# Patient Record
Sex: MALE | Race: OTHER | Employment: FULL TIME | URBAN - METROPOLITAN AREA
[De-identification: names, ages, dates, MRNs, and addresses within clinical notes are randomized per-mention and may not be internally consistent; named-entity substitution may affect disease eponyms.]

---

## 2017-01-10 RX ORDER — LISINOPRIL 10 MG/1
TABLET ORAL
Qty: 90 TABLET | Refills: 0 | Status: SHIPPED | OUTPATIENT
Start: 2017-01-10 | End: 2017-04-07 | Stop reason: SDUPTHER

## 2017-02-06 RX ORDER — GLIMEPIRIDE 4 MG/1
TABLET ORAL
Qty: 60 TABLET | Refills: 2 | Status: SHIPPED | OUTPATIENT
Start: 2017-02-06 | End: 2017-05-04 | Stop reason: SDUPTHER

## 2017-04-07 RX ORDER — SITAGLIPTIN 100 MG/1
TABLET, FILM COATED ORAL
Qty: 90 TABLET | Refills: 0 | Status: SHIPPED | OUTPATIENT
Start: 2017-04-07 | End: 2017-07-10 | Stop reason: SDUPTHER

## 2017-04-07 RX ORDER — LISINOPRIL 10 MG/1
TABLET ORAL
Qty: 90 TABLET | Refills: 0 | Status: SHIPPED | OUTPATIENT
Start: 2017-04-07 | End: 2017-07-10 | Stop reason: SDUPTHER

## 2017-05-15 RX ORDER — BLOOD-GLUCOSE METER
KIT MISCELLANEOUS
Qty: 100 STRIP | Refills: 1 | Status: SHIPPED | OUTPATIENT
Start: 2017-05-15 | End: 2022-05-24 | Stop reason: ALTCHOICE

## 2017-07-10 RX ORDER — PRAVASTATIN SODIUM 10 MG
TABLET ORAL
Qty: 90 TABLET | Refills: 0 | Status: SHIPPED | OUTPATIENT
Start: 2017-07-10 | End: 2018-01-25 | Stop reason: SDUPTHER

## 2017-07-10 RX ORDER — LISINOPRIL 10 MG/1
TABLET ORAL
Qty: 90 TABLET | Refills: 0 | Status: SHIPPED | OUTPATIENT
Start: 2017-07-10 | End: 2018-01-25 | Stop reason: SDUPTHER

## 2017-12-07 RX ORDER — SILDENAFIL CITRATE 20 MG/1
20 TABLET ORAL PRN
Qty: 27 TABLET | Refills: 3 | Status: SHIPPED | OUTPATIENT
Start: 2017-12-07 | End: 2019-09-17

## 2018-01-25 ENCOUNTER — OFFICE VISIT (OUTPATIENT)
Dept: INTERNAL MEDICINE CLINIC | Age: 48
End: 2018-01-25
Payer: COMMERCIAL

## 2018-01-25 VITALS
HEART RATE: 79 BPM | WEIGHT: 249 LBS | DIASTOLIC BLOOD PRESSURE: 76 MMHG | HEIGHT: 74 IN | TEMPERATURE: 97.6 F | OXYGEN SATURATION: 97 % | BODY MASS INDEX: 31.95 KG/M2 | SYSTOLIC BLOOD PRESSURE: 120 MMHG

## 2018-01-25 DIAGNOSIS — L30.9 VESICULAR FOOT ECZEMA: ICD-10-CM

## 2018-01-25 DIAGNOSIS — E11.8 CONTROLLED TYPE 2 DIABETES MELLITUS WITH COMPLICATION, WITHOUT LONG-TERM CURRENT USE OF INSULIN (HCC): Primary | Chronic | ICD-10-CM

## 2018-01-25 LAB
GLUCOSE BLD-MCNC: 140 MG/DL
HBA1C MFR BLD: 5.3 %

## 2018-01-25 PROCEDURE — 83036 HEMOGLOBIN GLYCOSYLATED A1C: CPT | Performed by: INTERNAL MEDICINE

## 2018-01-25 PROCEDURE — 99213 OFFICE O/P EST LOW 20 MIN: CPT | Performed by: INTERNAL MEDICINE

## 2018-01-25 PROCEDURE — 82962 GLUCOSE BLOOD TEST: CPT | Performed by: INTERNAL MEDICINE

## 2018-01-25 RX ORDER — GLIMEPIRIDE 4 MG/1
4 TABLET ORAL
Qty: 90 TABLET | Refills: 1 | Status: SHIPPED | OUTPATIENT
Start: 2018-01-25 | End: 2018-07-09 | Stop reason: SDUPTHER

## 2018-01-25 RX ORDER — LISINOPRIL 10 MG/1
TABLET ORAL
Qty: 90 TABLET | Refills: 1 | Status: SHIPPED | OUTPATIENT
Start: 2018-01-25 | End: 2018-07-18 | Stop reason: SDUPTHER

## 2018-01-25 RX ORDER — PRAVASTATIN SODIUM 10 MG
TABLET ORAL
Qty: 90 TABLET | Refills: 1 | Status: SHIPPED | OUTPATIENT
Start: 2018-01-25 | End: 2018-07-18 | Stop reason: SDUPTHER

## 2018-01-25 RX ORDER — GLIMEPIRIDE 4 MG/1
TABLET ORAL
Qty: 180 TABLET | Refills: 1 | Status: CANCELLED | OUTPATIENT
Start: 2018-01-25

## 2018-01-25 ASSESSMENT — ENCOUNTER SYMPTOMS
EYES NEGATIVE: 1
SHORTNESS OF BREATH: 0
VISUAL CHANGE: 0
NAIL CHANGES: 0
DIARRHEA: 0
GASTROINTESTINAL NEGATIVE: 1
EYE PAIN: 0
CHEST TIGHTNESS: 0
BLOOD IN STOOL: 0
BLURRED VISION: 0
ABDOMINAL PAIN: 0
COUGH: 0

## 2018-01-25 ASSESSMENT — PATIENT HEALTH QUESTIONNAIRE - PHQ9
1. LITTLE INTEREST OR PLEASURE IN DOING THINGS: 0
2. FEELING DOWN, DEPRESSED OR HOPELESS: 0
SUM OF ALL RESPONSES TO PHQ QUESTIONS 1-9: 0
SUM OF ALL RESPONSES TO PHQ9 QUESTIONS 1 & 2: 0

## 2018-01-25 NOTE — PROGRESS NOTES
Lianne Golden is a 52 y.o. male with history of obesity, dyslipidemia, who presents with     Chief Complaint   Patient presents with    Diabetes     fasting home , A1C 5.3, the patient has lost weight    Rash     of the right foot     Medication Refill   The patient has not been seen in the office for over one year. Fortunately, he has lost significant amount of weight and improved his diabetes control. He continued all his medications and noticed blood sugar being 90 or below some of the times. States he is on the diet which includes mostly vegetables and meats. He is trying to eat healthy. He does not exercise much and continues to work in the moderate to high stress level job. The following laboratory reports since the past visit were reviewed (the ones pertinent to today's visit were discussed with the patient):    Office Visit on 01/25/2018   Component Date Value Ref Range Status    Hemoglobin A1C 01/25/2018 5.3  % Final    Glucose 01/25/2018 140  mg/dL Final       HPI:    Diabetes   He presents for his follow-up diabetic visit. He has type 2 diabetes mellitus. His disease course has been improving. Pertinent negatives for hypoglycemia include no confusion, dizziness, headaches, nervousness/anxiousness or tremors. Pertinent negatives for diabetes include no blurred vision, no chest pain, no foot paresthesias, no foot ulcerations, no polydipsia, no polyuria, no visual change and no weight loss. There are no hypoglycemic complications. Diabetic complications include nephropathy and retinopathy. Pertinent negatives for diabetic complications include no CVA, heart disease, peripheral neuropathy or PVD. Risk factors for coronary artery disease include male sex, obesity, stress and diabetes mellitus. Current diabetic treatment includes oral agent (triple therapy). He is compliant with treatment all of the time. His weight is decreasing steadily. He is following a generally healthy diet.  Meal Negative for cold intolerance, heat intolerance, polydipsia and polyuria. Genitourinary: Negative for dysuria, frequency and hematuria. Musculoskeletal: Negative. Negative for joint pain and myalgias. Skin: Positive for rash. Negative for nail changes. Neurological: Negative. Negative for dizziness, tremors, light-headedness, numbness and headaches. Psychiatric/Behavioral: Negative for confusion, decreased concentration, dysphoric mood and sleep disturbance. The patient is not nervous/anxious. Vitals:    01/25/18 1218   BP: 120/76   Site: Right Arm   Position: Sitting   Cuff Size: Medium Adult   Pulse: 79   Temp: 97.6 °F (36.4 °C)   TempSrc: Tympanic   SpO2: 97%   Weight: 249 lb (112.9 kg)   Height: 6' 2\" (1.88 m)       Physical Exam   Constitutional: He is oriented to person, place, and time. He appears well-nourished. No distress. HENT:   Head: Atraumatic. Mouth/Throat: Oropharynx is clear and moist.   Eyes: Conjunctivae are normal. No scleral icterus. Neck: Neck supple. No thyromegaly present. Cardiovascular: Regular rhythm, normal heart sounds and intact distal pulses. No murmur heard. Pulmonary/Chest: Effort normal and breath sounds normal.   Abdominal: Soft. He exhibits no distension. There is no tenderness. Musculoskeletal: Normal range of motion. Foot exam reveals incipient right great toe bunion, good dorsalis pedis and tibialis posterior pulses, no onychomycosis or interdigital dermatitis. The light touch sensation, filament test and the position sense are preserved in both feet  There is a 3 x 2 cm irregularly shaped erythematous patch with a rough surface, coarse scaling, on the dorsum of the right foot, by the MTP joint of the toes 3 and 4, appearance of eczema     Lymphadenopathy:     He has no cervical adenopathy. Neurological: He is alert and oriented to person, place, and time. No cranial nerve deficit. Coordination normal.   Skin: Skin is warm and dry. Placed This Encounter   Procedures    CBC     Standing Status:   Future     Standing Expiration Date:   1/25/2019    Comprehensive Metabolic Panel     Standing Status:   Future     Standing Expiration Date:   1/25/2019    Lipid Panel     Standing Status:   Future     Standing Expiration Date:   1/25/2019     Order Specific Question:   Is Patient Fasting?/# of Hours     Answer:   Yes, 12 Hours    Urinalysis     Standing Status:   Future     Standing Expiration Date:   1/25/2019     Order Specific Question:   SPECIFY(EX-CATH,MIDSTREAM,CYSTO,ETC)? Answer:   Midstream    Vitamin B12 & Folate     Standing Status:   Future     Standing Expiration Date:   1/25/2019    Microalbumin / Creatinine Urine Ratio     Standing Status:   Future     Standing Expiration Date:   1/25/2019    Referral To Podiatry - (DINORAH) Sohail Donato DPM     Referral Priority:   Routine     Referral Type:   Consult for Advice and Opinion     Referral Reason:   Specialty Services Required     Referred to Provider:   Sohail Donato DPM     Requested Specialty:   Podiatry     Number of Visits Requested:   1    POCT glycosylated hemoglobin (Hb A1C)    POCT Glucose    HM DIABETES FOOT EXAM     Further workup and plan will be determined based on clinical progression and follow up test/ treatment results. Close follow up needed to evaluate treatment results and for care coordination. Return in about 4 months (around 5/25/2018). I have reviewed the patient's medical and surgical, family and social history, health maintenance schedule, and updated the computerized patient record. Please note this report has been partially produced by using speech recognition hardware. It may contain errors related to the system, including grammar, punctuation and spelling as well as words and phrases that may seem inaccurate. For any questions or concerns, please feel free to contact me for clarification.         Electronically signed by Ksenia Parsons Hector MORGAN

## 2018-02-01 NOTE — TELEPHONE ENCOUNTER
Pharmacy is requesting an increase in the number of tablets. New Rx is pended for your review.  Please advise

## 2018-02-02 RX ORDER — SILDENAFIL CITRATE 20 MG/1
20 TABLET ORAL PRN
Qty: 90 TABLET | Refills: 3 | Status: SHIPPED | OUTPATIENT
Start: 2018-02-02 | End: 2019-09-17

## 2018-07-09 RX ORDER — GLIMEPIRIDE 4 MG/1
TABLET ORAL
Qty: 90 TABLET | Refills: 0 | Status: SHIPPED | OUTPATIENT
Start: 2018-07-09 | End: 2018-08-25 | Stop reason: SDUPTHER

## 2018-07-18 ENCOUNTER — OFFICE VISIT (OUTPATIENT)
Dept: INTERNAL MEDICINE CLINIC | Age: 48
End: 2018-07-18
Payer: COMMERCIAL

## 2018-07-18 VITALS
HEART RATE: 74 BPM | HEIGHT: 74 IN | SYSTOLIC BLOOD PRESSURE: 128 MMHG | OXYGEN SATURATION: 99 % | DIASTOLIC BLOOD PRESSURE: 80 MMHG | BODY MASS INDEX: 32.98 KG/M2 | TEMPERATURE: 97.4 F | WEIGHT: 257 LBS

## 2018-07-18 DIAGNOSIS — I10 ESSENTIAL HYPERTENSION: Chronic | ICD-10-CM

## 2018-07-18 DIAGNOSIS — E11.8 CONTROLLED TYPE 2 DIABETES MELLITUS WITH COMPLICATION, WITHOUT LONG-TERM CURRENT USE OF INSULIN (HCC): Chronic | ICD-10-CM

## 2018-07-18 DIAGNOSIS — E11.8 TYPE 2 DIABETES MELLITUS WITH COMPLICATION, WITHOUT LONG-TERM CURRENT USE OF INSULIN (HCC): Primary | Chronic | ICD-10-CM

## 2018-07-18 LAB
ALBUMIN SERPL-MCNC: 4.7 G/DL (ref 3.9–4.9)
ALP BLD-CCNC: 86 U/L (ref 35–104)
ALT SERPL-CCNC: 41 U/L (ref 0–41)
ANION GAP SERPL CALCULATED.3IONS-SCNC: 11 MEQ/L (ref 7–13)
AST SERPL-CCNC: 23 U/L (ref 0–40)
BILIRUB SERPL-MCNC: 0.5 MG/DL (ref 0–1.2)
BILIRUBIN URINE: NEGATIVE
BLOOD, URINE: NEGATIVE
BUN BLDV-MCNC: 13 MG/DL (ref 6–20)
CALCIUM SERPL-MCNC: 9.1 MG/DL (ref 8.6–10.2)
CHLORIDE BLD-SCNC: 102 MEQ/L (ref 98–107)
CHOLESTEROL, TOTAL: 89 MG/DL (ref 0–199)
CLARITY: CLEAR
CO2: 24 MEQ/L (ref 22–29)
COLOR: YELLOW
CREAT SERPL-MCNC: 0.61 MG/DL (ref 0.7–1.2)
CREATININE URINE: 232.8 MG/DL
FOLATE: 7.1 NG/ML (ref 7.3–26.1)
GFR AFRICAN AMERICAN: >60
GFR NON-AFRICAN AMERICAN: >60
GLOBULIN: 2.9 G/DL (ref 2.3–3.5)
GLUCOSE BLD-MCNC: 145 MG/DL (ref 74–109)
GLUCOSE BLD-MCNC: 167 MG/DL
GLUCOSE URINE: 250 MG/DL
HBA1C MFR BLD: 6.4 %
HCT VFR BLD CALC: 48.3 % (ref 42–52)
HDLC SERPL-MCNC: 33 MG/DL (ref 40–59)
HEMOGLOBIN: 16.7 G/DL (ref 14–18)
KETONES, URINE: NEGATIVE MG/DL
LDL CHOLESTEROL CALCULATED: 43 MG/DL (ref 0–129)
LEUKOCYTE ESTERASE, URINE: NEGATIVE
MCH RBC QN AUTO: 30.2 PG (ref 27–31.3)
MCHC RBC AUTO-ENTMCNC: 34.5 % (ref 33–37)
MCV RBC AUTO: 87.6 FL (ref 80–100)
MICROALBUMIN UR-MCNC: 4.8 MG/DL
MICROALBUMIN/CREAT UR-RTO: 20.6 MG/G (ref 0–30)
NITRITE, URINE: NEGATIVE
PDW BLD-RTO: 14 % (ref 11.5–14.5)
PH UA: 5 (ref 5–9)
PLATELET # BLD: 178 K/UL (ref 130–400)
POTASSIUM SERPL-SCNC: 3.9 MEQ/L (ref 3.5–5.1)
PROTEIN UA: NEGATIVE MG/DL
RBC # BLD: 5.52 M/UL (ref 4.7–6.1)
SODIUM BLD-SCNC: 137 MEQ/L (ref 132–144)
SPECIFIC GRAVITY UA: 1.03 (ref 1–1.03)
TOTAL PROTEIN: 7.6 G/DL (ref 6.4–8.1)
TRIGL SERPL-MCNC: 65 MG/DL (ref 0–200)
UROBILINOGEN, URINE: 0.2 E.U./DL
VITAMIN B-12: 553 PG/ML (ref 232–1245)
WBC # BLD: 7.8 K/UL (ref 4.8–10.8)

## 2018-07-18 PROCEDURE — 99213 OFFICE O/P EST LOW 20 MIN: CPT | Performed by: INTERNAL MEDICINE

## 2018-07-18 PROCEDURE — 82962 GLUCOSE BLOOD TEST: CPT | Performed by: INTERNAL MEDICINE

## 2018-07-18 PROCEDURE — 83036 HEMOGLOBIN GLYCOSYLATED A1C: CPT | Performed by: INTERNAL MEDICINE

## 2018-07-18 RX ORDER — LISINOPRIL 10 MG/1
TABLET ORAL
Qty: 45 TABLET | Refills: 1 | Status: SHIPPED | OUTPATIENT
Start: 2018-07-18 | End: 2018-10-17 | Stop reason: SDUPTHER

## 2018-07-18 RX ORDER — PRAVASTATIN SODIUM 10 MG
10 TABLET ORAL EVERY OTHER DAY
Qty: 45 TABLET | Refills: 1
Start: 2018-07-18 | End: 2019-04-03 | Stop reason: SDUPTHER

## 2018-07-18 RX ORDER — FOLIC ACID 1 MG/1
1 TABLET ORAL DAILY
Qty: 30 TABLET | Refills: 3 | Status: SHIPPED | OUTPATIENT
Start: 2018-07-18 | End: 2018-11-23 | Stop reason: SDUPTHER

## 2018-07-18 RX ORDER — LISINOPRIL 10 MG/1
TABLET ORAL
Qty: 90 TABLET | Refills: 1 | Status: SHIPPED | OUTPATIENT
Start: 2018-07-18 | End: 2018-07-18 | Stop reason: SDUPTHER

## 2018-07-18 ASSESSMENT — ENCOUNTER SYMPTOMS
BLURRED VISION: 0
DIARRHEA: 0
COUGH: 0
CHEST TIGHTNESS: 0
BLOOD IN STOOL: 0
VISUAL CHANGE: 0
SHORTNESS OF BREATH: 0
EYE PAIN: 0
ABDOMINAL PAIN: 0

## 2018-07-18 NOTE — PROGRESS NOTES
Problem Relation Age of Onset    Diabetes Father     Hypertension Mother        Family and social history were reviewed and pertinent changes documented. ALLERGIES    Patient has no known allergies. Current Outpatient Prescriptions on File Prior to Visit   Medication Sig Dispense Refill    metFORMIN (GLUCOPHAGE) 500 MG tablet TAKE TWO TABLETS BY MOUTH TWICE A DAY WITH MEALS 120 tablet 0    glimepiride (AMARYL) 4 MG tablet TAKE ONE TABLET BY MOUTH IN THE MORNING BEFORE BREAKFAST 90 tablet 0    sildenafil (REVATIO) 20 MG tablet Take 1 tablet by mouth as needed (PRN) (up to 5 tablets max. of 5 in 1 day) take on an empty stomach 2 hrs before planned sexual activity. 90 tablet 3    diclofenac sodium 1 % GEL Apply 2 g topically 2 times daily 1 Tube 3    sildenafil (REVATIO) 20 MG tablet Take 1 tablet by mouth as needed (PRN) (up to 5 tablets max. of 5 in 1 day) take on an empty stomach 2 hrs before planned sexual activity. 27 tablet 3    FREESTYLE LITE strip TEST TWICE A DAY AS DIRECTED 100 strip 1    LANCETS ULTRA THIN MISC 1 each by Does not apply route 2 times daily Dx: 250.00, non-insulin dependent 100 each 3    Blood Glucose Monitoring Suppl (FREESTYLE LITE) ABIGAIL 1 Device by Does not apply route daily Dx: 250.00, non-insulin dependent 1 Device prn    mometasone (ELOCON) 0.1 % cream Apply topically twice daily to the skin lesion 15 g 1    betamethasone dipropionate (DIPROLENE) 0.05 % ointment Apply topically 2 times daily. 30 g 0    vitamin D (CHOLECALCIFEROL) 400 UNITS TABS tablet Take 400 Units by mouth daily.  Multiple Vitamin (MULTI-VITAMIN) TABS Take 1 tablet by mouth daily. No current facility-administered medications on file prior to visit. Review of Systems   Constitutional: Negative for activity change, appetite change, fatigue, unexpected weight change and weight loss. HENT: Negative for nosebleeds.     Eyes: Negative for blurred vision, pain and visual disturbance. Respiratory: Negative for cough, chest tightness and shortness of breath. Cardiovascular: Negative for chest pain, palpitations and leg swelling. Gastrointestinal: Negative for abdominal pain, blood in stool and diarrhea. Endocrine: Negative for cold intolerance, heat intolerance, polydipsia and polyuria. Genitourinary: Negative for dysuria, frequency and hematuria. Musculoskeletal: Negative for myalgias. Neurological: Negative for dizziness, tremors, light-headedness, numbness and headaches. Psychiatric/Behavioral: Negative for confusion, decreased concentration, dysphoric mood and sleep disturbance. The patient is not nervous/anxious. Vitals:    07/18/18 1108 07/18/18 1113 07/18/18 1144   BP: (!) 148/90 (!) 150/90 128/80   Site: Left Arm Left Arm    Position: Sitting Sitting    Cuff Size: Large Adult Large Adult    Pulse: 74     Temp: 97.4 °F (36.3 °C)     TempSrc: Tympanic     SpO2: 99%     Weight: 257 lb (116.6 kg)     Height: 6' 2\" (1.88 m)         Physical Exam   Constitutional: He is oriented to person, place, and time. He appears well-nourished. No distress. HENT:   Head: Atraumatic. Mouth/Throat: Oropharynx is clear and moist.   Eyes: Conjunctivae are normal. No scleral icterus. Neck: Neck supple. No JVD present. No thyromegaly present. Cardiovascular: Regular rhythm, normal heart sounds and intact distal pulses. No murmur heard. Pulmonary/Chest: Effort normal and breath sounds normal.   Abdominal: Soft. He exhibits no distension. There is no tenderness. Musculoskeletal: Normal range of motion. Lymphadenopathy:     He has no cervical adenopathy. Neurological: He is alert and oriented to person, place, and time. No cranial nerve deficit. Coordination normal.   Skin: Skin is warm and dry. Psychiatric: His behavior is normal. Judgment normal. His mood appears anxious. His speech is rapid and/or pressured. He is not slowed and not withdrawn.  Cognition and

## 2018-07-18 NOTE — PATIENT INSTRUCTIONS
please click on the \"Sign Up Now\" link. Current as of: May 12, 2017  Content Version: 11.6  © 2246-5555 [employer]. Care instructions adapted under license by [facility] ([facility]). If you have questions about a medical condition or this instruction, always ask your healthcare professional. [address] any warranty or liability for your use of this information.

## 2018-10-17 RX ORDER — LISINOPRIL 10 MG/1
TABLET ORAL
Qty: 45 TABLET | Refills: 1 | Status: SHIPPED | OUTPATIENT
Start: 2018-10-17 | End: 2019-02-18 | Stop reason: SDUPTHER

## 2019-02-19 RX ORDER — LISINOPRIL 10 MG/1
TABLET ORAL
Qty: 45 TABLET | Refills: 1 | Status: SHIPPED | OUTPATIENT
Start: 2019-02-19 | End: 2019-06-07

## 2019-03-01 ENCOUNTER — OFFICE VISIT (OUTPATIENT)
Dept: UROLOGY | Age: 49
End: 2019-03-01
Payer: COMMERCIAL

## 2019-03-01 VITALS
DIASTOLIC BLOOD PRESSURE: 74 MMHG | SYSTOLIC BLOOD PRESSURE: 118 MMHG | HEIGHT: 74 IN | BODY MASS INDEX: 31.95 KG/M2 | WEIGHT: 249 LBS | HEART RATE: 66 BPM

## 2019-03-01 DIAGNOSIS — N40.0 BENIGN PROSTATIC HYPERPLASIA WITHOUT LOWER URINARY TRACT SYMPTOMS: Primary | ICD-10-CM

## 2019-03-01 PROCEDURE — 99213 OFFICE O/P EST LOW 20 MIN: CPT | Performed by: UROLOGY

## 2019-03-01 RX ORDER — SILDENAFIL 100 MG/1
100 TABLET, FILM COATED ORAL DAILY PRN
Qty: 30 TABLET | Refills: 11 | Status: SHIPPED | OUTPATIENT
Start: 2019-03-01 | End: 2019-09-17

## 2019-04-03 RX ORDER — GLIMEPIRIDE 4 MG/1
TABLET ORAL
Qty: 90 TABLET | Refills: 0 | Status: SHIPPED | OUTPATIENT
Start: 2019-04-03 | End: 2019-07-11 | Stop reason: SDUPTHER

## 2019-04-03 RX ORDER — PRAVASTATIN SODIUM 10 MG
TABLET ORAL
Qty: 30 TABLET | Refills: 3 | Status: SHIPPED | OUTPATIENT
Start: 2019-04-03 | End: 2019-09-19 | Stop reason: SDUPTHER

## 2019-07-12 RX ORDER — GLIMEPIRIDE 4 MG/1
TABLET ORAL
Qty: 90 TABLET | Refills: 0 | Status: SHIPPED | OUTPATIENT
Start: 2019-07-12 | End: 2019-09-17 | Stop reason: SDUPTHER

## 2019-09-09 ENCOUNTER — OFFICE VISIT (OUTPATIENT)
Dept: UROLOGY | Age: 49
End: 2019-09-09
Payer: COMMERCIAL

## 2019-09-09 VITALS
SYSTOLIC BLOOD PRESSURE: 136 MMHG | DIASTOLIC BLOOD PRESSURE: 84 MMHG | HEIGHT: 74 IN | BODY MASS INDEX: 34.01 KG/M2 | WEIGHT: 265 LBS | HEART RATE: 84 BPM

## 2019-09-09 DIAGNOSIS — N49.2 SCROTAL ABSCESS: Primary | ICD-10-CM

## 2019-09-09 PROCEDURE — 99214 OFFICE O/P EST MOD 30 MIN: CPT | Performed by: UROLOGY

## 2019-09-09 RX ORDER — CEPHALEXIN 500 MG/1
500 CAPSULE ORAL EVERY 12 HOURS
Qty: 20 CAPSULE | Refills: 0 | Status: SHIPPED | OUTPATIENT
Start: 2019-09-09 | End: 2020-03-13 | Stop reason: ALTCHOICE

## 2019-09-09 RX ORDER — TADALAFIL 20 MG/1
20 TABLET ORAL DAILY PRN
Qty: 30 TABLET | Refills: 11 | Status: SHIPPED | OUTPATIENT
Start: 2019-09-09 | End: 2020-10-12

## 2019-09-12 ENCOUNTER — OFFICE VISIT (OUTPATIENT)
Dept: UROLOGY | Age: 49
End: 2019-09-12
Payer: COMMERCIAL

## 2019-09-12 VITALS
HEIGHT: 74 IN | SYSTOLIC BLOOD PRESSURE: 126 MMHG | DIASTOLIC BLOOD PRESSURE: 70 MMHG | HEART RATE: 83 BPM | WEIGHT: 265 LBS | BODY MASS INDEX: 34.01 KG/M2

## 2019-09-12 DIAGNOSIS — N49.2 SCROTAL ABSCESS: Primary | ICD-10-CM

## 2019-09-12 PROCEDURE — 99212 OFFICE O/P EST SF 10 MIN: CPT | Performed by: UROLOGY

## 2019-09-17 ENCOUNTER — OFFICE VISIT (OUTPATIENT)
Dept: INTERNAL MEDICINE CLINIC | Age: 49
End: 2019-09-17
Payer: COMMERCIAL

## 2019-09-17 VITALS
SYSTOLIC BLOOD PRESSURE: 121 MMHG | BODY MASS INDEX: 34.28 KG/M2 | WEIGHT: 267 LBS | DIASTOLIC BLOOD PRESSURE: 73 MMHG | HEART RATE: 75 BPM | OXYGEN SATURATION: 97 %

## 2019-09-17 DIAGNOSIS — R20.2 PARESTHESIA OF LOWER EXTREMITY: ICD-10-CM

## 2019-09-17 LAB
ALBUMIN SERPL-MCNC: 4.3 G/DL (ref 3.5–4.6)
ALP BLD-CCNC: 91 U/L (ref 35–104)
ALT SERPL-CCNC: 42 U/L (ref 0–41)
ANION GAP SERPL CALCULATED.3IONS-SCNC: 9 MEQ/L (ref 9–15)
AST SERPL-CCNC: 27 U/L (ref 0–40)
BILIRUB SERPL-MCNC: 0.3 MG/DL (ref 0.2–0.7)
BILIRUBIN URINE: NEGATIVE
BLOOD, URINE: NEGATIVE
BUN BLDV-MCNC: 16 MG/DL (ref 6–20)
CALCIUM SERPL-MCNC: 9.1 MG/DL (ref 8.5–9.9)
CHLORIDE BLD-SCNC: 101 MEQ/L (ref 95–107)
CLARITY: CLEAR
CO2: 27 MEQ/L (ref 20–31)
COLOR: YELLOW
CREAT SERPL-MCNC: 0.67 MG/DL (ref 0.7–1.2)
CREATININE URINE: 113.5 MG/DL
FOLATE: 18.3 NG/ML (ref 7.3–26.1)
GFR AFRICAN AMERICAN: >60
GFR NON-AFRICAN AMERICAN: >60
GLOBULIN: 2.8 G/DL (ref 2.3–3.5)
GLUCOSE BLD-MCNC: 227 MG/DL (ref 70–99)
GLUCOSE URINE: 500 MG/DL
HBA1C MFR BLD: 8 %
HCT VFR BLD CALC: 42.3 % (ref 42–52)
HEMOGLOBIN: 14.8 G/DL (ref 14–18)
KETONES, URINE: NEGATIVE MG/DL
LEUKOCYTE ESTERASE, URINE: NEGATIVE
MCH RBC QN AUTO: 30.5 PG (ref 27–31.3)
MCHC RBC AUTO-ENTMCNC: 35.1 % (ref 33–37)
MCV RBC AUTO: 87.1 FL (ref 80–100)
MICROALBUMIN UR-MCNC: 1.5 MG/DL
MICROALBUMIN/CREAT UR-RTO: 13.2 MG/G (ref 0–30)
NITRITE, URINE: NEGATIVE
PDW BLD-RTO: 13.5 % (ref 11.5–14.5)
PH UA: 5 (ref 5–9)
PLATELET # BLD: 168 K/UL (ref 130–400)
POTASSIUM SERPL-SCNC: 4.9 MEQ/L (ref 3.4–4.9)
PROTEIN UA: NEGATIVE MG/DL
RBC # BLD: 4.86 M/UL (ref 4.7–6.1)
SODIUM BLD-SCNC: 137 MEQ/L (ref 135–144)
SPECIFIC GRAVITY UA: 1.02 (ref 1–1.03)
TOTAL PROTEIN: 7.1 G/DL (ref 6.3–8)
TSH SERPL DL<=0.05 MIU/L-ACNC: 1.15 UIU/ML (ref 0.44–3.86)
UROBILINOGEN, URINE: 0.2 E.U./DL
VITAMIN B-12: 455 PG/ML (ref 232–1245)
WBC # BLD: 6.7 K/UL (ref 4.8–10.8)

## 2019-09-17 PROCEDURE — 83036 HEMOGLOBIN GLYCOSYLATED A1C: CPT | Performed by: INTERNAL MEDICINE

## 2019-09-17 PROCEDURE — 99214 OFFICE O/P EST MOD 30 MIN: CPT | Performed by: INTERNAL MEDICINE

## 2019-09-17 RX ORDER — GLIMEPIRIDE 4 MG/1
6 TABLET ORAL
Qty: 135 TABLET | Refills: 0 | Status: SHIPPED | OUTPATIENT
Start: 2019-09-17 | End: 2020-04-08

## 2019-09-17 RX ORDER — FOLIC ACID 1 MG/1
1 TABLET ORAL DAILY
Qty: 90 TABLET | Refills: 0 | Status: SHIPPED | OUTPATIENT
Start: 2019-09-17 | End: 2020-01-20

## 2019-09-17 ASSESSMENT — ENCOUNTER SYMPTOMS
TROUBLE SWALLOWING: 0
SHORTNESS OF BREATH: 0
BLOOD IN STOOL: 0
ABDOMINAL PAIN: 0
EYE PAIN: 0
COUGH: 0
VISUAL CHANGE: 0
DIARRHEA: 0
CHEST TIGHTNESS: 0
BACK PAIN: 0

## 2019-09-17 NOTE — PROGRESS NOTES
pertinent changes documented. ALLERGIES    Patient has no known allergies. Current Outpatient Medications on File Prior to Visit   Medication Sig Dispense Refill    cephALEXin (KEFLEX) 500 MG capsule Take 1 capsule by mouth every 12 hours 20 capsule 0    tadalafil (CIALIS) 20 MG tablet Take 1 tablet by mouth daily as needed for Erectile Dysfunction 30 tablet 11    lisinopril (PRINIVIL;ZESTRIL) 10 MG tablet TAKE ONE-HALF TABLET BY MOUTH EVERY DAY 45 tablet 5    metFORMIN (GLUCOPHAGE) 500 MG tablet TAKE TWO TABLETS BY MOUTH TWICE A DAY WITH MEALS 120 tablet 5    pravastatin (PRAVACHOL) 10 MG tablet TAKE ONE TABLET BY MOUTH EVERY DAY IN THE EVENING 30 tablet 3    FREESTYLE LITE strip TEST TWICE A DAY AS DIRECTED 100 strip 1    LANCETS ULTRA THIN MISC 1 each by Does not apply route 2 times daily Dx: 250.00, non-insulin dependent 100 each 3    Blood Glucose Monitoring Suppl (FREESTYLE LITE) ABIGAIL 1 Device by Does not apply route daily Dx: 250.00, non-insulin dependent 1 Device prn    Multiple Vitamin (MULTI-VITAMIN) TABS Take 1 tablet by mouth daily. No current facility-administered medications on file prior to visit. Review of Systems   Constitutional: Negative for activity change, appetite change, fatigue, unexpected weight change and weight loss. HENT: Negative for nosebleeds and trouble swallowing. Eyes: Negative for pain and visual disturbance. Respiratory: Negative for cough, chest tightness and shortness of breath. Cardiovascular: Negative for chest pain, palpitations and leg swelling. Gastrointestinal: Negative for abdominal pain, blood in stool and diarrhea. Endocrine: Negative for cold intolerance, heat intolerance, polydipsia and polyuria. Genitourinary: Positive for impotence. Negative for dysuria and hematuria. Musculoskeletal: Negative for arthralgias, back pain, gait problem and myalgias. Skin: Positive for rash (on feet, comes and goes). Negative for wound. record. Please note this report has been partially produced by using speech recognition hardware. It may contain errors related to the system, including grammar, punctuation and spelling as well as words and phrases that may seem inaccurate. For anyquestions or concerns, please feel free to contact me for clarification.         Electronically signed by David Daniel MD

## 2019-09-19 NOTE — TELEPHONE ENCOUNTER
Pharmacy is requesting medication refill.  Please approve or deny this request.    Rx requested:  Requested Prescriptions     Pending Prescriptions Disp Refills    pravastatin (PRAVACHOL) 10 MG tablet [Pharmacy Med Name: PRAVASTATIN SODIUM 10MG TABS] 30 tablet 3     Sig: TAKE ONE TABLET BY MOUTH EVERY DAY IN THE EVENING         Last Office Visit:   9/17/2019      Next Visit Date:  Future Appointments   Date Time Provider Julissa Yarbrough   3/5/2020  9:00 AM Jacoby Hernández MD [facility]

## 2019-09-20 RX ORDER — PRAVASTATIN SODIUM 10 MG
TABLET ORAL
Qty: 30 TABLET | Refills: 3 | Status: SHIPPED | OUTPATIENT
Start: 2019-09-20 | End: 2020-02-25

## 2020-02-25 RX ORDER — PRAVASTATIN SODIUM 10 MG
TABLET ORAL
Qty: 30 TABLET | Refills: 3 | Status: SHIPPED | OUTPATIENT
Start: 2020-02-25 | End: 2020-07-02

## 2020-02-25 NOTE — TELEPHONE ENCOUNTER
Pharmacy requesting medication refill.  Please approve or deny this request.    Rx requested:  Requested Prescriptions     Pending Prescriptions Disp Refills    pravastatin (PRAVACHOL) 10 MG tablet [Pharmacy Med Name: PRAVASTATIN SODIUM 10MG TABS] 30 tablet 3     Sig: TAKE ONE TABLET BY MOUTH EVERY DAY IN THE EVENING         Last Office Visit:   9/17/2019      Next Visit Date:  Future Appointments   Date Time Provider Julissa Yarbrough   3/5/2020  9:00 AM Brittaney Vale MD [facility]

## 2020-03-12 DIAGNOSIS — N40.0 BENIGN PROSTATIC HYPERPLASIA WITHOUT LOWER URINARY TRACT SYMPTOMS: ICD-10-CM

## 2020-03-12 LAB — PROSTATE SPECIFIC ANTIGEN: 0.53 NG/ML (ref 0–3.89)

## 2020-03-13 ENCOUNTER — OFFICE VISIT (OUTPATIENT)
Dept: UROLOGY | Age: 50
End: 2020-03-13
Payer: COMMERCIAL

## 2020-03-13 VITALS
WEIGHT: 260 LBS | HEIGHT: 74 IN | HEART RATE: 94 BPM | BODY MASS INDEX: 33.37 KG/M2 | DIASTOLIC BLOOD PRESSURE: 60 MMHG | SYSTOLIC BLOOD PRESSURE: 112 MMHG

## 2020-03-13 PROCEDURE — 99213 OFFICE O/P EST LOW 20 MIN: CPT | Performed by: UROLOGY

## 2020-03-13 NOTE — PROGRESS NOTES
[facility] EVALUATION NOTE                                                 H&P                                                                                                                                                 Reason for Visit  Erectile dysfunction, BPH without obstruction    History of Present Illness  44-year-old male who had a spontaneous rupture of the scrotal abscess last year with complete resolution of issue  Patient is a diabetic no recent hemoglobin A1c recorded  Recent PSA is 0.53  Patient continues to take tadalafil for erectile dysfunction which works well for him  Denies obstructive voiding symptoms      Urologic Review of Systems/Symptoms  Denies hematuria  Denies dysuria  Denies incontinence  Denies flank pain  Other Urologic: No issues with urination improved erections with tadalafil    Review of Systems  Head and neck: No issues/reviewed  Cardiac: No recent issues/reviewed  Pulmonary: No issues/reviewed  Gastrointestinal: No issues/reviewed  Neurologic: No recent issues/reviewed  Extremities: No issues/reviewed  Lymphatics: No lymphadenopathy no change  Genitourinary: See above  Skin: No issues/reviewed  Hospitalization: None recent  Has a follow-up with his PCP we will check hemoglobin A1c at that time  All 14 categories of Review of Systems otherwise reviewed no other findings reported. Past Medical History:   Diagnosis Date    Bunion of great toe of right foot     Controlled diabetes mellitus type 2 with complications [facility]) 8473    Erectile dysfunction     Dr Reese Condon     Folic acid deficiency 7041    Low HDL (under 40)     Non-compliance with treatment 2015    Obesity (BMI 30-39. 9)      Past Surgical History:   Procedure Laterality Date    ABDOMEN SURGERY  2009    for diverticulitis    ABDOMINAL HERNIA REPAIR  2010     Social History     Socioeconomic History    Marital status:      Spouse name: None    Number of children: 5    Years of education: None    Highest education level: None   Occupational History    Occupation: ,      Employer: [employer]   Social Needs    Financial resource strain: None    Food insecurity     Worry: None     Inability: None    Transportation needs     Medical: None     Non-medical: None   Tobacco Use    Smoking status: Never Smoker    Smokeless tobacco: Never Used   Substance and Sexual Activity    Alcohol use:  Yes     Alcohol/week: 0.0 standard drinks    Drug use: No    Sexual activity: None   Lifestyle    Physical activity     Days per week: None     Minutes per session: None    Stress: None   Relationships    Social connections     Talks on phone: None     Gets together: None     Attends Spiritism service: None     Active member of club or organization: None     Attends meetings of clubs or organizations: None     Relationship status: None    Intimate partner violence     Fear of current or ex partner: None     Emotionally abused: None     Physically abused: None     Forced sexual activity: None   Other Topics Concern    None   Social History Narrative    Born in [place], father from [place], one sister and one brother    , spouse works in a [employer], office job, [employer] at [facility] 5, 3 daughters and 2 sons    Lives in a house in [place], with spouse and 4 children    Hobbies: Jehovah's witness, [employer] service, music (keyboard)             Family History   Problem Relation Age of Onset    Diabetes Father     Hypertension Mother      Current Outpatient Medications   Medication Sig Dispense Refill    pravastatin (PRAVACHOL) 10 MG tablet TAKE ONE TABLET BY MOUTH EVERY DAY IN THE EVENING 30 tablet 3    metFORMIN (GLUCOPHAGE) 500 MG tablet TAKE TWO TABLETS BY MOUTH TWICE A DAY WITH MEALS 051 tablet 3    folic acid (FOLVITE) 1 MG tablet TAKE ONE TABLET BY MOUTH EVERY DAY 90 tablet 0    glimepiride (AMARYL) 4 MG tablet Take 1.5 tablets by mouth every morning (before breakfast) 135 tablet 0    tadalafil (CIALIS) 20 MG tablet Take 1 tablet by mouth daily as needed for Erectile Dysfunction 30 tablet 11    lisinopril (PRINIVIL;ZESTRIL) 10 MG tablet TAKE ONE-HALF TABLET BY MOUTH EVERY DAY 45 tablet 5    FREESTYLE LITE strip TEST TWICE A DAY AS DIRECTED 100 strip 1    LANCETS ULTRA THIN MISC 1 each by Does not apply route 2 times daily Dx: 250.00, non-insulin dependent 100 each 3    Blood Glucose Monitoring Suppl (FREESTYLE LITE) ABIGAIL 1 Device by Does not apply route daily Dx: 250.00, non-insulin dependent 1 Device prn    Multiple Vitamin (MULTI-VITAMIN) TABS Take 1 tablet by mouth daily. No current facility-administered medications for this visit. Patient has no known allergies. All reviewed and verified by Dr Dexter Runner on today's visit    PSA   Date Value Ref Range Status   03/12/2020 0.53 0.00 - 3.89 ng/mL Final     No results found for this visit on 03/13/20. Physical Exam  Vitals:    03/13/20 1340   BP: 112/60   Pulse: 94   Weight: 260 lb (117.9 kg)   Height: 6' 2\" (1.88 m)     Constitutional: patient is oriented to person, place, and time. patient appears well-developed. Not in distress. Ears: Adequate hearing/no hearing loss  Head: Normocephalic. Atraumatic  Neck: Normal range of motion. Cardiovascular: Normal rate, BP reviewed. Normal rhythm  Pulmonary/Chest: Normal respiratory effort No wheezing  Abdominal: Not distended. No hernias  Urologic Exam  Uncircumcised penis no evidence of balanitis. Testis within normal limits scrotum within normal limits no evidence of abscess. Normal rectal tone, 20 g prostate with no nodules. Musculoskeletal: Normal range of motion. Ambulatory. Extremities: No edema  Neurological: Intact no deficits   Skin: Skin is warm and dry. No lesions. No rashes or ulcers   Psychiatric: Normal affect.   Assessment/Medical Necessity-Decision Making  Mild BPH with no obstruction normal

## 2020-03-14 DIAGNOSIS — E78.6 LOW HDL (UNDER 40): ICD-10-CM

## 2020-03-14 DIAGNOSIS — E11.8 TYPE 2 DIABETES MELLITUS WITH COMPLICATION, WITHOUT LONG-TERM CURRENT USE OF INSULIN (HCC): ICD-10-CM

## 2020-03-14 LAB
ALBUMIN SERPL-MCNC: 4.7 G/DL (ref 3.5–4.6)
ALP BLD-CCNC: 132 U/L (ref 35–104)
ALT SERPL-CCNC: 46 U/L (ref 0–41)
ANION GAP SERPL CALCULATED.3IONS-SCNC: 15 MEQ/L (ref 9–15)
AST SERPL-CCNC: 24 U/L (ref 0–40)
BILIRUB SERPL-MCNC: 0.4 MG/DL (ref 0.2–0.7)
BUN BLDV-MCNC: 12 MG/DL (ref 6–20)
CALCIUM SERPL-MCNC: 9.4 MG/DL (ref 8.5–9.9)
CHLORIDE BLD-SCNC: 105 MEQ/L (ref 95–107)
CHOLESTEROL, TOTAL: 75 MG/DL (ref 0–199)
CO2: 23 MEQ/L (ref 20–31)
CREAT SERPL-MCNC: 0.59 MG/DL (ref 0.7–1.2)
GFR AFRICAN AMERICAN: >60
GFR NON-AFRICAN AMERICAN: >60
GLOBULIN: 2.6 G/DL (ref 2.3–3.5)
GLUCOSE BLD-MCNC: 244 MG/DL (ref 70–99)
HBA1C MFR BLD: 8.3 % (ref 4.8–5.9)
HDLC SERPL-MCNC: 32 MG/DL (ref 40–59)
LDL CHOLESTEROL CALCULATED: 25 MG/DL (ref 0–129)
POTASSIUM SERPL-SCNC: 5 MEQ/L (ref 3.4–4.9)
SODIUM BLD-SCNC: 143 MEQ/L (ref 135–144)
TOTAL PROTEIN: 7.3 G/DL (ref 6.3–8)
TRIGL SERPL-MCNC: 89 MG/DL (ref 0–150)

## 2020-03-17 ENCOUNTER — OFFICE VISIT (OUTPATIENT)
Dept: ENDOCRINOLOGY | Age: 50
End: 2020-03-17
Payer: COMMERCIAL

## 2020-03-17 VITALS
BODY MASS INDEX: 34.52 KG/M2 | DIASTOLIC BLOOD PRESSURE: 78 MMHG | SYSTOLIC BLOOD PRESSURE: 142 MMHG | HEART RATE: 68 BPM | HEIGHT: 74 IN | WEIGHT: 269 LBS

## 2020-03-17 PROBLEM — K21.9 GASTROESOPHAGEAL REFLUX DISEASE WITHOUT ESOPHAGITIS: Status: ACTIVE | Noted: 2020-03-17

## 2020-03-17 LAB
CHP ED QC CHECK: NORMAL
GLUCOSE BLD-MCNC: 231 MG/DL

## 2020-03-17 PROCEDURE — 99214 OFFICE O/P EST MOD 30 MIN: CPT | Performed by: PHYSICIAN ASSISTANT

## 2020-03-17 PROCEDURE — 3052F HG A1C>EQUAL 8.0%<EQUAL 9.0%: CPT | Performed by: PHYSICIAN ASSISTANT

## 2020-03-17 PROCEDURE — 82962 GLUCOSE BLOOD TEST: CPT | Performed by: PHYSICIAN ASSISTANT

## 2020-03-17 RX ORDER — OMEPRAZOLE 20 MG/1
20 CAPSULE, DELAYED RELEASE ORAL DAILY
Qty: 30 CAPSULE | Refills: 3 | Status: SHIPPED | OUTPATIENT
Start: 2020-03-17 | End: 2020-04-14 | Stop reason: SDUPTHER

## 2020-03-17 RX ORDER — LANCETS 30 GAUGE
1 EACH MISCELLANEOUS
Qty: 150 EACH | Refills: 3 | Status: SHIPPED | OUTPATIENT
Start: 2020-03-17 | End: 2022-05-24 | Stop reason: ALTCHOICE

## 2020-03-17 RX ORDER — FLASH GLUCOSE SENSOR
2 KIT MISCELLANEOUS
Qty: 2 EACH | Refills: 3 | Status: SHIPPED | OUTPATIENT
Start: 2020-03-17 | End: 2020-04-14 | Stop reason: SDUPTHER

## 2020-03-17 RX ORDER — GLUCOSAMINE HCL/CHONDROITIN SU 500-400 MG
1 CAPSULE ORAL
Qty: 150 STRIP | Refills: 3 | Status: SHIPPED
Start: 2020-03-17 | End: 2020-07-14 | Stop reason: SDUPTHER

## 2020-03-17 RX ORDER — FLASH GLUCOSE SCANNING READER
1 EACH MISCELLANEOUS
Qty: 1 DEVICE | Refills: 0 | Status: SHIPPED | OUTPATIENT
Start: 2020-03-17 | End: 2020-05-28

## 2020-03-17 RX ORDER — SEMAGLUTIDE 1.34 MG/ML
1 INJECTION, SOLUTION SUBCUTANEOUS WEEKLY
Qty: 4 PEN | Refills: 3 | Status: SHIPPED | OUTPATIENT
Start: 2020-03-17 | End: 2020-06-23 | Stop reason: SDUPTHER

## 2020-03-17 ASSESSMENT — ENCOUNTER SYMPTOMS
DIARRHEA: 0
EYE PAIN: 0
COUGH: 0
WHEEZING: 0
EYE REDNESS: 0
SINUS PRESSURE: 0
SORE THROAT: 0
ABDOMINAL PAIN: 0
BLURRED VISION: 1
VOMITING: 0
NAUSEA: 0
RHINORRHEA: 0
SHORTNESS OF BREATH: 0

## 2020-03-17 NOTE — PROGRESS NOTES
Assessment:       Diagnosis Orders   1. Type 2 diabetes mellitus with complication, without long-term current use of insulin ([facility])  POCT Glucose    Hemoglobin A1C    Comprehensive Metabolic Panel   2. Gastroesophageal reflux disease without esophagitis       No history of Pancreatitis  Pt is uncircumcised, want to consider SGLT2  PLAN:     1. Continue Metformin 500 mg twice daily  2. Continue Glimiperide 4 mg daily   3. Start Omeprazole 20 mg daily   4. Start Ozempic 0.25 mg weekly one week after you start omeprazole   5. Increase Ozempic to 0.5 mg weekly if no nausea    6. Follow up in 4 weeks       Orders Placed This Encounter   Procedures    POCT Glucose     No orders of the defined types were placed in this encounter. Return in about 4 weeks (around 4/14/2020) for Diabetes. Subjective:     Chief Complaint   Patient presents with    Diabetes      Vitals:    03/17/20 0904   BP: (!) 142/78   Site: Right Upper Arm   Position: Sitting   Cuff Size: Large Adult   Pulse: 68   Weight: 269 lb (122 kg)   Height: 6' 2\" (1.88 m)     Wt Readings from Last 3 Encounters:   03/17/20 269 lb (122 kg)   03/13/20 260 lb (117.9 kg)   09/17/19 267 lb (121.1 kg)     BP Readings from Last 3 Encounters:   03/17/20 (!) 142/78   03/13/20 112/60   09/17/19 121/73     Martin Hernandez is a 60-year-old  type II diabetic male with glucose levels increasing. He has put on 20 pounds in the last 3 months. He is going to make dietary changes and will start him on a GLP-1    Diabetes   He presents for his initial diabetic visit. He has type 2 diabetes mellitus. No MedicAlert identification noted. The initial diagnosis of diabetes was made 20 years ago. His disease course has been worsening. There are no hypoglycemic associated symptoms. Pertinent negatives for hypoglycemia include no dizziness or headaches. Associated symptoms include blurred vision, polydipsia and polyuria. Pertinent negatives for diabetes include no chest pain and no fatigue. There are no hypoglycemic complications. Symptoms are stable. There are no diabetic complications. Risk factors for coronary artery disease include diabetes mellitus, male sex, obesity and hypertension. Current diabetic treatment includes oral agent (dual therapy). He is compliant with treatment all of the time. He is following a generally unhealthy diet. Meal planning includes avoidance of concentrated sweets. He has had a previous visit with a dietitian. He participates in exercise intermittently. He monitors blood glucose at home 3-4 x per day. Blood glucose monitoring compliance is excellent. An ACE inhibitor/angiotensin II receptor blocker is being taken. He does not see a podiatrist.Eye exam is current. Past Medical History:   Diagnosis Date    Bunion of great toe of right foot     Controlled diabetes mellitus type 2 with complications [facility]) 8887    Erectile dysfunction     Dr Smita Lemus     Folic acid deficiency 0235    Low HDL (under 40)     Non-compliance with treatment 2015    Obesity (BMI 30-39. 9)      Past Surgical History:   Procedure Laterality Date    ABDOMEN SURGERY  2009    for diverticulitis    ABDOMINAL HERNIA REPAIR  2010     Social History     Socioeconomic History    Marital status:      Spouse name: Not on file    Number of children: 11    Years of education: Not on file    Highest education level: Not on file   Occupational History    Occupation: ,      Employer: [employer]   Social Needs    Financial resource strain: Not on file    Food insecurity     Worry: Not on file     Inability: Not on file   [employer] needs     Medical: Not on file     Non-medical: Not on file   Tobacco Use    Smoking status: Never Smoker    Smokeless tobacco: Never Used   Substance and Sexual Activity    Alcohol use:  Yes     Alcohol/week: 0.0 standard drinks    Drug use: No    Sexual activity: Not on file   Lifestyle    Physical activity Days per week: Not on file     Minutes per session: Not on file    Stress: Not on file   Relationships    Social connections     Talks on phone: Not on file     Gets together: Not on file     Attends Catholic service: Not on file     Active member of club or organization: Not on file     Attends meetings of clubs or organizations: Not on file     Relationship status: Not on file    Intimate partner violence     Fear of current or ex partner: Not on file     Emotionally abused: Not on file     Physically abused: Not on file     Forced sexual activity: Not on file   Other Topics Concern    Not on file   Social History Narrative    Born in [facility] [place] [facility], father from [place], one sister and one brother    , spouse works in a [employer], office job, [employer] at [place] [address], 3 daughters and 2 sons    Lives in a house in [place], with spouse and 4 children    Hobbies: Jewish, community service, music (keyboard)             Family History   Problem Relation Age of Onset    Diabetes Father     Hypertension Mother      No Known Allergies    Current Outpatient Medications:     pravastatin (PRAVACHOL) 10 MG tablet, TAKE ONE TABLET BY MOUTH EVERY DAY IN THE EVENING, Disp: 30 tablet, Rfl: 3    metFORMIN (GLUCOPHAGE) 500 MG tablet, TAKE TWO TABLETS BY MOUTH TWICE A DAY WITH MEALS, Disp: 120 tablet, Rfl: 3    folic acid (FOLVITE) 1 MG tablet, TAKE ONE TABLET BY MOUTH EVERY DAY, Disp: 90 tablet, Rfl: 0    glimepiride (AMARYL) 4 MG tablet, Take 1.5 tablets by mouth every morning (before breakfast), Disp: 135 tablet, Rfl: 0    tadalafil (CIALIS) 20 MG tablet, Take 1 tablet by mouth daily as needed for Erectile Dysfunction, Disp: 30 tablet, Rfl: 11    lisinopril (PRINIVIL;ZESTRIL) 10 MG tablet, TAKE ONE-HALF TABLET BY MOUTH EVERY DAY, Disp: 45 tablet, Rfl: 5    FREESTYLE LITE strip, TEST TWICE A DAY AS DIRECTED, Disp: 100 strip, Rfl: 1    LANCETS ULTRA THIN MISC, 1 each by Does not apply route 2 times daily Dx: 250.00, non-insulin dependent, Disp: 100 each, Rfl: 3    Blood Glucose Monitoring Suppl (FREESTYLE LITE) ABIGAIL, 1 Device by Does not apply route daily Dx: 250.00, non-insulin dependent, Disp: 1 Device, Rfl: prn    Multiple Vitamin (MULTI-VITAMIN) TABS, Take 1 tablet by mouth daily. , Disp: , Rfl:   Lab Results   Component Value Date     03/14/2020    K 5.0 (H) 03/14/2020     03/14/2020    CO2 23 03/14/2020    BUN 12 03/14/2020    CREATININE 0.59 (L) 03/14/2020    GLUCOSE 231 03/17/2020    CALCIUM 9.4 03/14/2020    PROT 7.3 03/14/2020    LABALBU 4.7 (H) 03/14/2020    BILITOT 0.4 03/14/2020    ALKPHOS 132 (H) 03/14/2020    AST 24 03/14/2020    ALT 46 (H) 03/14/2020    LABGLOM >60.0 03/14/2020    GFRAA >60.0 03/14/2020    GLOB 2.6 03/14/2020     Lab Results   Component Value Date    WBC 6.7 09/17/2019    HGB 14.8 09/17/2019    HCT 42.3 09/17/2019    MCV 87.1 09/17/2019     09/17/2019     Lab Results   Component Value Date    LABA1C 8.3 (H) 03/14/2020    LABA1C 8.0 09/17/2019    LABA1C 6.4 07/18/2018     Lab Results   Component Value Date    CHOL 75 03/14/2020    CHOL 89 07/18/2018     Lab Results   Component Value Date    TRIG 89 03/14/2020    TRIG 65 07/18/2018     Lab Results   Component Value Date    HDL 32 (L) 03/14/2020    HDL 33 (L) 07/18/2018     Lab Results   Component Value Date    LDLCALC 25 03/14/2020    LDLCALC 43 07/18/2018     No results found for: LABVLDL, VLDL  No results found for: CHOLHDLRATIO  No results found for: TESTM  Lab Results   Component Value Date    TSH 1.150 09/17/2019       Review of Systems   Constitutional: Negative for chills, fatigue and fever. HENT: Negative for congestion, ear pain, postnasal drip, rhinorrhea, sinus pressure and sore throat. Eyes: Positive for blurred vision. Negative for pain and redness. Respiratory: Negative for cough, shortness of breath and wheezing.     Cardiovascular: Negative for chest pain,

## 2020-03-31 ENCOUNTER — TELEPHONE (OUTPATIENT)
Dept: DIABETES SERVICES | Age: 50
End: 2020-03-31

## 2020-04-14 ENCOUNTER — VIRTUAL VISIT (OUTPATIENT)
Dept: ENDOCRINOLOGY | Age: 50
End: 2020-04-14
Payer: COMMERCIAL

## 2020-04-14 PROCEDURE — 3052F HG A1C>EQUAL 8.0%<EQUAL 9.0%: CPT | Performed by: PHYSICIAN ASSISTANT

## 2020-04-14 PROCEDURE — 99214 OFFICE O/P EST MOD 30 MIN: CPT | Performed by: PHYSICIAN ASSISTANT

## 2020-04-14 RX ORDER — FLASH GLUCOSE SENSOR
2 KIT MISCELLANEOUS
Qty: 2 EACH | Refills: 6 | Status: SHIPPED | OUTPATIENT
Start: 2020-04-14 | End: 2021-01-05 | Stop reason: SDUPTHER

## 2020-04-14 RX ORDER — METFORMIN HYDROCHLORIDE 500 MG/1
500 TABLET, EXTENDED RELEASE ORAL
Qty: 60 TABLET | Refills: 3 | Status: SHIPPED | OUTPATIENT
Start: 2020-04-14 | End: 2020-04-14 | Stop reason: SDUPTHER

## 2020-04-14 RX ORDER — METFORMIN HYDROCHLORIDE 500 MG/1
500 TABLET, EXTENDED RELEASE ORAL
Qty: 60 TABLET | Refills: 6 | Status: SHIPPED | OUTPATIENT
Start: 2020-04-14 | End: 2020-11-19

## 2020-04-14 RX ORDER — OMEPRAZOLE 20 MG/1
20 CAPSULE, DELAYED RELEASE ORAL DAILY
Qty: 30 CAPSULE | Refills: 6 | Status: SHIPPED | OUTPATIENT
Start: 2020-04-14 | End: 2020-04-14 | Stop reason: SDUPTHER

## 2020-04-14 RX ORDER — OMEPRAZOLE 20 MG/1
20 CAPSULE, DELAYED RELEASE ORAL DAILY
Qty: 30 CAPSULE | Refills: 6 | Status: SHIPPED | OUTPATIENT
Start: 2020-04-14 | End: 2020-10-21 | Stop reason: SDUPTHER

## 2020-04-14 ASSESSMENT — ENCOUNTER SYMPTOMS
RHINORRHEA: 0
EYE REDNESS: 0
COUGH: 0
VOMITING: 0
SHORTNESS OF BREATH: 0
DIARRHEA: 0
NAUSEA: 0
WHEEZING: 0
EYE PAIN: 0
SORE THROAT: 0
ABDOMINAL PAIN: 0
SINUS PRESSURE: 0

## 2020-04-14 NOTE — PATIENT INSTRUCTIONS
1. Continue Metformin 500 mg twice daily  2. Continue Glimiperide 4 mg daily   3. Continue Omeprazole 20 mg daily   4.  Continue Ozempic 1.0 mg

## 2020-04-14 NOTE — PROGRESS NOTES
unhealthy diet. Meal planning includes avoidance of concentrated sweets. He has had a previous visit with a dietitian. He participates in exercise intermittently. He monitors blood glucose at home 3-4 x per day. Blood glucose monitoring compliance is excellent. An ACE inhibitor/angiotensin II receptor blocker is being taken. He does not see a podiatrist.Eye exam is current. Review of Systems   Constitutional: Negative for chills, fatigue and fever. HENT: Negative for congestion, ear pain, postnasal drip, rhinorrhea, sinus pressure and sore throat. Eyes: Negative for pain and redness. Respiratory: Negative for cough, shortness of breath and wheezing. Cardiovascular: Negative for chest pain, palpitations and leg swelling. Gastrointestinal: Negative for abdominal pain, diarrhea, nausea and vomiting. Endocrine: Negative for cold intolerance and heat intolerance. Genitourinary: Negative for difficulty urinating. Musculoskeletal: Negative for arthralgias. Skin: Negative for rash. Allergic/Immunologic: Negative for environmental allergies. Neurological: Negative for dizziness and headaches. Hematological: Negative for adenopathy. Psychiatric/Behavioral: Negative for dysphoric mood.        PHYSICAL EXAMINATION:  [ INSTRUCTIONS:  \"[x]\" Indicates a positive item  \"[]\" Indicates a negative item  -- DELETE ALL ITEMS NOT EXAMINED]  [x] Alert  [] Oriented to person/place/time    [] No apparent distress  [] Toxic appearing    [] Face flushed appearing [] Sclera clear  [] Lips are cyanotic      [x] Breathing appears normal  [] Appears tachypneic      [] Rash on visible skin    [] Cranial Nerves II-XII grossly intact    [x] Motor grossly intact in visible upper extremities  te  [x] Motor grossly intact in visible lower extremities    [x] Normal Mood  [] Anxious appearing    [] Depressed appearing  [] Confused appearing      [] Poor short term memory  [] Poor long term memory    [] OTHER:      Lab MD Hector   metFORMIN (GLUCOPHAGE) 500 MG tablet TAKE TWO TABLETS BY MOUTH TWICE A DAY WITH MEALS  Suri Alas MD   folic acid (FOLVITE) 1 MG tablet TAKE ONE TABLET BY MOUTH EVERY DAY  Suri Alas MD   tadalafil (CIALIS) 20 MG tablet Take 1 tablet by mouth daily as needed for Erectile Dysfunction  Francisco Aguilar MD   lisinopril (PRINIVIL;ZESTRIL) 10 MG tablet TAKE ONE-HALF TABLET BY MOUTH EVERY DAY  Suri Alas MD   FREESTYLE LITE strip TEST TWICE A DAY AS DIRECTED  Suri Alas MD   LANCETS ULTRA THIN MISC 1 each by Does not apply route 2 times daily Dx: 250.00, non-insulin dependent  Suri Alas MD   Blood Glucose Monitoring Suppl (FREESTYLE LITE) ABIGAIL 1 Device by Does not apply route daily Dx: 250.00, non-insulin dependent  Suri Alas MD   Multiple Vitamin (MULTI-VITAMIN) TABS Take 1 tablet by mouth daily. Historical Provider, MD       Social History     Tobacco Use    Smoking status: Never Smoker    Smokeless tobacco: Never Used   Substance Use Topics    Alcohol use: Yes     Alcohol/week: 0.0 standard drinks    Drug use: No        No Known Allergies,   Past Medical History:   Diagnosis Date    Bunion of great toe of right foot     [facility]    Erectile dysfunction     Dr Stevie Magdaleno     Folic acid deficiency 2499    Low HDL (under 40)     Non-compliance with treatment 2015    Obesity (BMI 30-39.9)    ,   Family History   Problem Relation Age of Onset    Diabetes Father     Hypertension Mother          Due to this being a TeleHealth encounter, evaluation of the following organ systems is limited: Vitals/Constitutional/EENT/Resp/CV/GI//MS/Neuro/Skin/Heme-Lymph-Imm. No follow-ups on file. An  electronic signature was used to authenticate this note.     --ESTEFANY Galvan on 4/14/2020 at 9:42 AM        Pursuant to the emergency declaration under the [facility] [place], [address] waiver authority and the [employer] Act, this Virtual  Visit was conducted, with patient's consent, to reduce the patient's risk of exposure to COVID-19 and provide continuity of care for an established patient. Services were provided through a video synchronous discussion virtually to substitute for in-person clinic visit.

## 2020-05-01 RX ORDER — FOLIC ACID 1 MG/1
TABLET ORAL
Qty: 90 TABLET | Refills: 0 | Status: SHIPPED | OUTPATIENT
Start: 2020-05-01 | End: 2020-08-25

## 2020-05-01 NOTE — TELEPHONE ENCOUNTER
Pharmacy requesting medication refill.  Please approve or deny this request.    Rx requested:  Requested Prescriptions     Pending Prescriptions Disp Refills    folic acid (FOLVITE) 1 MG tablet [Pharmacy Med Name: FOLIC ACID 1MG TABS] 90 tablet 0     Sig: TAKE ONE TABLET BY MOUTH EVERY DAY         Last Office Visit:   9/17/2019      Next Visit Date:  Future Appointments   Date Time Provider Julissa Yarbrough   6/10/2020  2:00 PM Jay Shaikh MD [address]   7/14/2020  9:20 AM Len Weathers, [address] [place] [address]   3/12/2021  9:00 AM [facility]inne Diver[facility]

## 2020-05-28 RX ORDER — FLASH GLUCOSE SCANNING READER
EACH MISCELLANEOUS
Qty: 1 DEVICE | Refills: 0 | Status: SHIPPED | OUTPATIENT
Start: 2020-05-28

## 2020-06-10 ENCOUNTER — HOSPITAL ENCOUNTER (OUTPATIENT)
Dept: NEUROLOGY | Age: 50
Discharge: HOME | End: 2020-06-10
Payer: COMMERCIAL

## 2020-06-10 PROCEDURE — 95910 NRV CNDJ TEST 7-8 STUDIES: CPT

## 2020-06-10 PROCEDURE — 95886 MUSC TEST DONE W/N TEST COMP: CPT

## 2020-06-11 NOTE — PROCEDURES
[address]                      [address], [address] [facility]                             ELECTROMYOGRAM REPORT    PATIENT NAME: Gonzalez Hebert                      :        1970  MED REC NO:   [id number]                            ROOM:  ACCOUNT NO:   [de-identified]                           ADMIT DATE: 06/10/2020  PROVIDER:     Roseanna Douglas MD    DATE OF EM/10/2020    REFERRING PROVIDER:  Roseanna Douglas MD.    REASON FOR THE STUDY:  The patient had numbness and tingling in the  feet. He has a history of diabetes mellitus type 2, non-insulin  dependent. He is doing better with the weight control and with better control of  his diabetes. FINDINGS:  Motor nerve conduction velocities are slowed in the right  common peroneal nerve and the right tibial nerve, but are normal in  other nerves tested. Distal motor latencies are normal in all the nerves tested. F-wave latencies are delayed in all the nerves tested. On stimulating the sural and the peroneal nerves, there was no response. On the concentric needle electrode examination, denervation changes are  present in the extensor digitorum brevis muscles bilaterally, being  worse on the right side. Mild denervation changes are seen in the anterior tibial muscles  bilaterally also. CLINICAL INTERPRETATION:  EMG studies are consistent with the patient's  clinical diagnosis of peripheral neuropathy. This is most probably due  to diabetes mellitus type 2, non-insulin dependent. If clinically indicated, other causes of peripheral neuropathy could  also be looked into such as exposure to toxins, autoimmune disorder,  hypothyroidism, etc.    The patient is doing somewhat better. If clinically indicated, we may repeat the study in a year.         Ivon Hawley MD    D: 06/10/2020 15:05:14       T: 06/10/2020 15:37:10     [id number]  Job#: [id number]     Doc#: [id number]    CC:

## 2020-06-23 RX ORDER — SEMAGLUTIDE 1.34 MG/ML
1 INJECTION, SOLUTION SUBCUTANEOUS WEEKLY
Qty: 9 PEN | Refills: 3 | Status: SHIPPED | OUTPATIENT
Start: 2020-06-23 | End: 2021-06-01

## 2020-07-02 RX ORDER — PRAVASTATIN SODIUM 10 MG
TABLET ORAL
Qty: 30 TABLET | Refills: 3 | Status: SHIPPED | OUTPATIENT
Start: 2020-07-02 | End: 2020-11-25 | Stop reason: SDUPTHER

## 2020-07-02 NOTE — TELEPHONE ENCOUNTER
Pharmacy requesting medication refill.  Please approve or deny this request.    Rx requested:  Requested Prescriptions     Pending Prescriptions Disp Refills    pravastatin (PRAVACHOL) 10 MG tablet [Pharmacy Med Name: PRAVASTATIN SODIUM 10MG TABS] 30 tablet 3     Sig: TAKE ONE TABLET BY MOUTH EVERY DAY IN THE EVENING         Last Office Visit:   04/14/2020      Next Visit Date:  Future Appointments   Date Time Provider Julissa Yarbrough   7/14/2020  9:20 AM Mel Arteaga, [address] [place] [address]   3/12/2021  9:00 AM Fausto Acosta MD [facility]

## 2020-07-14 ENCOUNTER — OFFICE VISIT (OUTPATIENT)
Dept: ENDOCRINOLOGY | Age: 50
End: 2020-07-14
Payer: COMMERCIAL

## 2020-07-14 VITALS
HEART RATE: 70 BPM | WEIGHT: 250 LBS | HEIGHT: 74 IN | DIASTOLIC BLOOD PRESSURE: 77 MMHG | BODY MASS INDEX: 32.08 KG/M2 | SYSTOLIC BLOOD PRESSURE: 117 MMHG

## 2020-07-14 LAB
CHP ED QC CHECK: NORMAL
GLUCOSE BLD-MCNC: 186 MG/DL
HBA1C MFR BLD: 5.7 %

## 2020-07-14 PROCEDURE — 82962 GLUCOSE BLOOD TEST: CPT | Performed by: PHYSICIAN ASSISTANT

## 2020-07-14 PROCEDURE — 83036 HEMOGLOBIN GLYCOSYLATED A1C: CPT | Performed by: PHYSICIAN ASSISTANT

## 2020-07-14 PROCEDURE — 99214 OFFICE O/P EST MOD 30 MIN: CPT | Performed by: PHYSICIAN ASSISTANT

## 2020-07-14 RX ORDER — LISINOPRIL 5 MG/1
5 TABLET ORAL DAILY
Qty: 90 TABLET | Refills: 5 | Status: SHIPPED | OUTPATIENT
Start: 2020-07-14 | End: 2020-10-12 | Stop reason: SDUPTHER

## 2020-07-14 ASSESSMENT — ENCOUNTER SYMPTOMS
VOMITING: 0
SINUS PRESSURE: 0
EYE PAIN: 0
SORE THROAT: 0
BLURRED VISION: 1
NAUSEA: 0
WHEEZING: 0
COUGH: 0
RHINORRHEA: 0
EYE REDNESS: 0
DIARRHEA: 0
SHORTNESS OF BREATH: 0
ABDOMINAL PAIN: 0

## 2020-07-14 NOTE — PROGRESS NOTES
Assessment:       Diagnosis Orders   1. Type 2 diabetes mellitus with complication, without long-term current use of insulin ([facility])  POCT Glucose    POCT glycosylated hemoglobin (Hb A1C)    Hemoglobin A1C    Comprehensive Metabolic Panel   2. Gastroesophageal reflux disease without esophagitis  Mimi Chapin [place], [facility], [place]     No history of Pancreatitis  Pt is uncircumcised, want to consider SGLT2  PLAN:     1. Continue Metformin 500 mg twice daily  2. Stop Glimiperide 4 mg daily   3. Continue Omeprazole 20 mg daily   4. Continue Ozempic 1.0 weekly   5. Follow up in 3 months     Orders Placed This Encounter   Procedures    Hemoglobin A1C     Standing Status:   Future     Standing Expiration Date:   7/14/2021    Comprehensive Metabolic Panel     Standing Status:   Future     Standing Expiration Date:   7/14/2021   Mimi Monet [place], [facility], [place]     Referral Priority:   Routine     Referral Type:   Eval and Treat     Referral Reason:   Specialty Services Required     Requested Specialty:   Physician Assistant     Number of Visits Requested:   1    POCT Glucose    POCT glycosylated hemoglobin (Hb A1C)     Orders Placed This Encounter   Medications    lisinopril (PRINIVIL;ZESTRIL) 5 MG tablet     Sig: Take 1 tablet by mouth daily     Dispense:  90 tablet     Refill:  5     Return in about 3 months (around 10/14/2020) for Diabetes.   Subjective:     Chief Complaint   Patient presents with    Diabetes     pt wants to talk to you about his lisinopril      Vitals:    07/14/20 0919   BP: 117/77   Site: Right Upper Arm   Position: Sitting   Cuff Size: Large Adult   Pulse: 70   Weight: 250 lb (113.4 kg)   Height: 6' 2\" (1.88 m)     Wt Readings from Last 3 Encounters:   07/14/20 250 lb (113.4 kg)   03/17/20 269 lb (122 kg)   03/13/20 260 lb (117.9 kg)     BP Readings from Last 3 Encounters:   07/14/20 117/77   03/17/20 (!) 142/78   03/13/20 112/60     Ramana Prajapati is a 77-year-old  type II diabetic male with glucose levels increasing. He has put on 20 pounds in the last 3 months. He is going to make dietary changes and will start him on a GLP-1    Diabetes   He presents for his initial diabetic visit. He has type 2 diabetes mellitus. No MedicAlert identification noted. The initial diagnosis of diabetes was made 20 years ago. His disease course has been worsening. There are no hypoglycemic associated symptoms. Pertinent negatives for hypoglycemia include no dizziness, headaches or nervousness/anxiousness. Associated symptoms include blurred vision. Pertinent negatives for diabetes include no chest pain, no fatigue, no polydipsia and no polyuria. There are no hypoglycemic complications. Symptoms are stable. There are no diabetic complications. Risk factors for coronary artery disease include diabetes mellitus, male sex, obesity and hypertension. Current diabetic treatment includes oral agent (dual therapy). He is compliant with treatment all of the time. He is following a generally unhealthy diet. Meal planning includes avoidance of concentrated sweets. He has had a previous visit with a dietitian. He participates in exercise intermittently. He monitors blood glucose at home 3-4 x per day. Blood glucose monitoring compliance is excellent. An ACE inhibitor/angiotensin II receptor blocker is being taken. He does not see a podiatrist.Eye exam is current. Past Medical History:   Diagnosis Date    Bunion of great toe of right foot     Controlled diabetes mellitus type 2 with complications ([address] 3535    Diabetic neuropathy associated with type 2 diabetes mellitus (HCC)     EMG Dr Kia Zuleta Erectile dysfunction     Dr Floresita Guallpa Folic acid deficiency 4505    Low HDL (under 40)     Non-compliance with treatment 2015    Obesity (BMI 30-39. 9)      Past Surgical History:   Procedure Laterality Date    ABDOMEN SURGERY  2009    for diverticulitis    ABDOMINAL HERNIA REPAIR  2010     Social History     Socioeconomic History    Marital status:      Spouse name: Not on file    Number of children: 11    Years of education: Not on file    Highest education level: Not on file   Occupational History    Occupation: ,      Employer: [employer]   Social Needs    Financial resource strain: Not on file    Food insecurity     Worry: Not on file     Inability: Not on file   [employer] needs     Medical: Not on file     Non-medical: Not on file   Tobacco Use    Smoking status: Never Smoker    Smokeless tobacco: Never Used   Substance and Sexual Activity    Alcohol use:  Yes     Alcohol/week: 0.0 standard drinks    Drug use: No    Sexual activity: Not on file   Lifestyle    Physical activity     Days per week: Not on file     Minutes per session: Not on file    Stress: Not on file   Relationships    Social connections     Talks on phone: Not on file     Gets together: Not on file     Attends Restorationism service: Not on file     Active member of club or organization: Not on file     Attends meetings of clubs or organizations: Not on file     Relationship status: Not on file    Intimate partner violence     Fear of current or ex partner: Not on file     Emotionally abused: Not on file     Physically abused: Not on file     Forced sexual activity: Not on file   Other Topics Concern    Not on file   Social History Narrative    Born in [facility], father from [place], one sister and one brother    , spouse works in a [employer], office job, [employer] at [facility] [employer], 3 daughters and 2 sons    Lives in a house in [place] [place], with spouse and 4 children    Hobbies: Yazidi, community service, music (keyboard)             Family History   Problem Relation Age of Onset    Diabetes Father     Hypertension Mother      No Known Allergies    Current Outpatient Medications:     lisinopril (PRINIVIL;ZESTRIL) 5 MG tablet, Take 1 tablet by mouth daily, Disp: 90 tablet, Rfl: 5    pravastatin (PRAVACHOL) 10 MG tablet, TAKE ONE TABLET BY MOUTH EVERY DAY IN THE EVENING, Disp: 30 tablet, Rfl: 3    Semaglutide, 1 MG/DOSE, (OZEMPIC, 1 MG/DOSE,) 2 MG/1.5ML SOPN, Inject 1 mg into the skin once a week, Disp: 9 pen, Rfl: 3    Continuous Blood Gluc  (FREESTYLE AMANDA 14 DAY READER) ABIGAIL, USE AS DIRECTED BEFORE MEALS AND NIGHTLY, Disp: 1 Device, Rfl: 0    folic acid (FOLVITE) 1 MG tablet, TAKE ONE TABLET BY MOUTH EVERY DAY, Disp: 90 tablet, Rfl: 0    metFORMIN (GLUCOPHAGE XR) 500 MG extended release tablet, Take 1 tablet by mouth 2 times daily (before meals), Disp: 60 tablet, Rfl: 6    omeprazole (PRILOSEC) 20 MG delayed release capsule, Take 1 capsule by mouth daily, Disp: 30 capsule, Rfl: 6    Continuous Blood Gluc Sensor (FREESTYLE AMANDA 14 DAY SENSOR) MISC, 2 Devices by Does not apply route every 14 days, Disp: 2 each, Rfl: 6    glimepiride (AMARYL) 4 MG tablet, TAKE ONE AND ONE-HALF TABLETS BY MOUTH ONCE DAILY IN THE MORNING BEFORE BREAKFAST, Disp: 135 tablet, Rfl: 3    Lancets MISC, 1 each by Does not apply route 4 times daily (before meals and nightly), Disp: 150 each, Rfl: 3    tadalafil (CIALIS) 20 MG tablet, Take 1 tablet by mouth daily as needed for Erectile Dysfunction, Disp: 30 tablet, Rfl: 11    FREESTYLE LITE strip, TEST TWICE A DAY AS DIRECTED, Disp: 100 strip, Rfl: 1    Blood Glucose Monitoring Suppl (FREESTYLE LITE) ABIGAIL, 1 Device by Does not apply route daily Dx: 250.00, non-insulin dependent, Disp: 1 Device, Rfl: prn    Multiple Vitamin (MULTI-VITAMIN) TABS, Take 1 tablet by mouth daily.   , Disp: , Rfl:   Lab Results   Component Value Date     03/14/2020    K 5.0 (H) 03/14/2020     03/14/2020    CO2 23 03/14/2020    BUN 12 03/14/2020    CREATININE 0.59 (L) 03/14/2020    GLUCOSE 186 07/14/2020    CALCIUM 9.4 03/14/2020    PROT 7.3 03/14/2020    LABALBU 4.7 (H) 03/14/2020    BILITOT 0.4 03/14/2020 ALKPHOS 132 (H) 03/14/2020    AST 24 03/14/2020    ALT 46 (H) 03/14/2020    LABGLOM >60.0 03/14/2020    GFRAA >60.0 03/14/2020    GLOB 2.6 03/14/2020     Lab Results   Component Value Date    WBC 6.7 09/17/2019    HGB 14.8 09/17/2019    HCT 42.3 09/17/2019    MCV 87.1 09/17/2019     09/17/2019     Lab Results   Component Value Date    LABA1C 5.7 07/14/2020    LABA1C 8.3 (H) 03/14/2020    LABA1C 8.0 09/17/2019     Lab Results   Component Value Date    CHOL 75 03/14/2020    CHOL 89 07/18/2018     Lab Results   Component Value Date    TRIG 89 03/14/2020    TRIG 65 07/18/2018     Lab Results   Component Value Date    HDL 32 (L) 03/14/2020    HDL 33 (L) 07/18/2018     Lab Results   Component Value Date    LDLCALC 25 03/14/2020    LDLCALC 43 07/18/2018     No results found for: LABVLDL, VLDL  No results found for: CHOLHDLRATIO  No results found for: TESTM  Lab Results   Component Value Date    TSH 1.150 09/17/2019       Review of Systems   Constitutional: Negative for chills, fatigue and fever. HENT: Negative for congestion, ear pain, postnasal drip, rhinorrhea, sinus pressure and sore throat. Eyes: Positive for blurred vision. Negative for pain and redness. Respiratory: Negative for cough, shortness of breath and wheezing. Cardiovascular: Negative for chest pain, palpitations and leg swelling. Gastrointestinal: Negative for abdominal pain, diarrhea, nausea and vomiting. Endocrine: Negative for cold intolerance, heat intolerance, polydipsia and polyuria. Genitourinary: Negative for difficulty urinating. Musculoskeletal: Negative for arthralgias. Skin: Negative for rash. Allergic/Immunologic: Negative for environmental allergies. Neurological: Negative for dizziness and headaches. Hematological: Negative for adenopathy. Psychiatric/Behavioral: The patient is not nervous/anxious. Objective:   Physical Exam  Vitals signs reviewed.    Constitutional:       Appearance: He is well-developed. HENT:      Head: Normocephalic and atraumatic. Nose: No rhinorrhea. Mouth/Throat:      Mouth: Mucous membranes are moist.   Eyes:      Conjunctiva/sclera: Conjunctivae normal.   Neck:      Musculoskeletal: Normal range of motion and neck supple. Cardiovascular:      Rate and Rhythm: Normal rate and regular rhythm. Heart sounds: Normal heart sounds. Pulmonary:      Effort: Pulmonary effort is normal.      Breath sounds: Normal breath sounds. Abdominal:      General: Bowel sounds are normal.      Palpations: Abdomen is soft. Musculoskeletal: Normal range of motion. Skin:     General: Skin is warm and dry. Neurological:      Mental Status: He is alert and oriented to person, place, and time.    Psychiatric:         Mood and Affect: Mood normal.

## 2020-08-06 ENCOUNTER — OFFICE VISIT (OUTPATIENT)
Dept: FAMILY MEDICINE CLINIC | Age: 50
End: 2020-08-06
Payer: COMMERCIAL

## 2020-08-06 VITALS
TEMPERATURE: 96.8 F | HEART RATE: 66 BPM | OXYGEN SATURATION: 98 % | RESPIRATION RATE: 16 BRPM | SYSTOLIC BLOOD PRESSURE: 124 MMHG | BODY MASS INDEX: 32.16 KG/M2 | WEIGHT: 250.6 LBS | HEIGHT: 74 IN | DIASTOLIC BLOOD PRESSURE: 72 MMHG

## 2020-08-06 PROCEDURE — 99214 OFFICE O/P EST MOD 30 MIN: CPT | Performed by: PHYSICIAN ASSISTANT

## 2020-08-06 ASSESSMENT — PATIENT HEALTH QUESTIONNAIRE - PHQ9
SUM OF ALL RESPONSES TO PHQ QUESTIONS 1-9: 0
2. FEELING DOWN, DEPRESSED OR HOPELESS: 0
SUM OF ALL RESPONSES TO PHQ9 QUESTIONS 1 & 2: 0
1. LITTLE INTEREST OR PLEASURE IN DOING THINGS: 0
SUM OF ALL RESPONSES TO PHQ QUESTIONS 1-9: 0

## 2020-08-06 NOTE — PROGRESS NOTES
[address] [facility], 48 y.o. male presents today with:  Chief Complaint   Patient presents with   Berkley Argueta Doctor     check up,      SHANTA CruzAntonieta Miriam is in the office today to establish care. Previous PCP: Dr. Malini Moe. Referred to our office by Kelsi Garcia PA-C. No acute questions or concerns today. Due for colon ca screening. Type 2 diabetes. Followed by Kelsi Garcia PA-C. Well-controlled diabetes. Last HgbA1c: 5.7%. Has freestyle gallo. Average sugar is around 110-130. He is on ace-I; statin therapy. Current therapy: ozempic weekly, metformin. GERD. Controlled with PPI. Denies worsening reflux. BPH with LUTS and ED. Followed by Dr. Zane Pendleton. Taking cialis daily. Obesity. Actively losing weight through lifestyle changes. Walks daily for exercise. Doing great on regimen. Review of Systems   Constitutional: Negative for activity change, appetite change, chills, fatigue, fever and unexpected weight change. HENT: Negative for nosebleeds. Eyes: Negative for photophobia. Respiratory: Negative for chest tightness and shortness of breath. Cardiovascular: Negative for chest pain, palpitations and leg swelling. Gastrointestinal: Negative for abdominal pain, blood in stool, diarrhea, nausea and vomiting. Genitourinary: Negative for decreased urine volume, difficulty urinating, frequency and urgency. Musculoskeletal: Negative for arthralgias and myalgias. Skin: Negative for rash. Neurological: Negative for dizziness and headaches. Hematological: Does not bruise/bleed easily. Psychiatric/Behavioral: Negative for dysphoric mood. The patient is not nervous/anxious.       Past Medical History:   Diagnosis Date    Bunion of great toe of right foot     Controlled diabetes mellitus type 2 with complications ([facility] [address] 1201    Diabetic neuropathy associated with type 2 diabetes mellitus (HCC)     EMG Dr France Sorenson Erectile dysfunction Dr Munira Bonilla acid deficiency 3082    Low HDL (under 40)     Non-compliance with treatment 2015    Obesity (BMI 30-39. 9)      Past Surgical History:   Procedure Laterality Date    ABDOMEN SURGERY  2009    for diverticulitis    ABDOMINAL HERNIA REPAIR  2010     Social History     Socioeconomic History    Marital status:      Spouse name: Not on file    Number of children: 11    Years of education: Not on file    Highest education level: Not on file   Occupational History    Occupation: ,      Employer: [employer]   Social Needs    Financial resource strain: Not on file    Food insecurity     Worry: Not on file     Inability: Not on file   [employer] needs     Medical: Not on file     Non-medical: Not on file   Tobacco Use    Smoking status: Never Smoker    Smokeless tobacco: Never Used   Substance and Sexual Activity    Alcohol use:  Yes     Alcohol/week: 0.0 standard drinks    Drug use: No    Sexual activity: Not on file   Lifestyle    Physical activity     Days per week: Not on file     Minutes per session: Not on file    Stress: Not on file   Relationships    Social connections     Talks on phone: Not on file     Gets together: Not on file     Attends Pentecostalism service: Not on file     Active member of club or organization: Not on file     Attends meetings of clubs or organizations: Not on file     Relationship status: Not on file    Intimate partner violence     Fear of current or ex partner: Not on file     Emotionally abused: Not on file     Physically abused: Not on file     Forced sexual activity: Not on file   Other Topics Concern    Not on file   Social History Narrative    Born in [facility], father from [place], one sister and one brother    , spouse works in a [employer], office job, [employer] at [employer], 3 daughters and 2 sons    Lives in a house in [place] [facility] [facility] [place], with spouse and 4 children    Hobbies: [employer], [employer] service, music (keyboard)             Family History   Problem Relation Age of Onset    Diabetes Father     Hypertension Mother      No Known Allergies  Current Outpatient Medications   Medication Sig Dispense Refill    lisinopril (PRINIVIL;ZESTRIL) 5 MG tablet Take 1 tablet by mouth daily 90 tablet 5    pravastatin (PRAVACHOL) 10 MG tablet TAKE ONE TABLET BY MOUTH EVERY DAY IN THE EVENING 30 tablet 3    Semaglutide, 1 MG/DOSE, (OZEMPIC, 1 MG/DOSE,) 2 MG/1.5ML SOPN Inject 1 mg into the skin once a week 9 pen 3    Continuous Blood Gluc  (FREESTYLE AMANDA 14 DAY READER) ABIGAIL USE AS DIRECTED BEFORE MEALS AND NIGHTLY 1 Device 0    folic acid (FOLVITE) 1 MG tablet TAKE ONE TABLET BY MOUTH EVERY DAY 90 tablet 0    metFORMIN (GLUCOPHAGE XR) 500 MG extended release tablet Take 1 tablet by mouth 2 times daily (before meals) 60 tablet 6    omeprazole (PRILOSEC) 20 MG delayed release capsule Take 1 capsule by mouth daily 30 capsule 6    Continuous Blood Gluc Sensor (FREESTYLE AMANDA 14 DAY SENSOR) MISC 2 Devices by Does not apply route every 14 days 2 each 6    Lancets MISC 1 each by Does not apply route 4 times daily (before meals and nightly) 150 each 3    tadalafil (CIALIS) 20 MG tablet Take 1 tablet by mouth daily as needed for Erectile Dysfunction 30 tablet 11    FREESTYLE LITE strip TEST TWICE A DAY AS DIRECTED 100 strip 1    Blood Glucose Monitoring Suppl (FREESTYLE LITE) ABIGAIL 1 Device by Does not apply route daily Dx: 250.00, non-insulin dependent 1 Device prn    Multiple Vitamin (MULTI-VITAMIN) TABS Take 1 tablet by mouth daily. No current facility-administered medications for this visit. PMH, Surgical Hx, Family Hx, and Social Hx reviewed and updated. Health Maintenance reviewed.     Objective  Vitals:    08/06/20 0935   BP: 124/72   Site: Left Upper Arm   Position: Sitting   Cuff Size: Large Adult   Pulse: 66   Resp: 16   Temp: 96.8 °F (36 °C) TempSrc: Temporal   SpO2: 98%   Weight: 250 lb 9.6 oz (113.7 kg)   Height: 6' 2\" (1.88 m)     BP Readings from Last 3 Encounters:   08/06/20 124/72   07/14/20 117/77   03/17/20 (!) 142/78     Wt Readings from Last 3 Encounters:   08/06/20 250 lb 9.6 oz (113.7 kg)   07/14/20 250 lb (113.4 kg)   03/17/20 269 lb (122 kg)     Physical Exam  Vitals signs reviewed. Constitutional:       General: He is not in acute distress. Appearance: He is well-developed. He is not ill-appearing, toxic-appearing or diaphoretic. HENT:      Head: Normocephalic and atraumatic. Right Ear: Tympanic membrane, ear canal and external ear normal.      Left Ear: Tympanic membrane, ear canal and external ear normal.      Nose: Nose normal.      Mouth/Throat:      Mouth: Mucous membranes are moist.   Eyes:      Conjunctiva/sclera: Conjunctivae normal.   Neck:      Musculoskeletal: Normal range of motion. Cardiovascular:      Rate and Rhythm: Normal rate and regular rhythm. Pulmonary:      Effort: Pulmonary effort is normal.      Breath sounds: Normal breath sounds. Skin:     General: Skin is warm and dry. Neurological:      Mental Status: He is alert and oriented to person, place, and time. Cranial Nerves: No cranial nerve deficit. Psychiatric:         Mood and Affect: Mood normal.         Behavior: Behavior normal.         Thought Content: Thought content normal.         Judgment: Judgment normal.       Assessment & Plan   Deya Martinez was seen today for established new doctor. Diagnoses and all orders for this visit:    Type 2 diabetes mellitus with complication, without long-term current use of insulin ([facility])  Comments:  Very well-controlled. Doing well on medications. Follow up with Susie Zuniga. Erectile dysfunction associated with type 2 diabetes mellitus ([address]  Comments:  Stable on cialis. BPH without urinary obstruction  Comments:  Stable. Followed by urology.      Gastroesophageal reflux disease without esophagitis  Comments:  Taking PPI. Obesity (BMI 30-39. 9)  Comments:  Actively losing weight. Colon cancer screening  -     [facility] Gastroenter[facility], [facility]    Patient established care. Doing well on medications. Follow up with me in 6 months. Sooner if there are any questions or concerns. Orders Placed This Encounter   Procedures   [address] [facility], [facility]     Referral Priority:   Routine     Referral Type:   Eval and Treat     Referral Reason:   Specialty Services Required     Requested Specialty:   Gastroenterology     Number of Visits Requested:   1     No orders of the defined types were placed in this encounter. Medications Discontinued During This Encounter   Medication Reason    glimepiride (AMARYL) 4 MG tablet Therapy completed     Return in about 6 months (around 2/6/2021) for follow up in office, routine . Reviewed with the patient: current clinical status, medications, activities and diet. Side effects, adverse effects of the medication prescribed today, as well as treatment plan/ rationale and result expectations have been discussed with the patient who expresses understanding and desires to proceed. Close follow up to evaluate treatment results and for coordination of care. I have reviewed the patient's medical history in detail and updated the computerized patient record.     Codie Andrade PA-C

## 2020-08-09 PROBLEM — N40.0 BPH WITHOUT URINARY OBSTRUCTION: Status: ACTIVE | Noted: 2020-08-09

## 2020-08-09 ASSESSMENT — ENCOUNTER SYMPTOMS
CHEST TIGHTNESS: 0
SHORTNESS OF BREATH: 0
VOMITING: 0
PHOTOPHOBIA: 0
BLOOD IN STOOL: 0
NAUSEA: 0
ABDOMINAL PAIN: 0
DIARRHEA: 0

## 2020-08-18 ENCOUNTER — TELEPHONE (OUTPATIENT)
Dept: ENDOSCOPY | Age: 50
End: 2020-08-18

## 2020-08-18 NOTE — TELEPHONE ENCOUNTER
called in prescription for Trilyte bowel prep kit to [facility] in [facility]

## 2020-08-25 ENCOUNTER — NURSE ONLY (OUTPATIENT)
Dept: PRIMARY CARE CLINIC | Age: 50
End: 2020-08-25

## 2020-08-27 LAB
SARS-COV-2: NOT DETECTED
SOURCE: NORMAL

## 2020-08-31 ENCOUNTER — ANESTHESIA EVENT (OUTPATIENT)
Dept: ENDOSCOPY | Age: 50
End: 2020-08-31
Payer: COMMERCIAL

## 2020-08-31 NOTE — ANESTHESIA PRE PROCEDURE
Department of Anesthesiology  Preprocedure Note       Name:  Kelvin Lowe   Age:  48 y.o.  :  1970                                          MRN:  [id number]         Date:  2020      Surgeon: Jesus Harvey):  Patricia Bridges MD    Procedure: Procedure(s):  COLORECTAL CANCER SCREENING, HIGH RISK    Medications prior to admission:   Prior to Admission medications    Medication Sig Start Date End Date Taking?  Authorizing Provider   folic acid (FOLVITE) 1 MG tablet TAKE ONE TABLET BY MOUTH EVERY DAY 20   Codie Andrade PA-C   lisinopril (PRINIVIL;ZESTRIL) 5 MG tablet Take 1 tablet by mouth daily 20   ESTEFANY Rodriguez   pravastatin (PRAVACHOL) 10 MG tablet TAKE ONE TABLET BY MOUTH EVERY DAY IN THE EVENING 20   ESTEFANY Rodriguez   Semaglutide, 1 MG/DOSE, (OZEMPIC, 1 MG/DOSE,) 2 MG/1.5ML SOPN Inject 1 mg into the skin once a week 20   ESTEFANY Rodriguez   Continuous Blood Gluc  (FREESTYLE AMANDA 14 DAY READER) ABIGAIL USE AS DIRECTED BEFORE MEALS AND NIGHTLY 20   Zachery Cisneros MD   metFORMIN (GLUCOPHAGE XR) 500 MG extended release tablet Take 1 tablet by mouth 2 times daily (before meals) 20   Zachery Cisneros MD   omeprazole (PRILOSEC) 20 MG delayed release capsule Take 1 capsule by mouth daily 20   Zachery Cisneros MD   Continuous Blood Gluc Sensor (FREESTYLE AMANDA 14 DAY SENSOR) MISC 2 Devices by Does not apply route every 14 days 20   Zachery Cisneros MD   Lancets MISC 1 each by Does not apply route 4 times daily (before meals and nightly) 3/17/20   Zachery Cisneros MD   tadalafil (CIALIS) 20 MG tablet Take 1 tablet by mouth daily as needed for Erectile Dysfunction 19   Priya Raymond MD   FREESTYLE LITE strip TEST TWICE A DAY AS DIRECTED 5/15/17   Elijah Navas MD   Blood Glucose Monitoring Suppl (FREESTYLE LITE) ABIGAIL 1 Device by Does not apply route daily Dx: 250.00, non-insulin dependent 10/6/16   Elijah Navas MD   Multiple Vitamin (MULTI-VITAMIN) TABS Take 1 tablet by mouth daily. Historical Provider, MD       Current medications:    No current facility-administered medications for this encounter. Current Outpatient Medications   Medication Sig Dispense Refill    folic acid (FOLVITE) 1 MG tablet TAKE ONE TABLET BY MOUTH EVERY DAY 90 tablet 0    lisinopril (PRINIVIL;ZESTRIL) 5 MG tablet Take 1 tablet by mouth daily 90 tablet 5    pravastatin (PRAVACHOL) 10 MG tablet TAKE ONE TABLET BY MOUTH EVERY DAY IN THE EVENING 30 tablet 3    Semaglutide, 1 MG/DOSE, (OZEMPIC, 1 MG/DOSE,) 2 MG/1.5ML SOPN Inject 1 mg into the skin once a week 9 pen 3    Continuous Blood Gluc  (FREESTYLE AMANDA 14 DAY READER) ABIGAIL USE AS DIRECTED BEFORE MEALS AND NIGHTLY 1 Device 0    metFORMIN (GLUCOPHAGE XR) 500 MG extended release tablet Take 1 tablet by mouth 2 times daily (before meals) 60 tablet 6    omeprazole (PRILOSEC) 20 MG delayed release capsule Take 1 capsule by mouth daily 30 capsule 6    Continuous Blood Gluc Sensor (FREESTYLE AMANDA 14 DAY SENSOR) MISC 2 Devices by Does not apply route every 14 days 2 each 6    Lancets MISC 1 each by Does not apply route 4 times daily (before meals and nightly) 150 each 3    tadalafil (CIALIS) 20 MG tablet Take 1 tablet by mouth daily as needed for Erectile Dysfunction 30 tablet 11    FREESTYLE LITE strip TEST TWICE A DAY AS DIRECTED 100 strip 1    Blood Glucose Monitoring Suppl (FREESTYLE LITE) ABIGAIL 1 Device by Does not apply route daily Dx: 250.00, non-insulin dependent 1 Device prn    Multiple Vitamin (MULTI-VITAMIN) TABS Take 1 tablet by mouth daily. Allergies:  No Known Allergies    Problem List:    Patient Active Problem List   Diagnosis Code    Type 2 diabetes mellitus with complication, without long-term current use of insulin ([facility] [address] E11.8    Obesity (BMI 30-39. 9) E66.9    Low HDL (under 40) E78.6    Erectile dysfunction associated with type 2 diabetes mellitus (HCC) E11.69, N52.1    Diverticulosis of large intestine K57.30    Albuminuria R80.9    Vitamin D deficiency E55.9    Gastroesophageal reflux disease without esophagitis K21.9    BPH without urinary obstruction N40.0       Past Medical History:        Diagnosis Date    Bunion of great toe of right foot     Controlled diabetes mellitus type 2 with complications ([facility] [address] 4716    Diabetic neuropathy associated with type 2 diabetes mellitus ([facility]     EMG Dr Chelsey Alejandre Erectile dysfunction     Dr Xiao Stack Folic acid deficiency 7276    Low HDL (under 40)     Non-compliance with treatment 2015    Obesity (BMI 30-39. 9)        Past Surgical History:        Procedure Laterality Date    ABDOMEN SURGERY  2009    for diverticulitis    ABDOMINAL HERNIA REPAIR  2010       Social History:    Social History     Tobacco Use    Smoking status: Never Smoker    Smokeless tobacco: Never Used   Substance Use Topics    Alcohol use: Yes     Alcohol/week: 0.0 standard drinks                                Counseling given: Not Answered      Vital Signs (Current): There were no vitals filed for this visit.                                            BP Readings from Last 3 Encounters:   08/06/20 124/72   07/14/20 117/77   03/17/20 (!) 142/78       NPO Status:                                                                                 BMI:   Wt Readings from Last 3 Encounters:   08/06/20 250 lb 9.6 oz (113.7 kg)   07/14/20 250 lb (113.4 kg)   03/17/20 269 lb (122 kg)     There is no height or weight on file to calculate BMI.    CBC:   Lab Results   Component Value Date    WBC 6.7 09/17/2019    RBC 4.86 09/17/2019    HGB 14.8 09/17/2019    HCT 42.3 09/17/2019    MCV 87.1 09/17/2019    RDW 13.5 09/17/2019     09/17/2019       CMP:   Lab Results   Component Value Date     03/14/2020    K 5.0 03/14/2020     03/14/2020    CO2 23 03/14/2020    BUN 12 03/14/2020    CREATININE 0.59 03/14/2020    GFRAA >60.0 03/14/2020    LABGLOM >60.0 03/14/2020    GLUCOSE 186 07/14/2020    PROT 7.3 03/14/2020    CALCIUM 9.4 03/14/2020    BILITOT 0.4 03/14/2020    ALKPHOS 132 03/14/2020    AST 24 03/14/2020    ALT 46 03/14/2020       POC Tests: No results for input(s): POCGLU, POCNA, POCK, POCCL, POCBUN, POCHEMO, POCHCT in the last 72 hours. Coags: No results found for: PROTIME, INR, APTT    HCG (If Applicable): No results found for: PREGTESTUR, PREGSERUM, HCG, HCGQUANT     ABGs: No results found for: PHART, PO2ART, JDV9IRR, JHJ2AIN, BEART, P0RHROMZ     Type & Screen (If Applicable):  No results found for: LABABO, LABRH    Drug/Infectious Status (If Applicable):  No results found for: HIV, HEPCAB    COVID-19 Screening (If Applicable):   Lab Results   Component Value Date    COVID19 Not Detected 08/25/2020         Anesthesia Evaluation    Airway: Mallampati: II  TM distance: >3 FB   Neck ROM: full  Mouth opening: > = 3 FB Dental:          Pulmonary:Negative Pulmonary ROS and normal exam                               Cardiovascular:    (+) hypertension:,         Rhythm: regular  Rate: normal                    Neuro/Psych:   Negative Neuro/Psych ROS              GI/Hepatic/Renal:   (+) GERD:, bowel prep,           Endo/Other:    (+) Diabetes, . Abdominal:   (+) obese,         Vascular: negative vascular ROS. Anesthesia Plan      MAC     ASA 3       Induction: intravenous. Anesthetic plan and risks discussed with patient. Plan discussed with attending.                   SUZETTE Hollingsworth - CRNA   8/31/2020

## 2020-09-01 ENCOUNTER — ANCILLARY PROCEDURE (OUTPATIENT)
Dept: ENDOSCOPY | Age: 50
End: 2020-09-01
Attending: SPECIALIST
Payer: COMMERCIAL

## 2020-09-01 ENCOUNTER — ANESTHESIA (OUTPATIENT)
Dept: ENDOSCOPY | Age: 50
End: 2020-09-01
Payer: COMMERCIAL

## 2020-09-01 ENCOUNTER — HOSPITAL ENCOUNTER (OUTPATIENT)
Age: 50
Setting detail: OUTPATIENT SURGERY
Discharge: HOME | End: 2020-09-01
Attending: SPECIALIST | Admitting: SPECIALIST
Payer: COMMERCIAL

## 2020-09-01 VITALS
OXYGEN SATURATION: 97 % | RESPIRATION RATE: 9 BRPM | SYSTOLIC BLOOD PRESSURE: 105 MMHG | DIASTOLIC BLOOD PRESSURE: 65 MMHG

## 2020-09-01 VITALS
RESPIRATION RATE: 16 BRPM | WEIGHT: 243 LBS | HEIGHT: 74 IN | DIASTOLIC BLOOD PRESSURE: 74 MMHG | TEMPERATURE: 97.6 F | OXYGEN SATURATION: 95 % | HEART RATE: 70 BPM | BODY MASS INDEX: 31.18 KG/M2 | SYSTOLIC BLOOD PRESSURE: 115 MMHG

## 2020-09-01 LAB
GLUCOSE BLD-MCNC: 163 MG/DL (ref 60–115)
PERFORMED ON: ABNORMAL

## 2020-09-01 PROCEDURE — 7100000010 HC PHASE II RECOVERY - FIRST 15 MIN: Performed by: SPECIALIST

## 2020-09-01 PROCEDURE — 3609027000 HC COLONOSCOPY: Performed by: SPECIALIST

## 2020-09-01 PROCEDURE — 3700000001 HC ADD 15 MINUTES (ANESTHESIA): Performed by: SPECIALIST

## 2020-09-01 PROCEDURE — 7100000011 HC PHASE II RECOVERY - ADDTL 15 MIN: Performed by: SPECIALIST

## 2020-09-01 PROCEDURE — 2709999900 HC NON-CHARGEABLE SUPPLY: Performed by: SPECIALIST

## 2020-09-01 PROCEDURE — 6370000000 HC RX 637 (ALT 250 FOR IP): Performed by: SPECIALIST

## 2020-09-01 PROCEDURE — 6360000002 HC RX W HCPCS: Performed by: NURSE ANESTHETIST, CERTIFIED REGISTERED

## 2020-09-01 PROCEDURE — 2580000003 HC RX 258: Performed by: SPECIALIST

## 2020-09-01 PROCEDURE — 2580000003 HC RX 258

## 2020-09-01 PROCEDURE — 3700000000 HC ANESTHESIA ATTENDED CARE: Performed by: SPECIALIST

## 2020-09-01 PROCEDURE — 2500000003 HC RX 250 WO HCPCS: Performed by: NURSE ANESTHETIST, CERTIFIED REGISTERED

## 2020-09-01 PROCEDURE — 45378 DIAGNOSTIC COLONOSCOPY: CPT | Performed by: SPECIALIST

## 2020-09-01 RX ORDER — MAGNESIUM HYDROXIDE 1200 MG/15ML
LIQUID ORAL CONTINUOUS PRN
Status: COMPLETED | OUTPATIENT
Start: 2020-09-01 | End: 2020-09-01

## 2020-09-01 RX ORDER — MAGNESIUM HYDROXIDE 1200 MG/15ML
LIQUID ORAL PRN
Status: DISCONTINUED | OUTPATIENT
Start: 2020-09-01 | End: 2020-09-01 | Stop reason: ALTCHOICE

## 2020-09-01 RX ORDER — SIMETHICONE 20 MG/.3ML
EMULSION ORAL PRN
Status: DISCONTINUED | OUTPATIENT
Start: 2020-09-01 | End: 2020-09-01 | Stop reason: ALTCHOICE

## 2020-09-01 RX ORDER — SODIUM CHLORIDE 9 MG/ML
INJECTION, SOLUTION INTRAVENOUS
Status: COMPLETED
Start: 2020-09-01 | End: 2020-09-01

## 2020-09-01 RX ORDER — 0.9 % SODIUM CHLORIDE 0.9 %
500 INTRAVENOUS SOLUTION INTRAVENOUS ONCE
Status: COMPLETED | OUTPATIENT
Start: 2020-09-01 | End: 2020-09-01

## 2020-09-01 RX ORDER — PROPOFOL 10 MG/ML
INJECTION, EMULSION INTRAVENOUS PRN
Status: DISCONTINUED | OUTPATIENT
Start: 2020-09-01 | End: 2020-09-01 | Stop reason: SDUPTHER

## 2020-09-01 RX ORDER — SIMETHICONE 20 MG/.3ML
40 EMULSION ORAL EVERY 6 HOURS PRN
Status: DISCONTINUED | OUTPATIENT
Start: 2020-09-01 | End: 2020-09-01 | Stop reason: HOSPADM

## 2020-09-01 RX ORDER — LIDOCAINE HYDROCHLORIDE 20 MG/ML
INJECTION, SOLUTION INFILTRATION; PERINEURAL PRN
Status: DISCONTINUED | OUTPATIENT
Start: 2020-09-01 | End: 2020-09-01 | Stop reason: SDUPTHER

## 2020-09-01 RX ADMIN — SODIUM CHLORIDE 500 ML: 9 INJECTION, SOLUTION INTRAVENOUS at 07:13

## 2020-09-01 RX ADMIN — Medication 500 ML: at 07:13

## 2020-09-01 RX ADMIN — LIDOCAINE HYDROCHLORIDE 60 MG: 20 INJECTION, SOLUTION INFILTRATION; PERINEURAL at 07:32

## 2020-09-01 RX ADMIN — PROPOFOL 400 MG: 10 INJECTION, EMULSION INTRAVENOUS at 07:32

## 2020-09-01 ASSESSMENT — PAIN - FUNCTIONAL ASSESSMENT: PAIN_FUNCTIONAL_ASSESSMENT: 0-10

## 2020-09-01 NOTE — H&P
Patient Name: Timo Spence  : 1970  MRN: [id number]  DATE: 20      ENDOSCOPY  History and Physical    Procedure:    [] Diagnostic Colonoscopy       [x] Screening Colonoscopy  [] EGD      [] ERCP      [] EUS       [] Other    [x] Previous office notes/History and Physical reviewed from the patients chart. Please see EMR for further details of HPI. I have examined the patient's status immediately prior to the procedure and:      Indications/HPI:    []Abdominal Pain  []Cancer- GI/Lung  []Fhx of colon CA/polyps  []History of Polyps  []Enriquezs   []Melena  []Abnormal Imaging  []Dysphagia    []Persistent Pneumonia  []Anemia  []Food Impaction  []History of Polyps  []GI Bleed  []Pulmonary nodule/Mass  []Change in bowel habits []Heartburn/Reflux  []Rectal Bleed (BRBPR)  []Chest Pain - Non Cardiac []Heme (+) Stoo  l[]Ulcers  []Constipation  []Hemoptysis   []Varices  []Diarrhea  []Hypoxemia  []Nausea/Vomiting  []Screening   []Crohns/Colitis  []Other:     Anesthesia:   [x] MAC [] Moderate Sedation   [] General   [] None     ROS: 12 pt Review of Symptoms was negative unless mentioned above    Medications:   Prior to Admission medications    Medication Sig Start Date End Date Taking?  Authorizing Provider   folic acid (FOLVITE) 1 MG tablet TAKE ONE TABLET BY MOUTH EVERY DAY 20  Yes Codie Andrade PA-C   lisinopril (PRINIVIL;ZESTRIL) 5 MG tablet Take 1 tablet by mouth daily 20  Yes ESTEFANY Corcoran   pravastatin (PRAVACHOL) 10 MG tablet TAKE ONE TABLET BY MOUTH EVERY DAY IN THE EVENING 20  Yes ESTEFANY Corcoran   Semaglutide, 1 MG/DOSE, (OZEMPIC, 1 MG/DOSE,) 2 MG/1.5ML SOPN Inject 1 mg into the skin once a week 20  Yes ESTEFANY Corcoran   metFORMIN (GLUCOPHAGE XR) 500 MG extended release tablet Take 1 tablet by mouth 2 times daily (before meals) 20  Yes Roseanna Gayle MD   omeprazole (PRILOSEC) 20 MG delayed release capsule Take 1 capsule by mouth daily 20  Yes Roseanna Gayle MD   Multiple Vitamin (MULTI-VITAMIN) TABS Take 1 tablet by mouth daily. Yes Historical Provider, MD   Continuous Blood Gluc  (FREESTYLE AMANDA 14 DAY READER) ABIGAIL USE AS DIRECTED BEFORE MEALS AND NIGHTLY 5/28/20   Raimundo Singh MD   Continuous Blood Gluc Sensor (FREESTYLE AMANDA 14 DAY SENSOR) MISC 2 Devices by Does not apply route every 14 days 4/14/20   Raimundo Singh MD   Lancets MISC 1 each by Does not apply route 4 times daily (before meals and nightly) 3/17/20   Raimundo Singh MD   tadalafil (CIALIS) 20 MG tablet Take 1 tablet by mouth daily as needed for Erectile Dysfunction 9/9/19   Roxy Sandhu MD   FREESTYLE LITE strip TEST TWICE A DAY AS DIRECTED 5/15/17   Jeannie Mejia MD   Blood Glucose Monitoring Suppl (FREESTYLE LITE) ABIGAIL 1 Device by Does not apply route daily Dx: 250.00, non-insulin dependent 10/6/16   Jeannie Mejia MD       Allergies: No Known Allergies     History of allergic reaction to anesthesia:  No    Past Medical History:  Past Medical History:   Diagnosis Date    BPH without urinary obstruction     Bunion of great toe of right foot     Controlled diabetes mellitus type 2 with complications ([facility] [address] 2103    Diabetic neuropathy associated with type 2 diabetes mellitus (HCC)     EMG Dr Violeta Sanz Diverticulitis     Erectile dysfunction     Dr Noe Duke     Folic acid deficiency 2246    GERD (gastroesophageal reflux disease)     Low HDL (under 40)     Non-compliance with treatment 2015    Obesity (BMI 30-39. 9)        Past Surgical History:  Past Surgical History:   Procedure Laterality Date    ABDOMEN SURGERY  2009    for diverticulitis    ABDOMINAL HERNIA REPAIR  2010    COLON SURGERY  2008    COLONOSCOPY      ENDOSCOPY, COLON, DIAGNOSTIC      THUMB AMPUTATION Right     due to accident       Social History:  Social History     Tobacco Use    Smoking status: Never Smoker    Smokeless tobacco: Never Used   Substance Use Topics    Alcohol use:  Yes     Alcohol/week: 0.0 standard

## 2020-10-12 RX ORDER — TADALAFIL 20 MG/1
TABLET ORAL
Qty: 30 TABLET | Refills: 11 | Status: SHIPPED | OUTPATIENT
Start: 2020-10-12 | End: 2022-04-26

## 2020-10-12 RX ORDER — LISINOPRIL 5 MG/1
5 TABLET ORAL DAILY
Qty: 90 TABLET | Refills: 5 | Status: SHIPPED
Start: 2020-10-12 | End: 2020-10-23 | Stop reason: SDUPTHER

## 2020-10-21 ENCOUNTER — OFFICE VISIT (OUTPATIENT)
Dept: ENDOCRINOLOGY | Age: 50
End: 2020-10-21
Payer: COMMERCIAL

## 2020-10-21 VITALS
HEART RATE: 75 BPM | DIASTOLIC BLOOD PRESSURE: 78 MMHG | WEIGHT: 245 LBS | HEIGHT: 74 IN | BODY MASS INDEX: 31.44 KG/M2 | SYSTOLIC BLOOD PRESSURE: 125 MMHG

## 2020-10-21 LAB
CHP ED QC CHECK: NORMAL
GLUCOSE BLD-MCNC: 226 MG/DL
HBA1C MFR BLD: 6.9 %

## 2020-10-21 PROCEDURE — 82962 GLUCOSE BLOOD TEST: CPT | Performed by: PHYSICIAN ASSISTANT

## 2020-10-21 PROCEDURE — 99214 OFFICE O/P EST MOD 30 MIN: CPT | Performed by: PHYSICIAN ASSISTANT

## 2020-10-21 PROCEDURE — 83036 HEMOGLOBIN GLYCOSYLATED A1C: CPT | Performed by: PHYSICIAN ASSISTANT

## 2020-10-21 RX ORDER — OMEPRAZOLE 20 MG/1
20 CAPSULE, DELAYED RELEASE ORAL DAILY
Qty: 30 CAPSULE | Refills: 6 | Status: SHIPPED | OUTPATIENT
Start: 2020-10-21 | End: 2021-10-25

## 2020-10-21 RX ORDER — FLUCONAZOLE 100 MG/1
100 TABLET ORAL DAILY PRN
Qty: 3 TABLET | Refills: 0 | Status: SHIPPED | OUTPATIENT
Start: 2020-10-21 | End: 2021-04-01

## 2020-10-21 ASSESSMENT — ENCOUNTER SYMPTOMS
RHINORRHEA: 0
SINUS PRESSURE: 0
DIARRHEA: 0
EYE PAIN: 0
WHEEZING: 0
ABDOMINAL PAIN: 0
NAUSEA: 0
SORE THROAT: 0
SHORTNESS OF BREATH: 0
EYE REDNESS: 0
BLURRED VISION: 1
VOMITING: 0
COUGH: 0

## 2020-10-21 NOTE — PROGRESS NOTES
(110.2 kg)   08/06/20 250 lb 9.6 oz (113.7 kg)     BP Readings from Last 3 Encounters:   10/21/20 125/78   09/01/20 105/65   09/01/20 115/74     Joshua Rea is a 80-year-old  type II diabetic male with glucose levels increasing. He has put on 20 pounds in the last 3 months. He is going to make dietary changes and will start him on a GLP-1    Diabetes   He presents for his initial diabetic visit. He has type 2 diabetes mellitus. No MedicAlert identification noted. The initial diagnosis of diabetes was made 20 years ago. His disease course has been worsening. There are no hypoglycemic associated symptoms. Pertinent negatives for hypoglycemia include no dizziness, headaches or nervousness/anxiousness. Associated symptoms include blurred vision. Pertinent negatives for diabetes include no chest pain, no fatigue, no polydipsia and no polyuria. There are no hypoglycemic complications. Symptoms are stable. There are no diabetic complications. Risk factors for coronary artery disease include diabetes mellitus, male sex, obesity and hypertension. Current diabetic treatment includes oral agent (dual therapy). He is compliant with treatment all of the time. He is following a generally unhealthy diet. Meal planning includes avoidance of concentrated sweets. He has had a previous visit with a dietitian. He participates in exercise intermittently. He monitors blood glucose at home 3-4 x per day. Blood glucose monitoring compliance is excellent. An ACE inhibitor/angiotensin II receptor blocker is being taken. He does not see a podiatrist.Eye exam is current.      Past Medical History:   Diagnosis Date    BPH without urinary obstruction     Bunion of great toe of right foot     Controlled diabetes mellitus type 2 with complications ([address] 9346    Diabetic neuropathy associated with type 2 diabetes mellitus ([address]     EMG Dr Ella Lizama Diverticulitis     Erectile dysfunction     Dr Taylor Minus     Folic acid deficiency 1146    Born in [facility], father from [place], one sister and one brother    , spouse works in a [employer], office job, [employer] at [place] [address], 3 daughters and 2 sons    Lives in a house in [place], with spouse and 4 children    Hobbies: Mandaeism, community service, music (keyboard)             Family History   Problem Relation Age of Onset    Diabetes Father     Hypertension Mother     Colon Cancer Neg Hx      No Known Allergies    Current Outpatient Medications:     omeprazole (PRILOSEC) 20 MG delayed release capsule, Take 1 capsule by mouth daily, Disp: 30 capsule, Rfl: 6    dapagliflozin (FARXIGA) 10 MG tablet, Take 1 tablet by mouth daily May substitute for generic or covered medication, Disp: 30 tablet, Rfl: 03    fluconazole (DIFLUCAN) 100 MG tablet, Take 1 tablet by mouth daily as needed (yeast infection), Disp: 3 tablet, Rfl: 0    lisinopril (PRINIVIL;ZESTRIL) 5 MG tablet, Take 1 tablet by mouth daily, Disp: 90 tablet, Rfl: 5    tadalafil (CIALIS) 20 MG tablet, TAKE 1 TABLET BY MOUTH ONE TIME A DAY AS NEEDED FOR ERECTILE DYSFUNCTION, Disp: 30 tablet, Rfl: 11    folic acid (FOLVITE) 1 MG tablet, TAKE ONE TABLET BY MOUTH EVERY DAY, Disp: 90 tablet, Rfl: 0    pravastatin (PRAVACHOL) 10 MG tablet, TAKE ONE TABLET BY MOUTH EVERY DAY IN THE EVENING, Disp: 30 tablet, Rfl: 3    Semaglutide, 1 MG/DOSE, (OZEMPIC, 1 MG/DOSE,) 2 MG/1.5ML SOPN, Inject 1 mg into the skin once a week, Disp: 9 pen, Rfl: 3    Continuous Blood Gluc  (FREESTYLE AMANDA 14 DAY READER) ABIGAIL, USE AS DIRECTED BEFORE MEALS AND NIGHTLY, Disp: 1 Device, Rfl: 0    metFORMIN (GLUCOPHAGE XR) 500 MG extended release tablet, Take 1 tablet by mouth 2 times daily (before meals), Disp: 60 tablet, Rfl: 6    Continuous Blood Gluc Sensor (FREESTYLE AMANDA 14 DAY SENSOR) MISC, 2 Devices by Does not apply route every 14 days, Disp: 2 each, Rfl: 6    Lancets MISC, 1 each by Does not apply route 4 times daily (before meals and nightly), Disp: 150 each, Rfl: 3    FREESTYLE LITE strip, TEST TWICE A DAY AS DIRECTED, Disp: 100 strip, Rfl: 1    Blood Glucose Monitoring Suppl (FREESTYLE LITE) ABIGAIL, 1 Device by Does not apply route daily Dx: 250.00, non-insulin dependent, Disp: 1 Device, Rfl: prn    Multiple Vitamin (MULTI-VITAMIN) TABS, Take 1 tablet by mouth daily. , Disp: , Rfl:   Lab Results   Component Value Date     03/14/2020    K 5.0 (H) 03/14/2020     03/14/2020    CO2 23 03/14/2020    BUN 12 03/14/2020    CREATININE 0.59 (L) 03/14/2020    GLUCOSE 226 10/21/2020    CALCIUM 9.4 03/14/2020    PROT 7.3 03/14/2020    LABALBU 4.7 (H) 03/14/2020    BILITOT 0.4 03/14/2020    ALKPHOS 132 (H) 03/14/2020    AST 24 03/14/2020    ALT 46 (H) 03/14/2020    LABGLOM >60.0 03/14/2020    GFRAA >60.0 03/14/2020    GLOB 2.6 03/14/2020     Lab Results   Component Value Date    WBC 6.7 09/17/2019    HGB 14.8 09/17/2019    HCT 42.3 09/17/2019    MCV 87.1 09/17/2019     09/17/2019     Lab Results   Component Value Date    LABA1C 6.9 10/21/2020    LABA1C 5.7 07/14/2020    LABA1C 8.3 (H) 03/14/2020     Lab Results   Component Value Date    CHOL 75 03/14/2020    CHOL 89 07/18/2018     Lab Results   Component Value Date    TRIG 89 03/14/2020    TRIG 65 07/18/2018     Lab Results   Component Value Date    HDL 32 (L) 03/14/2020    HDL 33 (L) 07/18/2018     Lab Results   Component Value Date    LDLCALC 25 03/14/2020    LDLCALC 43 07/18/2018     No results found for: LABVLDL, VLDL  No results found for: CHOLHDLRATIO  No results found for: TESTM  Lab Results   Component Value Date    TSH 1.150 09/17/2019       Review of Systems   Constitutional: Negative for chills, fatigue and fever. HENT: Negative for congestion, ear pain, postnasal drip, rhinorrhea, sinus pressure and sore throat. Eyes: Positive for blurred vision. Negative for pain and redness. Respiratory: Negative for cough, shortness of breath and wheezing. Cardiovascular: Negative for chest pain, palpitations and leg swelling. Gastrointestinal: Negative for abdominal pain, diarrhea, nausea and vomiting. Endocrine: Negative for cold intolerance, heat intolerance, polydipsia and polyuria. Genitourinary: Negative for difficulty urinating. Musculoskeletal: Negative for arthralgias. Skin: Negative for rash. Allergic/Immunologic: Negative for environmental allergies. Neurological: Negative for dizziness and headaches. Hematological: Negative for adenopathy. Psychiatric/Behavioral: Negative for dysphoric mood. The patient is not nervous/anxious. Objective:   Physical Exam  Vitals signs reviewed. Constitutional:       Appearance: He is well-developed. HENT:      Head: Normocephalic and atraumatic. Nose: No rhinorrhea. Mouth/Throat:      Mouth: Mucous membranes are moist.   Eyes:      Conjunctiva/sclera: Conjunctivae normal.   Neck:      Musculoskeletal: Normal range of motion and neck supple. Cardiovascular:      Rate and Rhythm: Normal rate and regular rhythm. Heart sounds: Normal heart sounds. Pulmonary:      Effort: Pulmonary effort is normal. No respiratory distress. Breath sounds: Normal breath sounds. Abdominal:      General: Bowel sounds are normal.      Palpations: Abdomen is soft. Musculoskeletal: Normal range of motion. General: No swelling. Comments: 2+ DP/PT pulses bilaterally. No wounds, lesions, or ulcerations. Thickened toenails. Mild nonpitting edema. Skin:     General: Skin is warm and dry. Neurological:      Mental Status: He is alert and oriented to person, place, and time.    Psychiatric:         Mood and Affect: Mood normal.

## 2020-11-19 RX ORDER — METFORMIN HYDROCHLORIDE 500 MG/1
TABLET, EXTENDED RELEASE ORAL
Qty: 60 TABLET | Refills: 6 | Status: SHIPPED | OUTPATIENT
Start: 2020-11-19 | End: 2021-03-18

## 2020-11-27 RX ORDER — PRAVASTATIN SODIUM 10 MG
TABLET ORAL
Qty: 30 TABLET | Refills: 3 | Status: SHIPPED | OUTPATIENT
Start: 2020-11-27 | End: 2021-04-12

## 2020-12-04 RX ORDER — FOLIC ACID 1 MG/1
TABLET ORAL
Qty: 90 TABLET | Refills: 4 | Status: SHIPPED | OUTPATIENT
Start: 2020-12-04 | End: 2022-01-10

## 2020-12-04 NOTE — TELEPHONE ENCOUNTER
Rx request   Requested Prescriptions     Pending Prescriptions Disp Refills    folic acid (FOLVITE) 1 MG tablet 90 tablet 0     LOV 8/6/2020  Next Visit Date:  Future Appointments   Date Time Provider Julissa Linnea   1/20/2021  9:20 AM Aline Hendricks, [address] [place] [address]   2/8/2021  9:15 AM VIKRAM Gamez Ala   3/12/2021  9:00 AM Bernard Pacheco MD [facility]

## 2021-01-05 RX ORDER — FLASH GLUCOSE SENSOR
2 KIT MISCELLANEOUS
Qty: 2 EACH | Refills: 6 | Status: SHIPPED | OUTPATIENT
Start: 2021-01-05 | End: 2021-06-21

## 2021-01-20 ENCOUNTER — OFFICE VISIT (OUTPATIENT)
Dept: ENDOCRINOLOGY | Age: 51
End: 2021-01-20
Payer: COMMERCIAL

## 2021-01-20 VITALS
SYSTOLIC BLOOD PRESSURE: 116 MMHG | HEIGHT: 74 IN | DIASTOLIC BLOOD PRESSURE: 77 MMHG | WEIGHT: 231 LBS | BODY MASS INDEX: 29.65 KG/M2 | HEART RATE: 83 BPM

## 2021-01-20 DIAGNOSIS — E11.8 TYPE 2 DIABETES MELLITUS WITH COMPLICATION, WITHOUT LONG-TERM CURRENT USE OF INSULIN (HCC): Primary | ICD-10-CM

## 2021-01-20 LAB
CHP ED QC CHECK: NORMAL
GLUCOSE BLD-MCNC: 118 MG/DL
HBA1C MFR BLD: 5.4 %

## 2021-01-20 PROCEDURE — 99213 OFFICE O/P EST LOW 20 MIN: CPT | Performed by: PHYSICIAN ASSISTANT

## 2021-01-20 PROCEDURE — 83036 HEMOGLOBIN GLYCOSYLATED A1C: CPT | Performed by: PHYSICIAN ASSISTANT

## 2021-01-20 PROCEDURE — 82962 GLUCOSE BLOOD TEST: CPT | Performed by: PHYSICIAN ASSISTANT

## 2021-01-20 ASSESSMENT — ENCOUNTER SYMPTOMS
NAUSEA: 0
BLURRED VISION: 1
EYE REDNESS: 0
COUGH: 0
EYE PAIN: 0
SORE THROAT: 0
DIARRHEA: 0
SHORTNESS OF BREATH: 0
WHEEZING: 0
VOMITING: 0
SINUS PRESSURE: 0
RHINORRHEA: 0
ABDOMINAL PAIN: 0

## 2021-01-20 NOTE — PROGRESS NOTES
Assessment:       Diagnosis Orders   1. Type 2 diabetes mellitus with complication, without long-term current use of insulin (HCC)  POCT Glucose    POCT glycosylated hemoglobin (Hb A1C)    dapagliflozin (FARXIGA) 10 MG tablet    Hemoglobin A1C    Comprehensive Metabolic Panel     No history of Pancreatitis  Pt is uncircumcised, want to consider SGLT2  PLAN:     1. Stop Metformin 500 mg twice daily  2. Continue Farxiga 10 mg daily   3. Continue Omeprazole 20 mg daily   4. Continue Ozempic 1.0 weekly   5. Follow up in 3 months     Orders Placed This Encounter   Procedures    Hemoglobin A1C     Standing Status:   Future     Standing Expiration Date:   1/20/2022    Comprehensive Metabolic Panel     Standing Status:   Future     Standing Expiration Date:   1/20/2022    POCT Glucose    POCT glycosylated hemoglobin (Hb A1C)     Orders Placed This Encounter   Medications    dapagliflozin (FARXIGA) 10 MG tablet     Sig: Take 1 tablet by mouth daily May substitute for generic or covered medication     Dispense:  90 tablet     Refill:  03     No follow-ups on file. Subjective:     Chief Complaint   Patient presents with    Diabetes      Vitals:    01/20/21 0904   BP: 116/77   Site: Right Upper Arm   Position: Sitting   Cuff Size: Large Adult   Pulse: 83   Weight: 231 lb (104.8 kg)   Height: 6' 2\" (1.88 m)     Wt Readings from Last 3 Encounters:   01/20/21 231 lb (104.8 kg)   10/21/20 245 lb (111.1 kg)   09/01/20 243 lb (110.2 kg)     BP Readings from Last 3 Encounters:   01/20/21 116/77   10/21/20 125/78   09/01/20 105/65     Linda Germain is a 43-year-old  type II diabetic male with glucose levels increasing. He has put on 20 pounds in the last 3 months. He is going to make dietary changes and will start him on a GLP-1    Diabetes  He presents for his initial diabetic visit. He has type 2 diabetes mellitus. No MedicAlert identification noted. The initial diagnosis of diabetes was made 20 years ago.  His disease course has been worsening. There are no hypoglycemic associated symptoms. Pertinent negatives for hypoglycemia include no dizziness or headaches. Associated symptoms include blurred vision. Pertinent negatives for diabetes include no chest pain, no fatigue, no polydipsia and no polyuria. There are no hypoglycemic complications. Symptoms are stable. There are no diabetic complications. Risk factors for coronary artery disease include diabetes mellitus, male sex, obesity and hypertension. Current diabetic treatment includes oral agent (dual therapy). He is compliant with treatment all of the time. He is following a generally unhealthy diet. Meal planning includes avoidance of concentrated sweets. He has had a previous visit with a dietitian. He participates in exercise intermittently. He monitors blood glucose at home 3-4 x per day. Blood glucose monitoring compliance is excellent. An ACE inhibitor/angiotensin II receptor blocker is being taken. He does not see a podiatrist.Eye exam is current. Past Medical History:   Diagnosis Date    BPH without urinary obstruction     Bunion of great toe of right foot     [facility]21    Diabetic neuropathy associated with type 2 diabetes mellitus (HCC)     EMG Dr Acacia Dickinson Diverticulitis     Erectile dysfunction     Dr Jessica Anglin     Folic acid deficiency 0899    GERD (gastroesophageal reflux disease)     Low HDL (under 40)     Non-compliance with treatment 2015    Obesity (BMI 30-39. 9)      Past Surgical History:   Procedure Laterality Date    ABDOMEN SURGERY  2009    for diverticulitis    ABDOMINAL HERNIA REPAIR  2010    COLON SURGERY  2008    COLONOSCOPY      COLONOSCOPY N/A 9/1/2020    COLORECTAL CANCER SCREENING, HIGH RISK performed by Pat Chou MD at [address] [facility], COLON, DIAGNOSTIC      THUMB AMPUTATION Right     due to accident     Social History     Socioeconomic History    Marital status:      Spouse name: Not on file    Number of children: 11    Years of education: Not on file    Highest education level: Not on file   Occupational History    Occupation: ,      Employer: [employer]   Social Needs    Financial resource strain: Not on file    Food insecurity     Worry: Not on file     Inability: Not on file   [employer] needs     Medical: Not on file     Non-medical: Not on file   Tobacco Use    Smoking status: Never Smoker    Smokeless tobacco: Never Used   Substance and Sexual Activity    Alcohol use:  Yes     Alcohol/week: 0.0 standard drinks     Comment: rare     Drug use: No    Sexual activity: Not on file   Lifestyle    Physical activity     Days per week: Not on file     Minutes per session: Not on file    Stress: Not on file   Relationships    Social connections     Talks on phone: Not on file     Gets together: Not on file     Attends Yazidi service: Not on file     Active member of club or organization: Not on file     Attends meetings of clubs or organizations: Not on file     Relationship status: Not on file    Intimate partner violence     Fear of current or ex partner: Not on file     Emotionally abused: Not on file     Physically abused: Not on file     Forced sexual activity: Not on file   Other Topics Concern    Not on file   Social History Narrative    Born in [facility], father from [place], one sister and one brother    , spouse works in a [employer], office job, [employer] at [employer], 3 daughters and 2 sons    Lives in a house in [place], with spouse and 4 children    Hobbies: Muslim, community service, music (keyboard)             Family History   Problem Relation Age of Onset    Diabetes Father     Hypertension Mother     Colon Cancer Neg Hx      No Known Allergies    Current Outpatient Medications:     dapagliflozin (FARXIGA) 10 MG tablet, Take 1 tablet by mouth daily May substitute for generic or covered medication, Disp: 90 tablet, Rfl: 03    Continuous Blood Gluc Sensor (FREESTYLE AMANDA 14 DAY SENSOR) MISC, 2 Devices by Does not apply route every 14 days, Disp: 2 each, Rfl: 6    folic acid (FOLVITE) 1 MG tablet, TAKE ONE TABLET BY MOUTH EVERY DAY, Disp: 90 tablet, Rfl: 4    pravastatin (PRAVACHOL) 10 MG tablet, TAKE ONE TABLET BY MOUTH EVERY DAY IN THE EVENING, Disp: 30 tablet, Rfl: 3    metFORMIN (GLUCOPHAGE-XR) 500 MG extended release tablet, TAKE ONE TABLET BY MOUTH TWICE A DAY WITH MEALS, Disp: 60 tablet, Rfl: 6    lisinopril (PRINIVIL;ZESTRIL) 10 MG tablet, TAKE ONE-HALF TABLET BY MOUTH EVERY DAY, Disp: 45 tablet, Rfl: 5    omeprazole (PRILOSEC) 20 MG delayed release capsule, Take 1 capsule by mouth daily, Disp: 30 capsule, Rfl: 6    tadalafil (CIALIS) 20 MG tablet, TAKE 1 TABLET BY MOUTH ONE TIME A DAY AS NEEDED FOR ERECTILE DYSFUNCTION, Disp: 30 tablet, Rfl: 11    Semaglutide, 1 MG/DOSE, (OZEMPIC, 1 MG/DOSE,) 2 MG/1.5ML SOPN, Inject 1 mg into the skin once a week, Disp: 9 pen, Rfl: 3    Continuous Blood Gluc  (FREESTYLE AMANDA 14 DAY READER) ABIGAIL, USE AS DIRECTED BEFORE MEALS AND NIGHTLY, Disp: 1 Device, Rfl: 0    Lancets MISC, 1 each by Does not apply route 4 times daily (before meals and nightly), Disp: 150 each, Rfl: 3    FREESTYLE LITE strip, TEST TWICE A DAY AS DIRECTED, Disp: 100 strip, Rfl: 1    Blood Glucose Monitoring Suppl (FREESTYLE LITE) ABIGAIL, 1 Device by Does not apply route daily Dx: 250.00, non-insulin dependent, Disp: 1 Device, Rfl: prn    Multiple Vitamin (MULTI-VITAMIN) TABS, Take 1 tablet by mouth daily.   , Disp: , Rfl:   Lab Results   Component Value Date     03/14/2020    K 5.0 (H) 03/14/2020     03/14/2020    CO2 23 03/14/2020    BUN 12 03/14/2020    CREATININE 0.59 (L) 03/14/2020    GLUCOSE 118 01/20/2021    CALCIUM 9.4 03/14/2020    PROT 7.3 03/14/2020    LABALBU 4.7 (H) 03/14/2020    BILITOT 0.4 03/14/2020    ALKPHOS 132 (H) 03/14/2020    AST 24 03/14/2020    ALT 46 (H) 03/14/2020    LABGLOM >60.0 03/14/2020    GFRAA >60.0 03/14/2020    GLOB 2.6 03/14/2020     Lab Results   Component Value Date    WBC 6.7 09/17/2019    HGB 14.8 09/17/2019    HCT 42.3 09/17/2019    MCV 87.1 09/17/2019     09/17/2019     Lab Results   Component Value Date    LABA1C 5.4 01/20/2021    LABA1C 6.9 10/21/2020    LABA1C 5.7 07/14/2020     Lab Results   Component Value Date    CHOL 75 03/14/2020    CHOL 89 07/18/2018     Lab Results   Component Value Date    TRIG 89 03/14/2020    TRIG 65 07/18/2018     Lab Results   Component Value Date    HDL 32 (L) 03/14/2020    HDL 33 (L) 07/18/2018     Lab Results   Component Value Date    LDLCALC 25 03/14/2020    LDLCALC 43 07/18/2018     No results found for: LABVLDL, VLDL  No results found for: CHOLHDLRATIO  No results found for: TESTM  Lab Results   Component Value Date    TSH 1.150 09/17/2019       Review of Systems   Constitutional: Negative for chills, fatigue and fever. HENT: Negative for congestion, ear pain, postnasal drip, rhinorrhea, sinus pressure and sore throat. Eyes: Positive for blurred vision. Negative for pain and redness. Respiratory: Negative for cough, shortness of breath and wheezing. Cardiovascular: Negative for chest pain, palpitations and leg swelling. Gastrointestinal: Negative for abdominal pain, diarrhea, nausea and vomiting. Endocrine: Negative for cold intolerance, heat intolerance, polydipsia and polyuria. Genitourinary: Negative for difficulty urinating. Musculoskeletal: Negative for arthralgias. Skin: Negative for rash. Allergic/Immunologic: Negative for food allergies. Neurological: Negative for dizziness and headaches. Hematological: Negative for adenopathy. Psychiatric/Behavioral: Negative for dysphoric mood. Objective:   Physical Exam  Vitals signs reviewed. Constitutional:       Appearance: He is well-developed. HENT:      Head: Normocephalic and atraumatic. Nose: No congestion or rhinorrhea. Mouth/Throat:      Mouth: Mucous membranes are moist.   Eyes:      Conjunctiva/sclera: Conjunctivae normal.   Neck:      Musculoskeletal: Normal range of motion and neck supple. Cardiovascular:      Rate and Rhythm: Normal rate and regular rhythm. Heart sounds: Normal heart sounds. Pulmonary:      Effort: Pulmonary effort is normal. No respiratory distress. Breath sounds: Normal breath sounds. Abdominal:      General: Bowel sounds are normal.      Palpations: Abdomen is soft. Musculoskeletal: Normal range of motion. General: No swelling. Skin:     General: Skin is warm and dry. Neurological:      Mental Status: He is alert and oriented to person, place, and time.    Psychiatric:         Mood and Affect: Mood normal.

## 2021-02-16 ENCOUNTER — HOSPITAL ENCOUNTER (OUTPATIENT)
Age: 51
Setting detail: SPECIMEN
Discharge: HOME | End: 2021-02-16
Payer: COMMERCIAL

## 2021-03-16 DIAGNOSIS — N40.0 BENIGN PROSTATIC HYPERPLASIA WITHOUT LOWER URINARY TRACT SYMPTOMS: Primary | ICD-10-CM

## 2021-03-16 DIAGNOSIS — E11.8 TYPE 2 DIABETES MELLITUS WITH COMPLICATION, WITHOUT LONG-TERM CURRENT USE OF INSULIN (HCC): ICD-10-CM

## 2021-03-16 DIAGNOSIS — N40.0 BENIGN PROSTATIC HYPERPLASIA WITHOUT LOWER URINARY TRACT SYMPTOMS: ICD-10-CM

## 2021-03-16 LAB
ALBUMIN SERPL-MCNC: 4.6 G/DL (ref 3.5–4.6)
ALP BLD-CCNC: 109 U/L (ref 35–104)
ALT SERPL-CCNC: 21 U/L (ref 0–41)
ANION GAP SERPL CALCULATED.3IONS-SCNC: 11 MEQ/L (ref 9–15)
AST SERPL-CCNC: 15 U/L (ref 0–40)
BILIRUB SERPL-MCNC: 0.5 MG/DL (ref 0.2–0.7)
BUN BLDV-MCNC: 20 MG/DL (ref 6–20)
CALCIUM SERPL-MCNC: 9.4 MG/DL (ref 8.5–9.9)
CHLORIDE BLD-SCNC: 105 MEQ/L (ref 95–107)
CO2: 25 MEQ/L (ref 20–31)
CREAT SERPL-MCNC: 0.63 MG/DL (ref 0.7–1.2)
CREATININE URINE: 97.3 MG/DL
GFR AFRICAN AMERICAN: >60
GFR NON-AFRICAN AMERICAN: >60
GLOBULIN: 3 G/DL (ref 2.3–3.5)
GLUCOSE BLD-MCNC: 181 MG/DL (ref 70–99)
HBA1C MFR BLD: 5.9 % (ref 4.8–5.9)
MICROALBUMIN UR-MCNC: 1.4 MG/DL
MICROALBUMIN/CREAT UR-RTO: 14.4 MG/G (ref 0–30)
POTASSIUM SERPL-SCNC: 4.7 MEQ/L (ref 3.4–4.9)
PROSTATE SPECIFIC ANTIGEN: 0.77 NG/ML (ref 0–3.89)
SODIUM BLD-SCNC: 141 MEQ/L (ref 135–144)
TOTAL PROTEIN: 7.6 G/DL (ref 6.3–8)

## 2021-03-18 ENCOUNTER — OFFICE VISIT (OUTPATIENT)
Dept: UROLOGY | Age: 51
End: 2021-03-18
Payer: COMMERCIAL

## 2021-03-18 VITALS
WEIGHT: 225 LBS | HEART RATE: 63 BPM | DIASTOLIC BLOOD PRESSURE: 68 MMHG | HEIGHT: 74 IN | BODY MASS INDEX: 28.88 KG/M2 | SYSTOLIC BLOOD PRESSURE: 126 MMHG

## 2021-03-18 DIAGNOSIS — N40.0 BENIGN PROSTATIC HYPERPLASIA WITHOUT LOWER URINARY TRACT SYMPTOMS: Primary | ICD-10-CM

## 2021-03-18 PROCEDURE — 99213 OFFICE O/P EST LOW 20 MIN: CPT | Performed by: UROLOGY

## 2021-03-18 RX ORDER — TADALAFIL 20 MG/1
20 TABLET ORAL DAILY
Qty: 30 TABLET | Refills: 11 | Status: SHIPPED | OUTPATIENT
Start: 2021-03-18 | End: 2022-04-26

## 2021-03-18 RX ORDER — LISINOPRIL 5 MG/1
TABLET ORAL
COMMUNITY
Start: 2021-01-17 | End: 2021-07-19

## 2021-03-18 NOTE — PROGRESS NOTES
[facility] EVALUATION NOTE                                                 H&P                                                                                                                                                 Reason for Visit  Erectile dysfunction    History of Present Illness  66-year-old male with history of erectile dysfunction responding well to Cialis therapy  Etiology of his erectile dysfunction is history of diabetes with neuropathy  Patient also gets yearly checkups for his prostate with a PSA  No change in voiding symptoms  Repeat PSA is normal      Urologic Review of Systems/Symptoms  No issues with urination    Review of Systems  Hospitalization: None recent  All 14 categories of Review of Systems otherwise reviewed no other findings reported. Past Medical History:   Diagnosis Date    BPH without urinary obstruction     Bunion of great toe of right foot     Controlled diabetes mellitus type 2 with complications ([facility] 2314    Diabetic neuropathy associated with type 2 diabetes mellitus (HCC)     EMG Dr Cee Freitas Diverticulitis     Erectile dysfunction     Dr Ana Lilia Chavez     Folic acid deficiency 8645    GERD (gastroesophageal reflux disease)     Low HDL (under 40)     Non-compliance with treatment 2015    Obesity (BMI 30-39. 9)      Past Surgical History:   Procedure Laterality Date    ABDOMEN SURGERY  2009    for diverticulitis    ABDOMINAL HERNIA REPAIR  2010    COLON SURGERY  2008    COLONOSCOPY      COLONOSCOPY N/A 9/1/2020    COLORECTAL CANCER SCREENING, HIGH RISK performed by Bailey Bynum MD at [address] [place] [address], DIAGNOSTIC      THUMB AMPUTATION Right     due to accident     Social History     Socioeconomic History    Marital status:      Spouse name: None    Number of children: 11    Years of education: None    Highest education level: None   Occupational History    Occupation: ,      Employer: [employer]   Social Needs    Financial resource strain: None    Food insecurity     Worry: None     Inability: None    Transportation needs     Medical: None     Non-medical: None   Tobacco Use    Smoking status: Never Smoker    Smokeless tobacco: Never Used   Substance and Sexual Activity    Alcohol use:  Yes     Alcohol/week: 0.0 standard drinks     Comment: rare     Drug use: No    Sexual activity: None   Lifestyle    Physical activity     Days per week: None     Minutes per session: None    Stress: None   Relationships    Social connections     Talks on phone: None     Gets together: None     Attends Congregational service: None     Active member of club or organization: None     Attends meetings of clubs or organizations: None     Relationship status: None    Intimate partner violence     Fear of current or ex partner: None     Emotionally abused: None     Physically abused: None     Forced sexual activity: None   Other Topics Concern    None   Social History Narrative    Born in [facility], father from [place], one sister and one brother    , spouse works in a [employer], office job, [employer] at [facility] 5, 3 daughters and 2 sons    Lives in a house in [place], with spouse and 4 children    Hobbies: Cheondoism, community service, music (keyboard)             Family History   Problem Relation Age of Onset    Diabetes Father     Hypertension Mother     Colon Cancer Neg Hx      Current Outpatient Medications   Medication Sig Dispense Refill    lisinopril (PRINIVIL;ZESTRIL) 5 MG tablet TAKE 1 TABLET BY MOUTH EVERY DAY      Nutritional Supplements (JOINT FORMULA PO) Take by mouth      tadalafil (CIALIS) 20 MG tablet Take 1 tablet by mouth daily 30 tablet 11    dapagliflozin (FARXIGA) 10 MG tablet Take 1 tablet by mouth daily May substitute for generic or covered medication 90 tablet 03    Continuous Blood Gluc Sensor ([employer] AMANDA 14 DAY SENSOR) MISC 2 Devices by Does not apply route every 14 days 2 each 6    folic acid (FOLVITE) 1 MG tablet TAKE ONE TABLET BY MOUTH EVERY DAY 90 tablet 4    pravastatin (PRAVACHOL) 10 MG tablet TAKE ONE TABLET BY MOUTH EVERY DAY IN THE EVENING 30 tablet 3    omeprazole (PRILOSEC) 20 MG delayed release capsule Take 1 capsule by mouth daily 30 capsule 6    tadalafil (CIALIS) 20 MG tablet TAKE 1 TABLET BY MOUTH ONE TIME A DAY AS NEEDED FOR ERECTILE DYSFUNCTION 30 tablet 11    Semaglutide, 1 MG/DOSE, (OZEMPIC, 1 MG/DOSE,) 2 MG/1.5ML SOPN Inject 1 mg into the skin once a week 9 pen 3    Continuous Blood Gluc  (FREESTYLE AMANDA 14 DAY READER) ABIGAIL USE AS DIRECTED BEFORE MEALS AND NIGHTLY 1 Device 0    Lancets MISC 1 each by Does not apply route 4 times daily (before meals and nightly) 150 each 3    FREESTYLE LITE strip TEST TWICE A DAY AS DIRECTED 100 strip 1    Blood Glucose Monitoring Suppl (FREESTYLE LITE) ABIGAIL 1 Device by Does not apply route daily Dx: 250.00, non-insulin dependent 1 Device prn    Multiple Vitamin (MULTI-VITAMIN) TABS Take 1 tablet by mouth daily. No current facility-administered medications for this visit. Patient has no known allergies. All reviewed and verified by Dr Chiara Ko on today's visit    PSA   Date Value Ref Range Status   03/16/2021 0.77 0.00 - 3.89 ng/mL Final   03/12/2020 0.53 0.00 - 3.89 ng/mL Final     No results found for this visit on 03/18/21. Physical Exam  Vitals:    03/18/21 0814   BP: 126/68   Pulse: 63   Weight: 225 lb (102.1 kg)   Height: 6' 2\" (1.88 m)     Constitutional: Normal.  Head and Neck: Normal  Cardiovascular: Normal rate, BP reviewed. Normal  Pulmonary/Chest: Normal respiratory effort not short of breath  Abdominal: Not distended.  Unchanged  Urologic Exam  Unchanged prostate exam.  Assessment/Medical Necessity-Decision Making  History of erectile dysfunction responding well to Cialis new prescription given  PSA stable at 0.77  Plan  PSA 1 year with follow-up for an exam  Greater than 50% of 20 minutes spent consulting patient face-to-face  Orders Placed This Encounter   Procedures    PSA, Diagnostic     Standing Status:   Future     Standing Expiration Date:   3/18/2022     Orders Placed This Encounter   Medications    tadalafil (CIALIS) 20 MG tablet     Sig: Take 1 tablet by mouth daily     Dispense:  30 tablet     Refill:  11     Alyssa Bird MD       Please note this report has been partially produced using speech recognition software  And may cause contain errors related to that system including grammar, punctuation and spelling as well as words and phrases that may seem inappropriate. If there are questions or concerns please feel free to contact me to clarify.

## 2021-04-01 RX ORDER — FLUCONAZOLE 100 MG/1
100 TABLET ORAL DAILY PRN
Qty: 3 TABLET | Refills: 0 | Status: SHIPPED | OUTPATIENT
Start: 2021-04-01 | End: 2021-04-04

## 2021-04-12 RX ORDER — PRAVASTATIN SODIUM 10 MG
TABLET ORAL
Qty: 30 TABLET | Refills: 3 | Status: SHIPPED | OUTPATIENT
Start: 2021-04-12 | End: 2021-08-20

## 2021-06-21 RX ORDER — FLASH GLUCOSE SENSOR
KIT MISCELLANEOUS
Qty: 4 EACH | Refills: 6 | Status: SHIPPED | OUTPATIENT
Start: 2021-06-21 | End: 2022-06-27

## 2021-07-19 RX ORDER — LISINOPRIL 5 MG/1
TABLET ORAL
Qty: 90 TABLET | Refills: 5 | Status: SHIPPED | OUTPATIENT
Start: 2021-07-19 | End: 2022-07-20

## 2021-07-26 RX ORDER — SEMAGLUTIDE 1.34 MG/ML
1 INJECTION, SOLUTION SUBCUTANEOUS WEEKLY
Qty: 3 PEN | Refills: 3 | Status: SHIPPED | OUTPATIENT
Start: 2021-07-26 | End: 2021-10-27 | Stop reason: SDUPTHER

## 2021-08-20 RX ORDER — PRAVASTATIN SODIUM 10 MG
TABLET ORAL
Qty: 30 TABLET | Refills: 3 | Status: SHIPPED | OUTPATIENT
Start: 2021-08-20 | End: 2022-01-19 | Stop reason: SDUPTHER

## 2021-10-24 DIAGNOSIS — E11.8 TYPE 2 DIABETES MELLITUS WITH COMPLICATION, WITHOUT LONG-TERM CURRENT USE OF INSULIN (HCC): ICD-10-CM

## 2021-10-25 DIAGNOSIS — E11.8 TYPE 2 DIABETES MELLITUS WITH COMPLICATION, WITHOUT LONG-TERM CURRENT USE OF INSULIN (HCC): ICD-10-CM

## 2021-10-25 RX ORDER — OMEPRAZOLE 20 MG/1
CAPSULE, DELAYED RELEASE ORAL
Qty: 90 CAPSULE | Refills: 2 | Status: SHIPPED | OUTPATIENT
Start: 2021-10-25 | End: 2022-07-20

## 2021-10-26 RX ORDER — DAPAGLIFLOZIN 10 MG/1
TABLET, FILM COATED ORAL
Qty: 90 TABLET | Refills: 0 | Status: SHIPPED | OUTPATIENT
Start: 2021-10-26 | End: 2022-01-06 | Stop reason: SDUPTHER

## 2021-10-26 NOTE — TELEPHONE ENCOUNTER
Rx request   Requested Prescriptions     Pending Prescriptions Disp Refills    Semaglutide, 1 MG/DOSE, (OZEMPIC, 1 MG/DOSE,) 2 MG/1.5ML SOPN 3 pen 3     Sig: Inject 1 mg into the skin once a week     LOV Visit date not found  Next Visit Date:  Future Appointments   Date Time Provider Julissa Yarbrough   3/22/2022  8:00 AM Lucille Stapleton MD [facility]

## 2021-10-27 RX ORDER — SEMAGLUTIDE 1.34 MG/ML
1 INJECTION, SOLUTION SUBCUTANEOUS WEEKLY
Qty: 3 PEN | Refills: 3 | Status: SHIPPED | OUTPATIENT
Start: 2021-10-27 | End: 2022-01-19 | Stop reason: ALTCHOICE

## 2022-01-06 DIAGNOSIS — E11.8 TYPE 2 DIABETES MELLITUS WITH COMPLICATION, WITHOUT LONG-TERM CURRENT USE OF INSULIN (HCC): ICD-10-CM

## 2022-01-07 ENCOUNTER — TELEPHONE (OUTPATIENT)
Dept: ENDOCRINOLOGY | Age: 52
End: 2022-01-07

## 2022-01-07 NOTE — TELEPHONE ENCOUNTER
Patient said that [facility] will not cover farxiga    Medication needs a PA patient said this medication works for patient.

## 2022-01-10 RX ORDER — PRAVASTATIN SODIUM 10 MG
TABLET ORAL
Qty: 30 TABLET | Refills: 3 | OUTPATIENT
Start: 2022-01-10

## 2022-01-19 ENCOUNTER — VIRTUAL VISIT (OUTPATIENT)
Dept: ENDOCRINOLOGY | Age: 52
End: 2022-01-19
Payer: COMMERCIAL

## 2022-01-19 DIAGNOSIS — E11.8 TYPE 2 DIABETES MELLITUS WITH COMPLICATION, WITHOUT LONG-TERM CURRENT USE OF INSULIN (HCC): Primary | ICD-10-CM

## 2022-01-19 PROCEDURE — 99442 PR PHYS/QHP TELEPHONE EVALUATION 11-20 MIN: CPT | Performed by: PHYSICIAN ASSISTANT

## 2022-01-19 RX ORDER — PRAVASTATIN SODIUM 10 MG
TABLET ORAL
Qty: 90 TABLET | Refills: 3 | Status: SHIPPED | OUTPATIENT
Start: 2022-01-19

## 2022-01-19 RX ORDER — SEMAGLUTIDE 1.34 MG/ML
1 INJECTION, SOLUTION SUBCUTANEOUS WEEKLY
Qty: 9 ML | Refills: 1 | Status: SHIPPED | OUTPATIENT
Start: 2022-01-19 | End: 2022-09-19 | Stop reason: SDUPTHER

## 2022-01-19 ASSESSMENT — ENCOUNTER SYMPTOMS
NAUSEA: 0
RHINORRHEA: 0
ABDOMINAL PAIN: 0
SHORTNESS OF BREATH: 0
EYE REDNESS: 0
EYE PAIN: 0
SORE THROAT: 0
WHEEZING: 0
VOMITING: 0
BLURRED VISION: 1
DIARRHEA: 0
SINUS PRESSURE: 0
COUGH: 0

## 2022-01-19 NOTE — PROGRESS NOTES
Emir Smalls was evaluated through a synchronous (real-time) audio-video encounter. The patient (or guardian if applicable) is aware that this is a billable service, which includes applicable co-pays. This Virtual Visit was conducted with patient's (and/or legal guardian's) consent. The visit was conducted pursuant to the emergency declaration under the [facility], [address] [place] [address] authority and the [employer] General Act. Patient identification was verified, and a caregiver was present when appropriate. The patient was located in a state where the provider was licensed to provide care. .vir  Assessment:       Diagnosis Orders   1. Type 2 diabetes mellitus with complication, without long-term current use of insulin (HCC)  Comprehensive Metabolic Panel    Lipid Panel    Hemoglobin A1C     No history of Pancreatitis  Pt is uncircumcised, want to consider SGLT2  PLAN:     1. Continue Farxiga 10 mg daily   2. Continue Omeprazole 20 mg daily   3. Continue Ozempic 1.0 weekly  4. Continue pravastatin 10 mg nightly    5. Labs on Friday this week (1/21/2022)  6. Repeat labs in 6 months  7. Follow up in 6 months     Orders Placed This Encounter   Procedures    Comprehensive Metabolic Panel     Standing Status:   Future     Standing Expiration Date:   1/19/2023    Lipid Panel     Standing Status:   Future     Standing Expiration Date:   1/19/2023     Order Specific Question:   Is Patient Fasting?/# of Hours     Answer:   8    Hemoglobin A1C     Standing Status:   Future     Standing Expiration Date:   1/19/2023     No orders of the defined types were placed in this encounter. No follow-ups on file. Subjective:     No chief complaint on file. There were no vitals filed for this visit.   Wt Readings from Last 3 Encounters:   03/18/21 225 lb (102.1 kg)   01/20/21 231 lb (104.8 kg)   10/21/20 245 lb (111.1 kg)     BP Readings from Last 3 Encounters:   03/18/21 126/68   01/20/21 116/77   10/21/20 125/78     Dionne Victoria is a 55-year-old  type II diabetic male with glucose levels increasing. He has put on 20 pounds in the last 3 months. He is going to make dietary changes and will start him on a GLP-1    Diabetes  He presents for his initial diabetic visit. He has type 2 diabetes mellitus. No MedicAlert identification noted. The initial diagnosis of diabetes was made 20 years ago. His disease course has been worsening. There are no hypoglycemic associated symptoms. Pertinent negatives for hypoglycemia include no dizziness or headaches. Associated symptoms include blurred vision. Pertinent negatives for diabetes include no chest pain, no fatigue, no polydipsia and no polyuria. There are no hypoglycemic complications. Symptoms are stable. There are no diabetic complications. Risk factors for coronary artery disease include diabetes mellitus, male sex, obesity and hypertension. Current diabetic treatment includes oral agent (dual therapy). He is compliant with treatment all of the time. He is following a generally unhealthy diet. Meal planning includes avoidance of concentrated sweets. He has had a previous visit with a dietitian. He participates in exercise intermittently. He monitors blood glucose at home 3-4 x per day. Blood glucose monitoring compliance is excellent. An ACE inhibitor/angiotensin II receptor blocker is being taken. He does not see a podiatrist.Eye exam is current.      Past Medical History:   Diagnosis Date    BPH without urinary obstruction     Bunion of great toe of right foot     Controlled diabetes mellitus type 2 with complications ([facility] [address] 2313    Diabetic neuropathy associated with type 2 diabetes mellitus (HCC)     EMG Dr Kim Ingram Diverticulitis     Erectile dysfunction     Dr Keenan Barroso     Folic acid deficiency 6452    GERD (gastroesophageal reflux disease)     Low HDL (under 40)     Non-compliance with treatment 2015    Obesity (BMI 30-39. 9)      Past Surgical History:   Procedure Laterality Date    ABDOMEN SURGERY  2009    for diverticulitis    ABDOMINAL HERNIA REPAIR  2010    COLON SURGERY  2008    COLONOSCOPY      COLONOSCOPY N/A 9/1/2020    COLORECTAL CANCER SCREENING, HIGH RISK performed by Jamal Ann MD at [address] [place] [address], DIAGNOSTIC      THUMB AMPUTATION Right     due to accident     Social History     Socioeconomic History    Marital status:      Spouse name: Not on file    Number of children: 11    Years of education: Not on file    Highest education level: Not on file   Occupational History    Occupation: ,      Employer: [employer]   Tobacco Use    Smoking status: Never Smoker    Smokeless tobacco: Never Used   Vaping Use    Vaping Use: Never used   Substance and Sexual Activity    Alcohol use: Yes     Alcohol/week: 0.0 standard drinks     Comment: rare     Drug use: No    Sexual activity: Not on file   Other Topics Concern    Not on file   Social History Narrative    Born in [place] [facility], father from [place], one sister and one brother    , spouse works in a [address] [place] [address] [employer], office job, [employer] at [employer], 3 daughters and 2 sons    Lives in a house in [place] [place] [facility], with spouse and 4 children    Hobbies: Gnosticist, community service, music (keyboard)             Social Determinants of [address] Strain:     Difficulty of Paying Living Expenses: Not on [address]:    [address] [facility] in the Last Year: Not on file    [address] in the Last Year: Not on file   Transportation Needs:     Lack of Transportation (Medical): Not on file    Lack of Transportation (Non-Medical):  Not on file   Physical Activity:     Days of Exercise per Week: Not on file    Minutes of Exercise per Session: Not on file   Stress:     Feeling of Stress : Not on file   Social Connections:     Frequency of Communication with Friends and Family: Not on file    Frequency of Social Gatherings with Friends and Family: Not on file    Attends Jewish Services: Not on file    Active Member of Clubs or Organizations: Not on file    Attends Club or Organization Meetings: Not on file    Marital Status: Not on file   Intimate Partner Violence:     Fear of Current or Ex-Partner: Not on file    Emotionally Abused: Not on file    Physically Abused: Not on file    Sexually Abused: Not on file   Housing Stability:     Unable to Pay for Housing in the Last Year: Not on file    Number of Places Lived in the Last Year: Not on file    Unstable Housing in the Last Year: Not on file     Family History   Problem Relation Age of Onset    Diabetes Father     Hypertension Mother     Colon Cancer Neg Hx      No Known Allergies    Current Outpatient Medications:     folic acid (FOLVITE) 1 MG tablet, TAKE 1 TABLET BY MOUTH EVERY DAY, Disp: 90 tablet, Rfl: 4    dapagliflozin (FARXIGA) 10 MG tablet, TAKE 1 TABLET BY MOUTH DAILY MAY SUBSTITUTE FOR GENERIC OR COVERED MEDICATION, Disp: 90 tablet, Rfl: 0    omeprazole (PRILOSEC) 20 MG delayed release capsule, TAKE 1 CAPSULE BY MOUTH EVERY DAY, Disp: 90 capsule, Rfl: 2    pravastatin (PRAVACHOL) 10 MG tablet, TAKE ONE TABLET BY MOUTH EVERY DAY IN THE EVENING, Disp: 30 tablet, Rfl: 3    lisinopril (PRINIVIL;ZESTRIL) 5 MG tablet, TAKE 1 TABLET BY MOUTH EVERY DAY, Disp: 90 tablet, Rfl: 5    Continuous Blood Gluc Sensor (FREESTYLE AMANDA 14 DAY SENSOR) [facility], USE AS DIRECTED EVERY 14 DAYS, Disp: 4 each, Rfl: 6    Nutritional Supplements (JOINT FORMULA PO), Take by mouth, Disp: , Rfl:     tadalafil (CIALIS) 20 MG tablet, Take 1 tablet by mouth daily, Disp: 30 tablet, Rfl: 11    tadalafil (CIALIS) 20 MG tablet, TAKE 1 TABLET BY MOUTH ONE TIME A DAY AS NEEDED FOR ERECTILE DYSFUNCTION, Disp: 30 tablet, Rfl: 11    Continuous Blood Gluc  ([employer]YLE AMANDA 14 DAY READER) ABIGAIL, USE AS DIRECTED BEFORE MEALS AND NIGHTLY, Disp: 1 Device, Rfl: 0    Lancets MISC, 1 each by Does not apply route 4 times daily (before meals and nightly), Disp: 150 each, Rfl: 3    FREESTYLE LITE strip, TEST TWICE A DAY AS DIRECTED, Disp: 100 strip, Rfl: 1    Blood Glucose Monitoring Suppl (FREESTYLE LITE) ABIGAIL, 1 Device by Does not apply route daily Dx: 250.00, non-insulin dependent, Disp: 1 Device, Rfl: prn    Multiple Vitamin (MULTI-VITAMIN) TABS, Take 1 tablet by mouth daily. , Disp: , Rfl:   Lab Results   Component Value Date     03/16/2021    K 4.7 03/16/2021     03/16/2021    CO2 25 03/16/2021    BUN 20 03/16/2021    CREATININE 0.63 (L) 03/16/2021    GLUCOSE 181 (H) 03/16/2021    CALCIUM 9.4 03/16/2021    PROT 7.6 03/16/2021    LABALBU 4.6 03/16/2021    BILITOT 0.5 03/16/2021    ALKPHOS 109 (H) 03/16/2021    AST 15 03/16/2021    ALT 21 03/16/2021    LABGLOM >60.0 03/16/2021    GFRAA >60.0 03/16/2021    GLOB 3.0 03/16/2021     Lab Results   Component Value Date    WBC 6.7 09/17/2019    HGB 14.8 09/17/2019    HCT 42.3 09/17/2019    MCV 87.1 09/17/2019     09/17/2019     Lab Results   Component Value Date    LABA1C 5.9 03/16/2021    LABA1C 5.4 01/20/2021    LABA1C 6.9 10/21/2020     Lab Results   Component Value Date    CHOL 75 03/14/2020    CHOL 89 07/18/2018     Lab Results   Component Value Date    TRIG 89 03/14/2020    TRIG 65 07/18/2018     Lab Results   Component Value Date    HDL 32 (L) 03/14/2020    HDL 33 (L) 07/18/2018     Lab Results   Component Value Date    LDLCALC 25 03/14/2020    LDLCALC 43 07/18/2018     No results found for: LABVLDL, VLDL  No results found for: CHOLHDLRATIO  No results found for: TESTM  Lab Results   Component Value Date    TSH 1.150 09/17/2019       Review of Systems   Constitutional: Negative for chills, fatigue and fever.    HENT: Negative for congestion, ear pain, postnasal drip, rhinorrhea, sinus pressure and sore throat. Eyes: Positive for blurred vision. Negative for pain and redness. Respiratory: Negative for cough, shortness of breath and wheezing. Cardiovascular: Negative for chest pain, palpitations and leg swelling. Gastrointestinal: Negative for abdominal pain, diarrhea, nausea and vomiting. Endocrine: Negative for cold intolerance, heat intolerance, polydipsia and polyuria. Genitourinary: Negative for difficulty urinating. Musculoskeletal: Negative for arthralgias. Skin: Negative for rash. Allergic/Immunologic: Negative for food allergies. Neurological: Negative for dizziness and headaches. Hematological: Negative for adenopathy. Psychiatric/Behavioral: Negative for dysphoric mood.        Objective:   Physical Exam

## 2022-01-25 DIAGNOSIS — E11.8 TYPE 2 DIABETES MELLITUS WITH COMPLICATION, WITHOUT LONG-TERM CURRENT USE OF INSULIN (HCC): ICD-10-CM

## 2022-01-25 LAB
ALBUMIN SERPL-MCNC: 4.8 G/DL (ref 3.5–4.6)
ALP BLD-CCNC: 104 U/L (ref 35–104)
ALT SERPL-CCNC: 21 U/L (ref 0–41)
ANION GAP SERPL CALCULATED.3IONS-SCNC: 11 MEQ/L (ref 9–15)
AST SERPL-CCNC: 16 U/L (ref 0–40)
BILIRUB SERPL-MCNC: 0.6 MG/DL (ref 0.2–0.7)
BUN BLDV-MCNC: 17 MG/DL (ref 6–20)
CALCIUM SERPL-MCNC: 9.1 MG/DL (ref 8.5–9.9)
CHLORIDE BLD-SCNC: 103 MEQ/L (ref 95–107)
CHOLESTEROL, TOTAL: 96 MG/DL (ref 0–199)
CO2: 26 MEQ/L (ref 20–31)
CREAT SERPL-MCNC: 0.57 MG/DL (ref 0.7–1.2)
GFR AFRICAN AMERICAN: >60
GFR NON-AFRICAN AMERICAN: >60
GLOBULIN: 2.6 G/DL (ref 2.3–3.5)
GLUCOSE BLD-MCNC: 117 MG/DL (ref 70–99)
HBA1C MFR BLD: 5.9 % (ref 4.8–5.9)
HDLC SERPL-MCNC: 41 MG/DL (ref 40–59)
LDL CHOLESTEROL CALCULATED: 46 MG/DL (ref 0–129)
POTASSIUM SERPL-SCNC: 4.5 MEQ/L (ref 3.4–4.9)
SODIUM BLD-SCNC: 140 MEQ/L (ref 135–144)
TOTAL PROTEIN: 7.4 G/DL (ref 6.3–8)
TRIGL SERPL-MCNC: 44 MG/DL (ref 0–150)

## 2022-04-19 DIAGNOSIS — N40.0 BENIGN PROSTATIC HYPERPLASIA WITHOUT LOWER URINARY TRACT SYMPTOMS: Primary | ICD-10-CM

## 2022-04-19 RX ORDER — TADALAFIL 20 MG/1
TABLET ORAL
Qty: 90 TABLET | Refills: 3 | Status: SHIPPED | OUTPATIENT
Start: 2022-04-19

## 2022-04-21 DIAGNOSIS — N40.0 BENIGN PROSTATIC HYPERPLASIA WITHOUT LOWER URINARY TRACT SYMPTOMS: ICD-10-CM

## 2022-04-21 LAB — PROSTATE SPECIFIC ANTIGEN: 0.88 NG/ML (ref 0–4)

## 2022-04-26 ENCOUNTER — OFFICE VISIT (OUTPATIENT)
Dept: UROLOGY | Age: 52
End: 2022-04-26
Payer: COMMERCIAL

## 2022-04-26 VITALS
HEIGHT: 74 IN | DIASTOLIC BLOOD PRESSURE: 74 MMHG | WEIGHT: 230 LBS | HEART RATE: 82 BPM | BODY MASS INDEX: 29.52 KG/M2 | SYSTOLIC BLOOD PRESSURE: 114 MMHG | OXYGEN SATURATION: 97 %

## 2022-04-26 DIAGNOSIS — N40.0 BENIGN PROSTATIC HYPERPLASIA WITHOUT LOWER URINARY TRACT SYMPTOMS: Primary | ICD-10-CM

## 2022-04-26 PROCEDURE — 99213 OFFICE O/P EST LOW 20 MIN: CPT | Performed by: UROLOGY

## 2022-04-26 RX ORDER — TADALAFIL 20 MG/1
20 TABLET ORAL DAILY PRN
Qty: 90 TABLET | Refills: 3 | Status: SHIPPED | OUTPATIENT
Start: 2022-04-26

## 2022-04-26 NOTE — PROGRESS NOTES
[facility] EVALUATION NOTE                                                 H&P                                                                                                                                                 Reason for Visit  ED secondary to diabetes    History of Present Illness  77-year-old male who was eventually seen by endocrinology and has had significant improvement in his blood sugar control  Patient has improved with his response to Cialis and his voiding symptoms have improved significantly since having better blood sugar control  Current A1c is at 5.9  PSA is 0.88  Patient takes Cialis daily 20 mg dose has no issues with that and is very active sexually      Urologic Review of Systems/Symptoms  No issues with voiding    Review of Systems  Hospitalization: None recent  All 14 categories of Review of Systems otherwise reviewed no other findings reported. Recent hemoglobin A1c is 5.9  Past Medical History:   Diagnosis Date    BPH without urinary obstruction     Bunion of great toe of right foot     Controlled diabetes mellitus type 2 with complications ([address] 5952    Diabetic neuropathy associated with type 2 diabetes mellitus ([address]     EMG Dr Grecia Leonard Diverticulitis     Erectile dysfunction     Dr Julissa Baum     Folic acid deficiency 8153    GERD (gastroesophageal reflux disease)     Low HDL (under 40)     Non-compliance with treatment 2015    Obesity (BMI 30-39. 9)      Past Surgical History:   Procedure Laterality Date    ABDOMEN SURGERY  2009    for diverticulitis    ABDOMINAL HERNIA REPAIR  2010    COLON SURGERY  2008    COLONOSCOPY      COLONOSCOPY N/A 9/1/2020    COLORECTAL CANCER SCREENING, HIGH RISK performed by Emma Asif MD at [address] [place] [address], [place], DIAGNOSTIC      THUMB AMPUTATION Right     due to accident     Social History     Socioeconomic History    Marital status:      Spouse name: None    Number of children: 5    Years of education: None    Highest education level: None   Occupational History    Occupation: ,      Employer: [employer]   Tobacco Use    Smoking status: Never Smoker    Smokeless tobacco: Never Used   Vaping Use    Vaping Use: Never used   Substance and Sexual Activity    Alcohol use: Yes     Alcohol/week: 0.0 standard drinks     Comment: rare     Drug use: No    Sexual activity: None   Other Topics Concern    None   Social History Narrative    Born in [facility], father from [place], one sister and one brother    , spouse works in a [employer], office job, [employer] at [employer], 3 daughters and 2 sons    Lives in a house in [facility] [place] [facility], with spouse and 4 children    Hobbies: Mu-ism, community service, music (keyboard)             Social Determinants of [address] Strain:     Difficulty of Paying Living Expenses: Not on [address]:    [address] in the Last Year: Not on file    [address] in the Last Year: Not on file   Transportation Needs:     Lack of Transportation (Medical): Not on file    Lack of Transportation (Non-Medical):  Not on file   Physical Activity:     Days of Exercise per Week: Not on file    Minutes of Exercise per Session: Not on file   Stress:     Feeling of Stress : Not on file   Social Connections:     Frequency of Communication with Friends and Family: Not on file    Frequency of Social Gatherings with Friends and Family: Not on file    Attends Hoahaoism Services: Not on file    Active Member of Clubs or Organizations: Not on file    Attends Club or Organization Meetings: Not on file    Marital Status: Not on file   Intimate Partner Violence:     Fear of Current or Ex-Partner: Not on file    Emotionally Abused: Not on file    Physically Abused: Not on file    Sexually Abused: Not on file   Housing Stability:     Unable to Pay for Housing in the Last Year: Not on file    Number of Places Lived in the Last Year: Not on file    Unstable Housing in the Last Year: Not on file     Family History   Problem Relation Age of Onset    Diabetes Father     Hypertension Mother     Colon Cancer Neg Hx      Current Outpatient Medications   Medication Sig Dispense Refill    tadalafil (CIALIS) 20 MG tablet Take 1 tablet by mouth daily as needed for Erectile Dysfunction 90 tablet 3    tadalafil (CIALIS) 20 MG tablet TAKE 1 TABLET BY MOUTH ONE TIME A DAY 90 tablet 3    dapagliflozin (FARXIGA) 10 MG tablet TAKE 1 TABLET BY MOUTH DAILY MAY SUBSTITUTE FOR GENERIC OR COVERED MEDICATION 90 tablet 1    Semaglutide, 1 MG/DOSE, (OZEMPIC, 1 MG/DOSE,) 4 MG/3ML SOPN Inject 1 mg into the skin once a week 9 mL 1    pravastatin (PRAVACHOL) 10 MG tablet TAKE ONE TABLET BY MOUTH EVERY DAY IN THE EVENING 90 tablet 3    folic acid (FOLVITE) 1 MG tablet TAKE 1 TABLET BY MOUTH EVERY DAY 90 tablet 4    omeprazole (PRILOSEC) 20 MG delayed release capsule TAKE 1 CAPSULE BY MOUTH EVERY DAY 90 capsule 2    lisinopril (PRINIVIL;ZESTRIL) 5 MG tablet TAKE 1 TABLET BY MOUTH EVERY DAY 90 tablet 5    Continuous Blood Gluc Sensor (FREESTYLE AMANDA 14 DAY SENSOR) MISC USE AS DIRECTED EVERY 14 DAYS 4 each 6    Continuous Blood Gluc  (FREESTYLE AMANDA 14 DAY READER) ABIGAIL USE AS DIRECTED BEFORE MEALS AND NIGHTLY 1 Device 0    Lancets MISC 1 each by Does not apply route 4 times daily (before meals and nightly) 150 each 3    FREESTYLE LITE strip TEST TWICE A DAY AS DIRECTED 100 strip 1    Blood Glucose Monitoring Suppl (FREESTYLE LITE) ABIGAIL 1 Device by Does not apply route daily Dx: 250.00, non-insulin dependent 1 Device prn    Multiple Vitamin (MULTI-VITAMIN) TABS Take 1 tablet by mouth daily. No current facility-administered medications for this visit. Patient has no known allergies.   All reviewed and verified by Dr Billie Jdae on today's visit    PSA   Date Value Ref Range Status   04/21/2022 0.88 0.00 - 4.00 ng/mL Final   03/16/2021 0.77 0.00 - 3.89 ng/mL Final   03/12/2020 0.53 0.00 - 3.89 ng/mL Final     No results found for this visit on 04/26/22. Physical Exam  Vitals:    04/26/22 0845   BP: 114/74   Pulse: 82   SpO2: 97%   Weight: 230 lb (104.3 kg)   Height: 6' 2\" (1.88 m)     Constitutional: Not in distress  Physical exam otherwise unchanged  Patient defers prostate exam.  Assessment/Medical Necessity-Decision Making  Erectile dysfunction secondary to diabetes  PSA stable at 0.88  A1c is at 5.9  Voiding symptoms have completely improved with better blood sugar control  Response to Cialis is improved with blood sugar control  Plan  Cialis prescription given  PSA 1 year with follow-up  Greater than 50% of 20 minutes spent consulting patient face-to-face  Orders Placed This Encounter   Procedures    PSA, Diagnostic     Standing Status:   Future     Standing Expiration Date:   4/26/2023     Orders Placed This Encounter   Medications    tadalafil (CIALIS) 20 MG tablet     Sig: Take 1 tablet by mouth daily as needed for Erectile Dysfunction     Dispense:  90 tablet     Refill:  3     Joselin Leal MD       Please note this report has been partially produced using speech recognition software  And may cause contain errors related to that system including grammar, punctuation and spelling as well as words and phrases that may seem inappropriate. If there are questions or concerns please feel free to contact me to clarify.

## 2022-05-24 ENCOUNTER — OFFICE VISIT (OUTPATIENT)
Dept: FAMILY MEDICINE CLINIC | Age: 52
End: 2022-05-24
Payer: COMMERCIAL

## 2022-05-24 VITALS
HEIGHT: 74 IN | SYSTOLIC BLOOD PRESSURE: 138 MMHG | HEART RATE: 89 BPM | TEMPERATURE: 96.9 F | WEIGHT: 236 LBS | OXYGEN SATURATION: 96 % | DIASTOLIC BLOOD PRESSURE: 78 MMHG | BODY MASS INDEX: 30.29 KG/M2

## 2022-05-24 DIAGNOSIS — I83.12 VARICOSE VEINS OF BOTH LOWER EXTREMITIES WITH INFLAMMATION: ICD-10-CM

## 2022-05-24 DIAGNOSIS — E11.8 TYPE 2 DIABETES MELLITUS WITH COMPLICATION, WITHOUT LONG-TERM CURRENT USE OF INSULIN (HCC): ICD-10-CM

## 2022-05-24 DIAGNOSIS — N52.1 ERECTILE DYSFUNCTION ASSOCIATED WITH TYPE 2 DIABETES MELLITUS (HCC): ICD-10-CM

## 2022-05-24 DIAGNOSIS — L20.89 FLEXURAL ATOPIC DERMATITIS: ICD-10-CM

## 2022-05-24 DIAGNOSIS — K21.9 GASTROESOPHAGEAL REFLUX DISEASE WITHOUT ESOPHAGITIS: ICD-10-CM

## 2022-05-24 DIAGNOSIS — E11.69 ERECTILE DYSFUNCTION ASSOCIATED WITH TYPE 2 DIABETES MELLITUS (HCC): ICD-10-CM

## 2022-05-24 DIAGNOSIS — I83.11 VARICOSE VEINS OF BOTH LOWER EXTREMITIES WITH INFLAMMATION: ICD-10-CM

## 2022-05-24 DIAGNOSIS — Z00.00 ENCOUNTER FOR ANNUAL PHYSICAL EXAM: Primary | ICD-10-CM

## 2022-05-24 PROCEDURE — 99396 PREV VISIT EST AGE 40-64: CPT | Performed by: PHYSICIAN ASSISTANT

## 2022-05-24 RX ORDER — BETAMETHASONE DIPROPIONATE 0.05 %
OINTMENT (GRAM) TOPICAL
Qty: 60 G | Refills: 2 | Status: SHIPPED | OUTPATIENT
Start: 2022-05-24 | End: 2022-09-13

## 2022-05-24 SDOH — ECONOMIC STABILITY: TRANSPORTATION INSECURITY
IN THE PAST 12 MONTHS, HAS THE LACK OF TRANSPORTATION KEPT YOU FROM MEDICAL APPOINTMENTS OR FROM GETTING MEDICATIONS?: NO

## 2022-05-24 SDOH — ECONOMIC STABILITY: FOOD INSECURITY: WITHIN THE PAST 12 MONTHS, YOU WORRIED THAT YOUR FOOD WOULD RUN OUT BEFORE YOU GOT MONEY TO BUY MORE.: NEVER TRUE

## 2022-05-24 SDOH — ECONOMIC STABILITY: FOOD INSECURITY: WITHIN THE PAST 12 MONTHS, THE FOOD YOU BOUGHT JUST DIDN'T LAST AND YOU DIDN'T HAVE MONEY TO GET MORE.: NEVER TRUE

## 2022-05-24 SDOH — ECONOMIC STABILITY: TRANSPORTATION INSECURITY
IN THE PAST 12 MONTHS, HAS LACK OF TRANSPORTATION KEPT YOU FROM MEETINGS, WORK, OR FROM GETTING THINGS NEEDED FOR DAILY LIVING?: NO

## 2022-05-24 ASSESSMENT — PATIENT HEALTH QUESTIONNAIRE - PHQ9
SUM OF ALL RESPONSES TO PHQ QUESTIONS 1-9: 0
1. LITTLE INTEREST OR PLEASURE IN DOING THINGS: 0
SUM OF ALL RESPONSES TO PHQ9 QUESTIONS 1 & 2: 0
SUM OF ALL RESPONSES TO PHQ QUESTIONS 1-9: 0
2. FEELING DOWN, DEPRESSED OR HOPELESS: 0
SUM OF ALL RESPONSES TO PHQ QUESTIONS 1-9: 0
SUM OF ALL RESPONSES TO PHQ QUESTIONS 1-9: 0

## 2022-05-24 ASSESSMENT — ENCOUNTER SYMPTOMS
ABDOMINAL PAIN: 0
SHORTNESS OF BREATH: 0
NAUSEA: 0
CHEST TIGHTNESS: 0
BLOOD IN STOOL: 0
VOMITING: 0
DIARRHEA: 0
PHOTOPHOBIA: 0

## 2022-05-24 ASSESSMENT — SOCIAL DETERMINANTS OF HEALTH (SDOH): HOW HARD IS IT FOR YOU TO PAY FOR THE VERY BASICS LIKE FOOD, HOUSING, MEDICAL CARE, AND HEATING?: NOT HARD AT ALL

## 2022-05-24 NOTE — PROGRESS NOTES
Subjective  Christopher Soda, 46 y.o. male presents today with:  Chief Complaint   Patient presents with    Annual Exam     wants annual physical done and has skin problems with his fingers        HPI   In the office today for annual exam/routine physical.  Has two additional concerns to discuss. Last OV with me: 8/6/2020    Works is a . High stress, but controls it well with exercise, vacations. , has five children. Type 2 diabetes. Followed by Trip Santamaria PA-C. Well-controlled diabetes. Has CGM--which he checks over 4 times daily. Last HgbA1c: 5.9%  Average sugar is around 110-130. He is on ace-I; statin therapy. Current therapy: ozempic weekly, metformin.       GERD. Controlled with PPI. Denies worsening reflux.      BPH with LUTS and ED. Followed by Dr. Kristi Mcwilliams. Taking cialis daily. PSA levels have been stable/normal.      Obesity. Actively losing weight through lifestyle changes. Walks daily for exercise. Doing great on regimen. Varicose veins. Bilateral lower extremities. Present for several years. They will become tender and inflamed. Runs in his family. Brother also had them. Dermatitis. On finger, 4th digit. Has applied OTC cortisone which helps but rash returns. Will itch. Review of Systems   Constitutional: Negative for activity change, appetite change, chills, fatigue, fever and unexpected weight change. HENT: Negative for nosebleeds. Eyes: Negative for photophobia. Respiratory: Negative for chest tightness and shortness of breath. Cardiovascular: Negative for chest pain, palpitations and leg swelling. Gastrointestinal: Negative for abdominal pain, blood in stool, diarrhea, nausea and vomiting. Genitourinary: Negative for decreased urine volume, difficulty urinating, frequency and urgency. Musculoskeletal: Negative for arthralgias and myalgias. Skin: Positive for rash.    Neurological: Negative for dizziness and headaches. Hematological: Does not bruise/bleed easily. Psychiatric/Behavioral: Negative for dysphoric mood. The patient is not nervous/anxious. Past Medical History:   Diagnosis Date    BPH without urinary obstruction     Bunion of great toe of right foot     Controlled diabetes mellitus type 2 with complications ([facility] [address] 0836    Diabetic neuropathy associated with type 2 diabetes mellitus (HCC)     EMG Dr Samy Rivero Diverticulitis     Erectile dysfunction     Dr Yola Oneal     Folic acid deficiency 4611    GERD (gastroesophageal reflux disease)     Low HDL (under 40)     Non-compliance with treatment 2015    Obesity (BMI 30-39. 9)      Past Surgical History:   Procedure Laterality Date    ABDOMEN SURGERY  2009    for diverticulitis    ABDOMINAL HERNIA REPAIR  2010    COLON SURGERY  2008    COLONOSCOPY      COLONOSCOPY N/A 9/1/2020    COLORECTAL CANCER SCREENING, HIGH RISK performed by Moises Shaw MD at [address] [place] [address], DIAGNOSTIC      THUMB AMPUTATION Right     due to accident     Social History     Socioeconomic History    Marital status:      Spouse name: Not on file    Number of children: 11    Years of education: Not on file    Highest education level: Not on file   Occupational History    Occupation: ,      Employer: [employer]   Tobacco Use    Smoking status: Never Smoker    Smokeless tobacco: Never Used   Vaping Use    Vaping Use: Never used   Substance and Sexual Activity    Alcohol use:  Yes     Alcohol/week: 0.0 standard drinks     Comment: rare     Drug use: No    Sexual activity: Not on file   Other Topics Concern    Not on file   Social History Narrative    Born in [facility], father from [place], one sister and one brother    , spouse works in a [address] [place] [address] [place], office job, [employer] at [employer] 5, 3 daughters and 2 sons    Lives in a house in [place], with spouse and 4 children    Hobbies: Yarsani, community service, music (keyboard)             Social Determinants of Health     Financial Resource Strain: Low Risk     Difficulty of Paying Living Expenses: Not hard at all   Food Insecurity: No Food Insecurity    Worried About Running Out of Food in the Last Year: Never true    Yimi of Food in the Last Year: Never true   Transportation Needs: No Transportation Needs    Lack of Transportation (Medical): No    Lack of Transportation (Non-Medical): No   Physical Activity:     Days of Exercise per Week: Not on file    Minutes of Exercise per Session: Not on file   Stress:     Feeling of Stress : Not on file   Social Connections:     Frequency of Communication with Friends and Family: Not on file    Frequency of Social Gatherings with Friends and Family: Not on file    Attends Alevism Services: Not on file    Active Member of [address] [place] [address] [employer] or Organizations: Not on file    Attends Club or Organization Meetings: Not on file    Marital Status: Not on file   Intimate Partner Violence:     Fear of Current or Ex-Partner: Not on file    Emotionally Abused: Not on file    Physically Abused: Not on file    Sexually Abused: Not on file   Housing Stability:     Unable to Pay for Housing in the Last Year: Not on file    Number of [place] in the Last Year: Not on file    Unstable Housing in the Last Year: Not on file     Family History   Problem Relation Age of Onset    Diabetes Father     Hypertension Mother     Colon Cancer Neg Hx      No Known Allergies  Current Outpatient Medications   Medication Sig Dispense Refill    betamethasone dipropionate (DIPROLENE) 0.05 % ointment Apply topically 2 times daily.  60 g 2    tadalafil (CIALIS) 20 MG tablet Take 1 tablet by mouth daily as needed for Erectile Dysfunction 90 tablet 3    tadalafil (CIALIS) 20 MG tablet TAKE 1 TABLET BY MOUTH ONE TIME A DAY 90 tablet 3    dapagliflozin (FARXIGA) 10 MG tablet TAKE 1 TABLET BY MOUTH DAILY MAY SUBSTITUTE FOR GENERIC OR COVERED MEDICATION 90 tablet 1    Semaglutide, 1 MG/DOSE, (OZEMPIC, 1 MG/DOSE,) 4 MG/3ML SOPN Inject 1 mg into the skin once a week 9 mL 1    pravastatin (PRAVACHOL) 10 MG tablet TAKE ONE TABLET BY MOUTH EVERY DAY IN THE EVENING 90 tablet 3    folic acid (FOLVITE) 1 MG tablet TAKE 1 TABLET BY MOUTH EVERY DAY 90 tablet 4    omeprazole (PRILOSEC) 20 MG delayed release capsule TAKE 1 CAPSULE BY MOUTH EVERY DAY 90 capsule 2    lisinopril (PRINIVIL;ZESTRIL) 5 MG tablet TAKE 1 TABLET BY MOUTH EVERY DAY 90 tablet 5    Continuous Blood Gluc Sensor (FREESTYLE AMANDA 14 DAY SENSOR) MISC USE AS DIRECTED EVERY 14 DAYS 4 each 6    Continuous Blood Gluc  (FREESTYLE AMANDA 14 DAY READER) ABIGAIL USE AS DIRECTED BEFORE MEALS AND NIGHTLY 1 Device 0    Multiple Vitamin (MULTI-VITAMIN) TABS Take 1 tablet by mouth daily. No current facility-administered medications for this visit. PMH, Surgical Hx, Family Hx, and Social Hx reviewed and updated. Health Maintenance reviewed. Objective  Vitals:    05/24/22 0806   BP: 138/78   Pulse: 89   Temp: 96.9 °F (36.1 °C)   TempSrc: Temporal   SpO2: 96%   Weight: 236 lb (107 kg)   Height: 6' 2\" (1.88 m)     BP Readings from Last 3 Encounters:   05/24/22 138/78   04/26/22 114/74   03/18/21 126/68     Wt Readings from Last 3 Encounters:   05/24/22 236 lb (107 kg)   04/26/22 230 lb (104.3 kg)   03/18/21 225 lb (102.1 kg)     Physical Exam  Vitals reviewed. Constitutional:       General: He is not in acute distress. Appearance: He is well-developed. He is not ill-appearing, toxic-appearing or diaphoretic. HENT:      Head: Normocephalic and atraumatic.       Right Ear: Tympanic membrane, ear canal and external ear normal.      Left Ear: Tympanic membrane, ear canal and external ear normal.      Nose: Nose normal.      Mouth/Throat:      Mouth: Mucous membranes are moist.   Eyes:      Conjunctiva/sclera: Conjunctivae normal.   Cardiovascular:      Rate and Rhythm: Normal rate and regular rhythm. Pulmonary:      Effort: Pulmonary effort is normal.      Breath sounds: Normal breath sounds. Musculoskeletal:         General: Tenderness present. Cervical back: Normal range of motion. Right lower leg: No edema. Left lower leg: No edema. Comments: Bilateral varicose veins of LEs noted on exam.  Mild induration/inflamation present. Skin:     General: Skin is warm and dry. Findings: Rash (dermatitis of 4th digit, L hand, dorsal surface noted) present. Neurological:      General: No focal deficit present. Mental Status: He is alert and oriented to person, place, and time. Cranial Nerves: No cranial nerve deficit. Psychiatric:         Mood and Affect: Mood normal.         Behavior: Behavior normal.         Thought Content: Thought content normal.         Judgment: Judgment normal.       Assessment & Plan   Flo Martinez was seen today for annual exam.    Diagnoses and all orders for this visit:    Encounter for annual physical exam    Varicose veins of both lower extremities with inflammation  -     Carlos Dooley NP, Interventional Radiology, [place]    Flexural atopic dermatitis  -     betamethasone dipropionate (DIPROLENE) 0.05 % ointment; Apply topically 2 times daily. Gastroesophageal reflux disease without esophagitis    Type 2 diabetes mellitus with complication, without long-term current use of insulin (HCC)    Erectile dysfunction associated with type 2 diabetes mellitus ([address]    Doing very well. Continue current medications. Discussed diprolene.      Orders Placed This Encounter   Procedures   [address], Interventional Radiology, [place]     Referral Priority:   Routine     Referral Type:   Eval and Treat     Referral Reason:   Specialty Services Required     Referred to Provider:   SUZETTE Wiley - CNP     Requested Specialty:   Nurse Practitioner     Number of Visits Requested:   1     Orders Placed This Encounter   Medications    betamethasone dipropionate (DIPROLENE) 0.05 % ointment     Sig: Apply topically 2 times daily. Dispense:  60 g     Refill:  2     Medications Discontinued During This Encounter   Medication Reason    Lancets MISC Therapy completed    FREESTYLE LITE strip Therapy completed    Blood Glucose Monitoring Suppl (FREESTYLE LITE) ABIGAIL Therapy completed     Return in about 1 year (around 5/24/2023) for annual.      Reviewed with the patient: current clinical status, medications, activities and diet. Side effects, adverse effects of the medication prescribed today, as well as treatment plan/ rationale and result expectations have been discussed with the patient who expresses understanding and desires to proceed. Close follow up to evaluate treatment results and for coordination of care. I have reviewed the patient's medical history in detail and updated the computerized patient record.     Codie Andrade PA-C

## 2022-06-27 RX ORDER — FLASH GLUCOSE SENSOR
KIT MISCELLANEOUS
Qty: 4 EACH | Refills: 6 | Status: SHIPPED | OUTPATIENT
Start: 2022-06-27 | End: 2022-09-19 | Stop reason: SDUPTHER

## 2022-07-17 DIAGNOSIS — E11.8 TYPE 2 DIABETES MELLITUS WITH COMPLICATION, WITHOUT LONG-TERM CURRENT USE OF INSULIN (HCC): ICD-10-CM

## 2022-07-20 DIAGNOSIS — E11.8 TYPE 2 DIABETES MELLITUS WITH COMPLICATION, WITHOUT LONG-TERM CURRENT USE OF INSULIN (HCC): ICD-10-CM

## 2022-07-20 RX ORDER — LISINOPRIL 5 MG/1
TABLET ORAL
Qty: 90 TABLET | Refills: 5 | Status: SHIPPED | OUTPATIENT
Start: 2022-07-20

## 2022-07-20 RX ORDER — OMEPRAZOLE 20 MG/1
CAPSULE, DELAYED RELEASE ORAL
Qty: 90 CAPSULE | Refills: 2 | Status: SHIPPED | OUTPATIENT
Start: 2022-07-20

## 2022-09-06 RX ORDER — SEMAGLUTIDE 1.34 MG/ML
1 INJECTION, SOLUTION SUBCUTANEOUS WEEKLY
Refills: 1 | OUTPATIENT
Start: 2022-09-06

## 2022-09-13 DIAGNOSIS — L20.89 FLEXURAL ATOPIC DERMATITIS: ICD-10-CM

## 2022-09-13 RX ORDER — BETAMETHASONE DIPROPIONATE 0.05 %
OINTMENT (GRAM) TOPICAL
Qty: 60 G | Refills: 2 | Status: SHIPPED | OUTPATIENT
Start: 2022-09-13

## 2022-09-13 NOTE — TELEPHONE ENCOUNTER
Rx request   Requested Prescriptions     Pending Prescriptions Disp Refills    betamethasone dipropionate (DIPROLENE) 0.05 % ointment [Pharmacy Med Name: BETAMETHASONE DP 0.05% OINT] 60 g 2     Sig: APPLY TO AFFECTED AREA TWICE A DAY     [facility] 5/24/2022  Lat refill 5/24/22  Next Visit Date:  Future Appointments   Date Time Provider [place] Yarbrough   4/27/2023  8:15 AM Yamel Granados MD [facility]

## 2022-09-19 ENCOUNTER — OFFICE VISIT (OUTPATIENT)
Dept: ENDOCRINOLOGY | Age: 52
End: 2022-09-19
Payer: COMMERCIAL

## 2022-09-19 VITALS
DIASTOLIC BLOOD PRESSURE: 77 MMHG | OXYGEN SATURATION: 96 % | HEIGHT: 74 IN | WEIGHT: 234 LBS | HEART RATE: 80 BPM | SYSTOLIC BLOOD PRESSURE: 123 MMHG | BODY MASS INDEX: 30.03 KG/M2

## 2022-09-19 DIAGNOSIS — E11.8 TYPE 2 DIABETES MELLITUS WITH COMPLICATION, WITHOUT LONG-TERM CURRENT USE OF INSULIN (HCC): Primary | ICD-10-CM

## 2022-09-19 DIAGNOSIS — E11.8 TYPE 2 DIABETES MELLITUS WITH COMPLICATION, WITHOUT LONG-TERM CURRENT USE OF INSULIN (HCC): ICD-10-CM

## 2022-09-19 LAB
ALBUMIN SERPL-MCNC: 4.9 G/DL (ref 3.5–4.6)
ALP BLD-CCNC: 99 U/L (ref 35–104)
ALT SERPL-CCNC: 26 U/L (ref 0–41)
ANION GAP SERPL CALCULATED.3IONS-SCNC: 9 MEQ/L (ref 9–15)
AST SERPL-CCNC: 17 U/L (ref 0–40)
BILIRUB SERPL-MCNC: 0.7 MG/DL (ref 0.2–0.7)
BUN BLDV-MCNC: 18 MG/DL (ref 6–20)
CALCIUM SERPL-MCNC: 9.3 MG/DL (ref 8.5–9.9)
CHLORIDE BLD-SCNC: 102 MEQ/L (ref 95–107)
CHP ED QC CHECK: ABNORMAL
CO2: 29 MEQ/L (ref 20–31)
CREAT SERPL-MCNC: 0.55 MG/DL (ref 0.7–1.2)
CREATININE URINE: 75 MG/DL
GFR AFRICAN AMERICAN: >60
GFR NON-AFRICAN AMERICAN: >60
GLOBULIN: 2.9 G/DL (ref 2.3–3.5)
GLUCOSE BLD-MCNC: 153 MG/DL
GLUCOSE BLD-MCNC: 158 MG/DL (ref 70–99)
HBA1C MFR BLD: 6 %
HBA1C MFR BLD: 6.1 % (ref 4.8–5.9)
MICROALBUMIN UR-MCNC: <1.2 MG/DL
MICROALBUMIN/CREAT UR-RTO: NORMAL MG/G (ref 0–30)
POTASSIUM SERPL-SCNC: 4.6 MEQ/L (ref 3.4–4.9)
SODIUM BLD-SCNC: 140 MEQ/L (ref 135–144)
TOTAL PROTEIN: 7.8 G/DL (ref 6.3–8)

## 2022-09-19 PROCEDURE — 83036 HEMOGLOBIN GLYCOSYLATED A1C: CPT | Performed by: PHYSICIAN ASSISTANT

## 2022-09-19 PROCEDURE — 3044F HG A1C LEVEL LT 7.0%: CPT | Performed by: PHYSICIAN ASSISTANT

## 2022-09-19 PROCEDURE — 99214 OFFICE O/P EST MOD 30 MIN: CPT | Performed by: PHYSICIAN ASSISTANT

## 2022-09-19 PROCEDURE — 82962 GLUCOSE BLOOD TEST: CPT | Performed by: PHYSICIAN ASSISTANT

## 2022-09-19 RX ORDER — SEMAGLUTIDE 1.34 MG/ML
1 INJECTION, SOLUTION SUBCUTANEOUS WEEKLY
Qty: 9 ML | Refills: 1 | Status: SHIPPED | OUTPATIENT
Start: 2022-09-19

## 2022-09-19 RX ORDER — FLASH GLUCOSE SENSOR
KIT MISCELLANEOUS
Qty: 6 EACH | Refills: 5 | Status: SHIPPED | OUTPATIENT
Start: 2022-09-19

## 2022-09-19 ASSESSMENT — ENCOUNTER SYMPTOMS
DIARRHEA: 0
WHEEZING: 0
SHORTNESS OF BREATH: 0
SINUS PRESSURE: 0
NAUSEA: 0
BLURRED VISION: 1
ABDOMINAL PAIN: 0
EYE REDNESS: 0
COUGH: 0
VOMITING: 0
RHINORRHEA: 0
EYE PAIN: 0
SORE THROAT: 0

## 2022-09-19 NOTE — PATIENT INSTRUCTIONS
Endocrinology-    Check your blood sugars 4 times a day, before meals and at night  Document these numbers in a blood glucose log and bring them with you to your follow-up appointment. If you are prescribed insulin, Do not take your mealtime insulin if your blood sugars less than 120   Call our office if you have blood sugars less than 80 or greater then 300 on two or more occasions  Call our office if you have any questions regarding your blood sugars or insulin dosing regiment  Signs of low blood sugar may include tremors, feeling shaky, sweating, dizziness, confusion and weakness. Check your blood sugar immediatly if you have any of these symptoms.      The plan as discussed at your appointment-

## 2022-09-19 NOTE — PROGRESS NOTES
Adult   Pulse: 80   SpO2: 96%   Weight: 234 lb (106.1 kg)   Height: 6' 2\" (1.88 m)     Wt Readings from Last 3 Encounters:   09/19/22 234 lb (106.1 kg)   05/24/22 236 lb (107 kg)   04/26/22 230 lb (104.3 kg)     BP Readings from Last 3 Encounters:   09/19/22 123/77   05/24/22 138/78   04/26/22 114/74     Roberto Tafoya is a 66-year-old  type II diabetic male with glucose levels increasing. A1C up to 6%, overall health is good, AVG glucose 140    Diabetes  He presents for his initial diabetic visit. He has type 2 diabetes mellitus. No MedicAlert identification noted. The initial diagnosis of diabetes was made 20 years ago. His disease course has been worsening. There are no hypoglycemic associated symptoms. Pertinent negatives for hypoglycemia include no dizziness or headaches. Associated symptoms include blurred vision. Pertinent negatives for diabetes include no chest pain, no fatigue, no polydipsia and no polyuria. There are no hypoglycemic complications. Symptoms are stable. There are no diabetic complications. Risk factors for coronary artery disease include diabetes mellitus, male sex, obesity and hypertension. Current diabetic treatment includes oral agent (dual therapy). He is compliant with treatment all of the time. He is following a generally unhealthy diet. Meal planning includes avoidance of concentrated sweets. He has had a previous visit with a dietitian. He participates in exercise intermittently. He monitors blood glucose at home 3-4 x per day. Blood glucose monitoring compliance is excellent. An ACE inhibitor/angiotensin II receptor blocker is being taken. He does not see a podiatrist.Eye exam is current.    Past Medical History:   Diagnosis Date    BPH without urinary obstruction     Bunion of great toe of right foot     Controlled diabetes mellitus type 2 with complications ([address] 4645    Diabetic neuropathy associated with type 2 diabetes mellitus ([address]     EMG Dr Abel Jaime    Diverticulitis Erectile dysfunction     Dr Galvin Rho acid deficiency 7329    GERD (gastroesophageal reflux disease)     Low HDL (under 40)     Non-compliance with treatment 2015    Obesity (BMI 30-39. 9)      Past Surgical History:   Procedure Laterality Date    ABDOMEN SURGERY  2009    for diverticulitis    ABDOMINAL HERNIA REPAIR  2010    COLON SURGERY  2008    COLONOSCOPY      COLONOSCOPY N/A 9/1/2020    COLORECTAL CANCER SCREENING, HIGH RISK performed by Dilcia Otero MD at [address], DIAGNOSTIC      THUMB AMPUTATION Right     due to accident     Social History     Socioeconomic History    Marital status:      Spouse name: Not on file    Number of children: 5    Years of education: Not on file    Highest education level: Not on file   Occupational History    Occupation: ,      Employer: [employer]   Tobacco Use    Smoking status: Never    Smokeless tobacco: Never   Vaping Use    Vaping Use: Never used   Substance and Sexual Activity    Alcohol use:  Yes     Alcohol/week: 0.0 standard drinks     Comment: rare     Drug use: No    Sexual activity: Not on file   Other Topics Concern    Not on file   Social History Narrative    Born in [facility], father from [place], one sister and one brother    , spouse works in a [employer] office job, [employer] at [employer] 5, 3 daughters and 2 sons    Lives in a house in [place], with spouse and 4 children    Hobbies: Alevism, community service, music (keyboard)             [address] [facility] Strain: Low Risk     Difficulty of Paying Living Expenses: Not hard at [address]: No Food Insecurity    Worried About [address] [facility] in the Last Year: Never true    [address] in the Last Year: Never true   Transportation Needs: No Transportation Needs    Lack of Transportation (Medical): No    Lack of Transportation (Non-Medical): No   Physical Activity: Not on file   Stress: Not on file   Social Connections: Not on file   Intimate Partner Violence: Not on file   Housing Stability: Not on file     Family History   Problem Relation Age of Onset    Diabetes Father     Hypertension Mother     Colon Cancer Neg Hx      No Known Allergies    Current Outpatient Medications:     Semaglutide, 1 MG/DOSE, (OZEMPIC, 1 MG/DOSE,) 4 MG/3ML SOPN, Inject 1 mg into the skin once a week, Disp: 9 mL, Rfl: 1    empagliflozin (JARDIANCE) 10 MG tablet, Take 1 tablet by mouth daily, Disp: 30 tablet, Rfl: 3    Continuous Blood Gluc Sensor (FREESTYLE AMANDA 14 DAY SENSOR) MISC, USE AS DIRECTED EVERY 14 DAYS, Disp: 6 each, Rfl: 5    betamethasone dipropionate (DIPROLENE) 0.05 % ointment, APPLY TO AFFECTED AREA TWICE A DAY, Disp: 60 g, Rfl: 2    omeprazole (PRILOSEC) 20 MG delayed release capsule, TAKE 1 CAPSULE BY MOUTH EVERY DAY, Disp: 90 capsule, Rfl: 2    dapagliflozin (FARXIGA) 10 MG tablet, TAKE 1 TABLET BY MOUTH DAILY, Disp: 90 tablet, Rfl: 1    lisinopril (PRINIVIL;ZESTRIL) 5 MG tablet, TAKE 1 TABLET BY MOUTH EVERY DAY, Disp: 90 tablet, Rfl: 5    tadalafil (CIALIS) 20 MG tablet, Take 1 tablet by mouth daily as needed for Erectile Dysfunction, Disp: 90 tablet, Rfl: 3    tadalafil (CIALIS) 20 MG tablet, TAKE 1 TABLET BY MOUTH ONE TIME A DAY, Disp: 90 tablet, Rfl: 3    pravastatin (PRAVACHOL) 10 MG tablet, TAKE ONE TABLET BY MOUTH EVERY DAY IN THE EVENING, Disp: 90 tablet, Rfl: 3    folic acid (FOLVITE) 1 MG tablet, TAKE 1 TABLET BY MOUTH EVERY DAY, Disp: 90 tablet, Rfl: 4    Continuous Blood Gluc  (FREESTYLE AMANDA 14 DAY READER) ABIGAIL, USE AS DIRECTED BEFORE MEALS AND NIGHTLY, Disp: 1 Device, Rfl: 0    Multiple Vitamin (MULTI-VITAMIN) TABS, Take 1 tablet by mouth daily.   , Disp: , Rfl:   Lab Results   Component Value Date     01/25/2022    K 4.5 01/25/2022     01/25/2022    CO2 26 01/25/2022    BUN 17 01/25/2022    CREATININE 0.57 (L) 01/25/2022    GLUCOSE 153 (H) 09/19/2022    CALCIUM 9.1 01/25/2022    PROT 7.4 01/25/2022    LABALBU 4.8 (H) 01/25/2022    BILITOT 0.6 01/25/2022    ALKPHOS 104 01/25/2022    AST 16 01/25/2022    ALT 21 01/25/2022    LABGLOM >60.0 01/25/2022    GFRAA >60.0 01/25/2022    GLOB 2.6 01/25/2022     Lab Results   Component Value Date    WBC 6.7 09/17/2019    HGB 14.8 09/17/2019    HCT 42.3 09/17/2019    MCV 87.1 09/17/2019     09/17/2019     Lab Results   Component Value Date    LABA1C 6.0 09/19/2022    LABA1C 5.9 01/25/2022    LABA1C 5.9 03/16/2021     Lab Results   Component Value Date    CHOL 96 01/25/2022    CHOL 75 03/14/2020    CHOL 89 07/18/2018     Lab Results   Component Value Date    TRIG 44 01/25/2022    TRIG 89 03/14/2020    TRIG 65 07/18/2018     Lab Results   Component Value Date    HDL 41 01/25/2022    HDL 32 (L) 03/14/2020    HDL 33 (L) 07/18/2018     Lab Results   Component Value Date    LDLCALC 46 01/25/2022    LDLCALC 25 03/14/2020    LDLCALC 43 07/18/2018     No results found for: LABVLDL, VLDL  No results found for: CHOLHDLRATIO  No results found for: TESTM  Lab Results   Component Value Date    TSH 1.150 09/17/2019       Review of Systems   Constitutional:  Negative for chills, fatigue and fever. HENT:  Negative for congestion, ear pain, postnasal drip, rhinorrhea, sinus pressure and sore throat. Eyes:  Positive for blurred vision. Negative for pain and redness. Respiratory:  Negative for cough, shortness of breath and wheezing. Cardiovascular:  Negative for chest pain, palpitations and leg swelling. Gastrointestinal:  Negative for abdominal pain, diarrhea, nausea and vomiting. Endocrine: Negative for cold intolerance, heat intolerance, polydipsia and polyuria. Genitourinary:  Negative for difficulty urinating. Musculoskeletal:  Negative for arthralgias. Skin:  Negative for rash. Allergic/Immunologic: Negative for food allergies.    Neurological:  Negative for dizziness and headaches. Hematological:  Negative for adenopathy. Psychiatric/Behavioral:  Negative for dysphoric mood. Objective:   Physical Exam  Constitutional:       Appearance: Normal appearance. He is well-developed. He is not ill-appearing. HENT:      Head: Normocephalic and atraumatic. Nose: No congestion. Eyes:      Conjunctiva/sclera: Conjunctivae normal.   Cardiovascular:      Rate and Rhythm: Normal rate and regular rhythm. Heart sounds: Normal heart sounds. Pulmonary:      Effort: Pulmonary effort is normal.      Breath sounds: Normal breath sounds. Abdominal:      General: Bowel sounds are normal.      Palpations: Abdomen is soft. Musculoskeletal:         General: Normal range of motion. Cervical back: Normal range of motion and neck supple. Comments: 2+ DP/PT pulses bilaterally with good capillary refill. No wounds, lesions, or ulcerations. Thickened toenails. Mild nonpitting edema. Gross and protective sensation intact bilaterally. Skin:     General: Skin is warm and dry. Neurological:      General: No focal deficit present. Mental Status: He is alert and oriented to person, place, and time.    Psychiatric:         Mood and Affect: Mood normal.

## 2022-09-22 ENCOUNTER — TELEPHONE (OUTPATIENT)
Dept: ENDOCRINOLOGY | Age: 52
End: 2022-09-22

## 2022-09-22 NOTE — TELEPHONE ENCOUNTER
Received phone call from insurance company about prior authorization for ozempic. It is coming back with wrong information on patient, that they cannot be found, so please call the insurance company to figure this out for the patient. The number to contact them is [phone]. Thanks!

## 2022-09-27 NOTE — TELEPHONE ENCOUNTER
Spoke to patient PA for ozempic was approved , now he need PA for [employer]

## 2022-09-30 ENCOUNTER — TELEPHONE (OUTPATIENT)
Dept: ENDOCRINOLOGY | Age: 52
End: 2022-09-30

## 2022-09-30 NOTE — TELEPHONE ENCOUNTER
Pt has been waiting for a PA for jardiance. There is a phone message put in on 9/22/22 was this ever addressed?

## 2022-10-21 NOTE — TELEPHONE ENCOUNTER
Spoke to patient , the 30 day of medication was approved , patient need PA for 90 days what insurance wanted.  PA was faxed for 90 days

## 2022-11-15 DIAGNOSIS — B37.2 YEAST INFECTION OF THE SKIN: ICD-10-CM

## 2022-11-15 RX ORDER — FLUCONAZOLE 150 MG/1
150 TABLET ORAL DAILY
Qty: 7 TABLET | Refills: 0 | Status: SHIPPED | OUTPATIENT
Start: 2022-11-15 | End: 2022-11-22

## 2022-11-15 RX ORDER — FLUCONAZOLE 150 MG/1
150 TABLET ORAL DAILY
Qty: 7 TABLET | Refills: 0 | Status: SHIPPED | OUTPATIENT
Start: 2022-11-15 | End: 2022-11-15 | Stop reason: SDUPTHER

## 2022-11-15 NOTE — PROGRESS NOTES
Patient calls today stating that he has red rash with swelling and discomfort under his foreskin. He is uncircumcised. I explained the risks of SGLT2 inhibitors to him prior to starting the Jardiance and he verbalized understanding and was willing to try it. He had been on Rema [place] for a while with no issues, due to insurance had changed to Synack and got this yeast infection. I told him to stop the medication, I ordered Diflucan and miconazole cream sent to his local pharmacy. Told to call and schedule follow-up with this rash persists.

## 2022-12-28 ENCOUNTER — OFFICE VISIT (OUTPATIENT)
Dept: UROLOGY | Age: 52
End: 2022-12-28
Payer: COMMERCIAL

## 2022-12-28 VITALS
WEIGHT: 228 LBS | BODY MASS INDEX: 29.26 KG/M2 | HEIGHT: 74 IN | DIASTOLIC BLOOD PRESSURE: 70 MMHG | SYSTOLIC BLOOD PRESSURE: 122 MMHG | HEART RATE: 81 BPM

## 2022-12-28 DIAGNOSIS — N47.1 PHIMOSIS: Primary | ICD-10-CM

## 2022-12-28 PROCEDURE — 99213 OFFICE O/P EST LOW 20 MIN: CPT | Performed by: UROLOGY

## 2022-12-28 PROCEDURE — G8427 DOCREV CUR MEDS BY ELIG CLIN: HCPCS | Performed by: UROLOGY

## 2022-12-28 PROCEDURE — 1036F TOBACCO NON-USER: CPT | Performed by: UROLOGY

## 2022-12-28 PROCEDURE — 3017F COLORECTAL CA SCREEN DOC REV: CPT | Performed by: UROLOGY

## 2022-12-28 PROCEDURE — G8417 CALC BMI ABV UP PARAM F/U: HCPCS | Performed by: UROLOGY

## 2022-12-28 PROCEDURE — G8484 FLU IMMUNIZE NO ADMIN: HCPCS | Performed by: UROLOGY

## 2022-12-28 NOTE — PROGRESS NOTES
[facility] EVALUATION NOTE                                                 H&P          Note:  Assessment and plan  53-year old diabetic male who has recently developed mild phimosis secondary to diabetes  Discussed dorsal slit/circumcision patient wants to hold off due to potential loss of sensitivity  He will follow-up in April to be discussed issue      The note below is complete evaluation of patient on follow-up/consultation                                                                                                                                                 Reason for Visit  Phimosis    History of Present Illness  63-year-old male who has had a recent worsening of his phimotic band stenosis  Discussed per circumcision/dorsal slit  He will try to self dilate with foam and lubricant  No plan on performing any type of procedure at this time      Urologic Review of Systems/Symptoms  Unchanged other than the subjective above    Review of Systems  Hospitalization: None recent  All 14 categories of Review of Systems otherwise reviewed no other findings reported. No change review of systems  Past Medical History:   Diagnosis Date    BPH without urinary obstruction     Bunion of great toe of right foot     Controlled diabetes mellitus type 2 with complications ([address] 7677    Diabetic neuropathy associated with type 2 diabetes mellitus ([facility] [address]     EMG Dr Wiliam Phillips    Diverticulitis     Erectile dysfunction     Dr Max Calzada acid deficiency 6087    GERD (gastroesophageal reflux disease)     Low HDL (under 40)     Non-compliance with treatment 2015    Obesity (BMI 30-39. 9)      Past Surgical History:   Procedure Laterality Date    ABDOMEN SURGERY  2009    for diverticulitis    ABDOMINAL HERNIA REPAIR  2010    COLON SURGERY  2008    COLONOSCOPY      COLONOSCOPY N/A 9/1/2020    COLORECTAL CANCER SCREENING, HIGH RISK performed by Wilmer Arora MD at [facility], COLON, DIAGNOSTIC THUMB AMPUTATION Right     due to accident     Social History     Socioeconomic History    Marital status:      Spouse name: None    Number of children: 5    Years of education: None    Highest education level: None   Occupational History    Occupation: ,      Employer: [employer]   Tobacco Use    Smoking status: Never    Smokeless tobacco: Never   Vaping Use    Vaping Use: Never used   Substance and Sexual Activity    Alcohol use:  Yes     Alcohol/week: 0.0 standard drinks     Comment: rare     Drug use: No   Social History Narrative    Born in [facility], father from [place], one sister and one brother    , spouse works in a [employer], office job, [employer] at [facility] [employer], 3 daughters and 2 sons    Lives in a house in [place] [place], with spouse and 4 children    Hobbies: Gnosticism, community service, music (keyboard)             Social Determinants of [address] Strain: Low Risk     Difficulty of Paying Living Expenses: Not hard at [employer] Insecurity: No Food Insecurity    Worried About [address] in the Last Year: Never true    [address] in the Last Year: Never true   Transportation Needs: No Transportation Needs    Lack of Transportation (Medical): No    Lack of Transportation (Non-Medical): No     Family History   Problem Relation Age of Onset    Diabetes Father     Hypertension Mother     Colon Cancer Neg Hx      Current Outpatient Medications   Medication Sig Dispense Refill    Semaglutide, 1 MG/DOSE, (OZEMPIC, 1 MG/DOSE,) 4 MG/3ML SOPN Inject 1 mg into the skin once a week 9 mL 1    empagliflozin (JARDIANCE) 10 MG tablet Take 1 tablet by mouth daily 30 tablet 3    Continuous Blood Gluc Sensor (FREESTYLE AMANDA 14 DAY SENSOR) MISC USE AS DIRECTED EVERY 14 DAYS 6 each 5    betamethasone dipropionate (DIPROLENE) 0.05 % ointment APPLY TO AFFECTED AREA TWICE A DAY 60 g 2    omeprazole (PRILOSEC) 20 MG delayed release capsule TAKE 1 CAPSULE BY MOUTH EVERY DAY 90 capsule 2    lisinopril (PRINIVIL;ZESTRIL) 5 MG tablet TAKE 1 TABLET BY MOUTH EVERY DAY 90 tablet 5    tadalafil (CIALIS) 20 MG tablet Take 1 tablet by mouth daily as needed for Erectile Dysfunction 90 tablet 3    tadalafil (CIALIS) 20 MG tablet TAKE 1 TABLET BY MOUTH ONE TIME A DAY 90 tablet 3    pravastatin (PRAVACHOL) 10 MG tablet TAKE ONE TABLET BY MOUTH EVERY DAY IN THE EVENING 90 tablet 3    folic acid (FOLVITE) 1 MG tablet TAKE 1 TABLET BY MOUTH EVERY DAY 90 tablet 4    Continuous Blood Gluc  (FREESTYLE AMANDA 14 DAY READER) ABIGAIL USE AS DIRECTED BEFORE MEALS AND NIGHTLY 1 Device 0    miconazole (MICOTIN) 2 % cream Apply topically 2 times daily. (Patient not taking: Reported on 12/28/2022) 35 g 1    Multiple Vitamin (MULTI-VITAMIN) TABS Take 1 tablet by mouth daily. (Patient not taking: Reported on 12/28/2022)       No current facility-administered medications for this visit. Patient has no known allergies. All reviewed and verified by Dr Juni Gray on today's visit    PSA   Date Value Ref Range Status   04/21/2022 0.88 0.00 - 4.00 ng/mL Final   03/16/2021 0.77 0.00 - 3.89 ng/mL Final   03/12/2020 0.53 0.00 - 3.89 ng/mL Final     No results found for this visit on 12/28/22. Physical Exam  Vitals:    12/28/22 1240   BP: 122/70   Pulse: 81   Weight: 228 lb (103.4 kg)   Height: 6' 2\" (1.88 m)     Constitutional: Not in distress. Urologic Exam  Penile exam shows mild fibrotic band  Neurologic exam otherwise unchanged  Additional findings  None  Remainder the physical exam is normal  Assessment/Medical Necessity-Decision Making  Mild phimosis  Patient will forego any surgery at this time  Plan  Follow-up in April as planned  Patient may try to self dilate with this time and lubricant  Greater than 50% of 20 minutes spent consulting patient face-to-face  No orders of the defined types were placed in this encounter.     No orders of the defined types were placed in this encounter. Jerald Wright MD       Please note this report has been partially produced using speech recognition software  And may cause contain errors related to that system including grammar, punctuation and spelling as well as words and phrases that may seem inappropriate. If there are questions or concerns please feel free to contact me to clarify.

## 2023-01-03 NOTE — PATIENT INSTRUCTIONS
Endocrinology-diabetes    1. Check your blood sugars 4 times a day, before meals and at night  2. Document these numbers and a blood glucose log and bring them with you to your follow-up appointment. 3. Call our office if you have blood sugars less than 80 or greater then 200 on two or more occasions  4. Call our office if you have any questions regarding your blood sugars or insulin dosing regiment  5. Signs of low blood sugar include sweating , heart racing, dizziness and weakness. Check your blood sugar if you have any of these symptoms. Medications-  1. Stop Metformin 500 mg twice daily  2. Continue Farxiga 10 mg daily   3. Continue Omeprazole 20 mg daily   4. Continue Ozempic 1.0 weekly   5.  Follow up in 3 months Per alie Betancourt patient with 4,000 units heparin  Keep heparin pump setting @ 19/1 units/kg/hour   Recheck APTT  in 6 hours @ 10:30 AM       Watson Rojas  01/03/23 0526

## 2023-01-30 RX ORDER — PRAVASTATIN SODIUM 10 MG
TABLET ORAL
Qty: 90 TABLET | Refills: 1 | Status: SHIPPED | OUTPATIENT
Start: 2023-01-30

## 2023-01-31 ENCOUNTER — HOSPITAL ENCOUNTER (INPATIENT)
Age: 53
LOS: 2 days | Discharge: HOME | DRG: 871 | End: 2023-02-03
Attending: STUDENT IN AN ORGANIZED HEALTH CARE EDUCATION/TRAINING PROGRAM | Admitting: INTERNAL MEDICINE
Payer: COMMERCIAL

## 2023-01-31 ENCOUNTER — APPOINTMENT (OUTPATIENT)
Dept: GENERAL RADIOLOGY | Age: 53
DRG: 871 | End: 2023-01-31
Payer: COMMERCIAL

## 2023-01-31 DIAGNOSIS — R07.9 CHEST PAIN, UNSPECIFIED TYPE: Primary | ICD-10-CM

## 2023-01-31 DIAGNOSIS — R04.2 COUGH WITH HEMOPTYSIS: ICD-10-CM

## 2023-01-31 PROCEDURE — 96374 THER/PROPH/DIAG INJ IV PUSH: CPT

## 2023-01-31 PROCEDURE — 93005 ELECTROCARDIOGRAM TRACING: CPT | Performed by: STUDENT IN AN ORGANIZED HEALTH CARE EDUCATION/TRAINING PROGRAM

## 2023-01-31 PROCEDURE — 84145 PROCALCITONIN (PCT): CPT

## 2023-01-31 PROCEDURE — 96361 HYDRATE IV INFUSION ADD-ON: CPT

## 2023-01-31 PROCEDURE — 36415 COLL VENOUS BLD VENIPUNCTURE: CPT

## 2023-01-31 PROCEDURE — 83880 ASSAY OF NATRIURETIC PEPTIDE: CPT

## 2023-01-31 PROCEDURE — 85025 COMPLETE CBC W/AUTO DIFF WBC: CPT

## 2023-01-31 PROCEDURE — 96375 TX/PRO/DX INJ NEW DRUG ADDON: CPT

## 2023-01-31 PROCEDURE — 84443 ASSAY THYROID STIM HORMONE: CPT

## 2023-01-31 PROCEDURE — 99285 EMERGENCY DEPT VISIT HI MDM: CPT

## 2023-01-31 PROCEDURE — 71045 X-RAY EXAM CHEST 1 VIEW: CPT

## 2023-01-31 PROCEDURE — 84484 ASSAY OF TROPONIN QUANT: CPT

## 2023-01-31 PROCEDURE — 83735 ASSAY OF MAGNESIUM: CPT

## 2023-01-31 PROCEDURE — 80053 COMPREHEN METABOLIC PANEL: CPT

## 2023-01-31 RX ORDER — ACETAMINOPHEN 500 MG
1000 TABLET ORAL ONCE
Status: COMPLETED | OUTPATIENT
Start: 2023-01-31 | End: 2023-02-01

## 2023-01-31 ASSESSMENT — PAIN - FUNCTIONAL ASSESSMENT: PAIN_FUNCTIONAL_ASSESSMENT: 0-10

## 2023-01-31 ASSESSMENT — PAIN SCALES - GENERAL: PAINLEVEL_OUTOF10: 5

## 2023-01-31 ASSESSMENT — PAIN DESCRIPTION - PAIN TYPE: TYPE: ACUTE PAIN

## 2023-01-31 ASSESSMENT — PAIN DESCRIPTION - LOCATION: LOCATION: CHEST

## 2023-02-01 ENCOUNTER — APPOINTMENT (OUTPATIENT)
Dept: CT IMAGING | Age: 53
DRG: 871 | End: 2023-02-01
Payer: COMMERCIAL

## 2023-02-01 PROBLEM — R04.2 COUGH WITH HEMOPTYSIS: Status: ACTIVE | Noted: 2023-02-01

## 2023-02-01 PROBLEM — J18.9 PNEUMONIA OF RIGHT UPPER LOBE DUE TO INFECTIOUS ORGANISM: Status: ACTIVE | Noted: 2023-02-01

## 2023-02-01 LAB
ABO/RH: NORMAL
ALBUMIN SERPL-MCNC: 3.8 G/DL (ref 3.5–4.6)
ALP BLD-CCNC: 85 U/L (ref 35–104)
ALT SERPL-CCNC: 15 U/L (ref 0–41)
ANION GAP SERPL CALCULATED.3IONS-SCNC: 11 MEQ/L (ref 9–15)
ANION GAP SERPL CALCULATED.3IONS-SCNC: 12 MEQ/L (ref 9–15)
ANTIBODY SCREEN: NORMAL
APTT: 29.3 SEC (ref 24.4–36.8)
AST SERPL-CCNC: 18 U/L (ref 0–40)
BASOPHILS ABSOLUTE: 0 K/UL (ref 0–0.2)
BASOPHILS ABSOLUTE: 0 K/UL (ref 0–0.2)
BASOPHILS RELATIVE PERCENT: 0.1 %
BASOPHILS RELATIVE PERCENT: 0.2 %
BILIRUB SERPL-MCNC: 0.7 MG/DL (ref 0.2–0.7)
BUN BLDV-MCNC: 14 MG/DL (ref 6–20)
BUN BLDV-MCNC: 14 MG/DL (ref 6–20)
CALCIUM SERPL-MCNC: 8.5 MG/DL (ref 8.5–9.9)
CALCIUM SERPL-MCNC: 8.7 MG/DL (ref 8.5–9.9)
CHLORIDE BLD-SCNC: 103 MEQ/L (ref 95–107)
CHLORIDE BLD-SCNC: 99 MEQ/L (ref 95–107)
CO2: 19 MEQ/L (ref 20–31)
CO2: 22 MEQ/L (ref 20–31)
CREAT SERPL-MCNC: 0.59 MG/DL (ref 0.7–1.2)
CREAT SERPL-MCNC: 0.71 MG/DL (ref 0.7–1.2)
D DIMER: 0.35 MG/L FEU (ref 0–0.5)
EOSINOPHILS ABSOLUTE: 0 K/UL (ref 0–0.7)
EOSINOPHILS ABSOLUTE: 0 K/UL (ref 0–0.7)
EOSINOPHILS RELATIVE PERCENT: 0.1 %
EOSINOPHILS RELATIVE PERCENT: 0.2 %
GFR SERPL CREATININE-BSD FRML MDRD: >60 ML/MIN/{1.73_M2}
GLOBULIN: 3.7 G/DL (ref 2.3–3.5)
GLUCOSE BLD-MCNC: 186 MG/DL (ref 70–99)
GLUCOSE BLD-MCNC: 186 MG/DL (ref 70–99)
GLUCOSE BLD-MCNC: 201 MG/DL (ref 70–99)
GLUCOSE BLD-MCNC: 205 MG/DL (ref 70–99)
HCT VFR BLD CALC: 39 % (ref 42–52)
HCT VFR BLD CALC: 41.6 % (ref 42–52)
HEMOGLOBIN: 13.2 G/DL (ref 14–18)
HEMOGLOBIN: 14 G/DL (ref 14–18)
INFLUENZA A BY PCR: NEGATIVE
INFLUENZA B BY PCR: NEGATIVE
INR BLD: 1.2
LACTIC ACID, SEPSIS: 0.7 MMOL/L (ref 0.5–1.9)
LACTIC ACID, SEPSIS: 1 MMOL/L (ref 0.5–1.9)
LYMPHOCYTES ABSOLUTE: 0.3 K/UL (ref 1–4.8)
LYMPHOCYTES ABSOLUTE: 0.3 K/UL (ref 1–4.8)
LYMPHOCYTES RELATIVE PERCENT: 3.7 %
LYMPHOCYTES RELATIVE PERCENT: 4.3 %
MAGNESIUM: 2 MG/DL (ref 1.7–2.4)
MCH RBC QN AUTO: 29.6 PG (ref 27–31.3)
MCH RBC QN AUTO: 29.7 PG (ref 27–31.3)
MCHC RBC AUTO-ENTMCNC: 33.7 % (ref 33–37)
MCHC RBC AUTO-ENTMCNC: 33.9 % (ref 33–37)
MCV RBC AUTO: 87.4 FL (ref 79–92.2)
MCV RBC AUTO: 88 FL (ref 79–92.2)
MONOCYTES ABSOLUTE: 0.2 K/UL (ref 0.2–0.8)
MONOCYTES ABSOLUTE: 0.7 K/UL (ref 0.2–0.8)
MONOCYTES RELATIVE PERCENT: 3 %
MONOCYTES RELATIVE PERCENT: 8.6 %
NEUTROPHILS ABSOLUTE: 7.3 K/UL (ref 1.4–6.5)
NEUTROPHILS ABSOLUTE: 7.4 K/UL (ref 1.4–6.5)
NEUTROPHILS RELATIVE PERCENT: 87.3 %
NEUTROPHILS RELATIVE PERCENT: 92.5 %
PDW BLD-RTO: 14.3 % (ref 11.5–14.5)
PDW BLD-RTO: 14.4 % (ref 11.5–14.5)
PERFORMED ON: ABNORMAL
PLATELET # BLD: 162 K/UL (ref 130–400)
PLATELET # BLD: 187 K/UL (ref 130–400)
POC CREATININE: 0.7 MG/DL (ref 0.8–1.3)
POC SAMPLE TYPE: ABNORMAL
POTASSIUM REFLEX MAGNESIUM: 4.7 MEQ/L (ref 3.4–4.9)
POTASSIUM SERPL-SCNC: 4.3 MEQ/L (ref 3.4–4.9)
PRO-BNP: 39 PG/ML
PROCALCITONIN: 0.54 NG/ML (ref 0–0.15)
PROCALCITONIN: 0.56 NG/ML (ref 0–0.15)
PROTHROMBIN TIME: 14.9 SEC (ref 12.3–14.9)
RBC # BLD: 4.46 M/UL (ref 4.7–6.1)
RBC # BLD: 4.72 M/UL (ref 4.7–6.1)
REASON FOR REJECTION: NORMAL
REASON FOR REJECTION: NORMAL
REJECTED TEST: NORMAL
REJECTED TEST: NORMAL
SARS-COV-2, NAAT: NOT DETECTED
SODIUM BLD-SCNC: 130 MEQ/L (ref 135–144)
SODIUM BLD-SCNC: 136 MEQ/L (ref 135–144)
TOTAL PROTEIN: 7.5 G/DL (ref 6.3–8)
TROPONIN: <0.01 NG/ML (ref 0–0.01)
TSH SERPL DL<=0.05 MIU/L-ACNC: 0.38 UIU/ML (ref 0.44–3.86)
WBC # BLD: 8 K/UL (ref 4.8–10.8)
WBC # BLD: 8.4 K/UL (ref 4.8–10.8)

## 2023-02-01 PROCEDURE — 87635 SARS-COV-2 COVID-19 AMP PRB: CPT

## 2023-02-01 PROCEDURE — 99222 1ST HOSP IP/OBS MODERATE 55: CPT | Performed by: INTERNAL MEDICINE

## 2023-02-01 PROCEDURE — 94761 N-INVAS EAR/PLS OXIMETRY MLT: CPT

## 2023-02-01 PROCEDURE — 2500000003 HC RX 250 WO HCPCS: Performed by: STUDENT IN AN ORGANIZED HEALTH CARE EDUCATION/TRAINING PROGRAM

## 2023-02-01 PROCEDURE — 83605 ASSAY OF LACTIC ACID: CPT

## 2023-02-01 PROCEDURE — 86900 BLOOD TYPING SEROLOGIC ABO: CPT

## 2023-02-01 PROCEDURE — 87040 BLOOD CULTURE FOR BACTERIA: CPT

## 2023-02-01 PROCEDURE — 6360000002 HC RX W HCPCS: Performed by: STUDENT IN AN ORGANIZED HEALTH CARE EDUCATION/TRAINING PROGRAM

## 2023-02-01 PROCEDURE — 71275 CT ANGIOGRAPHY CHEST: CPT

## 2023-02-01 PROCEDURE — 2580000003 HC RX 258: Performed by: INTERNAL MEDICINE

## 2023-02-01 PROCEDURE — 36415 COLL VENOUS BLD VENIPUNCTURE: CPT

## 2023-02-01 PROCEDURE — 84145 PROCALCITONIN (PCT): CPT

## 2023-02-01 PROCEDURE — 94664 DEMO&/EVAL PT USE INHALER: CPT

## 2023-02-01 PROCEDURE — 87502 INFLUENZA DNA AMP PROBE: CPT

## 2023-02-01 PROCEDURE — 6360000004 HC RX CONTRAST MEDICATION: Performed by: STUDENT IN AN ORGANIZED HEALTH CARE EDUCATION/TRAINING PROGRAM

## 2023-02-01 PROCEDURE — 1210000000 HC MED SURG R&B

## 2023-02-01 PROCEDURE — 85610 PROTHROMBIN TIME: CPT

## 2023-02-01 PROCEDURE — 85379 FIBRIN DEGRADATION QUANT: CPT

## 2023-02-01 PROCEDURE — 86850 RBC ANTIBODY SCREEN: CPT

## 2023-02-01 PROCEDURE — 80048 BASIC METABOLIC PNL TOTAL CA: CPT

## 2023-02-01 PROCEDURE — 86901 BLOOD TYPING SEROLOGIC RH(D): CPT

## 2023-02-01 PROCEDURE — 87449 NOS EACH ORGANISM AG IA: CPT

## 2023-02-01 PROCEDURE — 87899 AGENT NOS ASSAY W/OPTIC: CPT

## 2023-02-01 PROCEDURE — 6360000002 HC RX W HCPCS: Performed by: INTERNAL MEDICINE

## 2023-02-01 PROCEDURE — 6370000000 HC RX 637 (ALT 250 FOR IP): Performed by: STUDENT IN AN ORGANIZED HEALTH CARE EDUCATION/TRAINING PROGRAM

## 2023-02-01 PROCEDURE — XW033N5 INTRODUCTION OF MEROPENEM-VABORBACTAM ANTI-INFECTIVE INTO PERIPHERAL VEIN, PERCUTANEOUS APPROACH, NEW TECHNOLOGY GROUP 5: ICD-10-PCS | Performed by: INTERNAL MEDICINE

## 2023-02-01 PROCEDURE — 2500000003 HC RX 250 WO HCPCS: Performed by: INTERNAL MEDICINE

## 2023-02-01 PROCEDURE — 86480 TB TEST CELL IMMUN MEASURE: CPT

## 2023-02-01 PROCEDURE — 85730 THROMBOPLASTIN TIME PARTIAL: CPT

## 2023-02-01 PROCEDURE — 85025 COMPLETE CBC W/AUTO DIFF WBC: CPT

## 2023-02-01 PROCEDURE — A4216 STERILE WATER/SALINE, 10 ML: HCPCS | Performed by: STUDENT IN AN ORGANIZED HEALTH CARE EDUCATION/TRAINING PROGRAM

## 2023-02-01 PROCEDURE — 2580000003 HC RX 258: Performed by: STUDENT IN AN ORGANIZED HEALTH CARE EDUCATION/TRAINING PROGRAM

## 2023-02-01 RX ORDER — DEXTROSE MONOHYDRATE 100 MG/ML
INJECTION, SOLUTION INTRAVENOUS CONTINUOUS PRN
Status: DISCONTINUED | OUTPATIENT
Start: 2023-02-01 | End: 2023-02-03 | Stop reason: HOSPADM

## 2023-02-01 RX ORDER — SODIUM CHLORIDE 0.9 % (FLUSH) 0.9 %
5-40 SYRINGE (ML) INJECTION EVERY 12 HOURS SCHEDULED
Status: DISCONTINUED | OUTPATIENT
Start: 2023-02-01 | End: 2023-02-03 | Stop reason: HOSPADM

## 2023-02-01 RX ORDER — ACETAMINOPHEN 325 MG/1
650 TABLET ORAL EVERY 6 HOURS PRN
Status: DISCONTINUED | OUTPATIENT
Start: 2023-02-01 | End: 2023-02-03 | Stop reason: HOSPADM

## 2023-02-01 RX ORDER — ONDANSETRON 2 MG/ML
4 INJECTION INTRAMUSCULAR; INTRAVENOUS EVERY 6 HOURS PRN
Status: DISCONTINUED | OUTPATIENT
Start: 2023-02-01 | End: 2023-02-03 | Stop reason: HOSPADM

## 2023-02-01 RX ORDER — IPRATROPIUM BROMIDE AND ALBUTEROL SULFATE 2.5; .5 MG/3ML; MG/3ML
1 SOLUTION RESPIRATORY (INHALATION) EVERY 4 HOURS PRN
Status: DISCONTINUED | OUTPATIENT
Start: 2023-02-01 | End: 2023-02-03 | Stop reason: HOSPADM

## 2023-02-01 RX ORDER — POLYETHYLENE GLYCOL 3350 17 G/17G
17 POWDER, FOR SOLUTION ORAL DAILY PRN
Status: DISCONTINUED | OUTPATIENT
Start: 2023-02-01 | End: 2023-02-03 | Stop reason: HOSPADM

## 2023-02-01 RX ORDER — DIPHENHYDRAMINE HYDROCHLORIDE 50 MG/ML
25 INJECTION INTRAMUSCULAR; INTRAVENOUS ONCE
Status: COMPLETED | OUTPATIENT
Start: 2023-02-01 | End: 2023-02-01

## 2023-02-01 RX ORDER — INSULIN LISPRO 100 [IU]/ML
0-4 INJECTION, SOLUTION INTRAVENOUS; SUBCUTANEOUS NIGHTLY
Status: DISCONTINUED | OUTPATIENT
Start: 2023-02-01 | End: 2023-02-03 | Stop reason: HOSPADM

## 2023-02-01 RX ORDER — SODIUM CHLORIDE 9 MG/ML
INJECTION, SOLUTION INTRAVENOUS PRN
Status: DISCONTINUED | OUTPATIENT
Start: 2023-02-01 | End: 2023-02-03 | Stop reason: HOSPADM

## 2023-02-01 RX ORDER — SODIUM CHLORIDE 0.9 % (FLUSH) 0.9 %
5-40 SYRINGE (ML) INJECTION PRN
Status: DISCONTINUED | OUTPATIENT
Start: 2023-02-01 | End: 2023-02-03 | Stop reason: HOSPADM

## 2023-02-01 RX ORDER — INSULIN LISPRO 100 [IU]/ML
0-8 INJECTION, SOLUTION INTRAVENOUS; SUBCUTANEOUS
Status: DISCONTINUED | OUTPATIENT
Start: 2023-02-01 | End: 2023-02-03 | Stop reason: HOSPADM

## 2023-02-01 RX ORDER — METHYLPREDNISOLONE SODIUM SUCCINATE 125 MG/2ML
125 INJECTION, POWDER, LYOPHILIZED, FOR SOLUTION INTRAMUSCULAR; INTRAVENOUS ONCE
Status: COMPLETED | OUTPATIENT
Start: 2023-02-01 | End: 2023-02-01

## 2023-02-01 RX ORDER — ACETAMINOPHEN 650 MG/1
650 SUPPOSITORY RECTAL EVERY 6 HOURS PRN
Status: DISCONTINUED | OUTPATIENT
Start: 2023-02-01 | End: 2023-02-03 | Stop reason: HOSPADM

## 2023-02-01 RX ORDER — ONDANSETRON 4 MG/1
4 TABLET, ORALLY DISINTEGRATING ORAL EVERY 8 HOURS PRN
Status: DISCONTINUED | OUTPATIENT
Start: 2023-02-01 | End: 2023-02-03 | Stop reason: HOSPADM

## 2023-02-01 RX ADMIN — CEFTRIAXONE SODIUM 1000 MG: 1 INJECTION, POWDER, FOR SOLUTION INTRAMUSCULAR; INTRAVENOUS at 17:22

## 2023-02-01 RX ADMIN — AZITHROMYCIN DIHYDRATE 500 MG: 500 INJECTION, POWDER, LYOPHILIZED, FOR SOLUTION INTRAVENOUS at 15:59

## 2023-02-01 RX ADMIN — SODIUM CHLORIDE, PRESERVATIVE FREE 10 ML: 5 INJECTION INTRAVENOUS at 21:26

## 2023-02-01 RX ADMIN — DIPHENHYDRAMINE HYDROCHLORIDE 25 MG: 50 INJECTION, SOLUTION INTRAMUSCULAR; INTRAVENOUS at 01:48

## 2023-02-01 RX ADMIN — SODIUM CHLORIDE, PRESERVATIVE FREE 10 ML: 5 INJECTION INTRAVENOUS at 08:45

## 2023-02-01 RX ADMIN — METHYLPREDNISOLONE SODIUM SUCCINATE 125 MG: 125 INJECTION, POWDER, FOR SOLUTION INTRAMUSCULAR; INTRAVENOUS at 01:49

## 2023-02-01 RX ADMIN — VANCOMYCIN HYDROCHLORIDE 1500 MG: 1.5 INJECTION, POWDER, LYOPHILIZED, FOR SOLUTION INTRAVENOUS at 02:32

## 2023-02-01 RX ADMIN — IOPAMIDOL 75 ML: 612 INJECTION, SOLUTION INTRAVENOUS at 00:56

## 2023-02-01 RX ADMIN — SODIUM CHLORIDE 2500 ML: 9 INJECTION, SOLUTION INTRAVENOUS at 00:20

## 2023-02-01 RX ADMIN — PIPERACILLIN AND TAZOBACTAM 3375 MG: 3; .375 INJECTION, POWDER, FOR SOLUTION INTRAVENOUS at 01:27

## 2023-02-01 RX ADMIN — MEROPENEM 1000 MG: 1 INJECTION, POWDER, FOR SOLUTION INTRAVENOUS at 08:44

## 2023-02-01 RX ADMIN — FAMOTIDINE 20 MG: 10 INJECTION, SOLUTION INTRAVENOUS at 01:47

## 2023-02-01 RX ADMIN — ACETAMINOPHEN 1000 MG: 500 TABLET ORAL at 00:19

## 2023-02-01 RX ADMIN — TUBERCULIN PURIFIED PROTEIN DERIVATIVE 5 UNITS: 5 INJECTION, SOLUTION INTRADERMAL at 14:29

## 2023-02-01 ASSESSMENT — LIFESTYLE VARIABLES
HOW OFTEN DO YOU HAVE A DRINK CONTAINING ALCOHOL: MONTHLY OR LESS
HOW MANY STANDARD DRINKS CONTAINING ALCOHOL DO YOU HAVE ON A TYPICAL DAY: 1 OR 2

## 2023-02-01 ASSESSMENT — ENCOUNTER SYMPTOMS
SHORTNESS OF BREATH: 1
BACK PAIN: 0
DIARRHEA: 0
EYE PAIN: 0
ABDOMINAL PAIN: 0
VOMITING: 0
COUGH: 1
BLOOD IN STOOL: 0
SORE THROAT: 0
CHEST TIGHTNESS: 1
NAUSEA: 1
CONSTIPATION: 0
RHINORRHEA: 0

## 2023-02-01 ASSESSMENT — PAIN SCALES - GENERAL: PAINLEVEL_OUTOF10: 0

## 2023-02-01 NOTE — ED PROVIDER NOTES
[address] [facility]  eMERGENCY dEPARTMENT eNCOUnter      Pt Name: Zander Mijares  MRN: [id number]  [place] 1970  Date of evaluation: 1/31/2023  Provider: Johana Hdz MD      HISTORY OF PRESENT ILLNESS      Chief Complaint   Patient presents with    Chest Pain     X2 Days    Cough     With blood x 1 month       The history is provided by the Patient. Zander Mijares is a 48 y.o. male with a PMH clinically significant for DM II, Obesity, Diverticulosis, GERD presenting to the ED via PV c/o chest pain, cough productive of yellow sputum, hemoptysis, generalized fatigue/weakness, lightheadedness, dizziness and worsening shortness of breath. Patient states initial onset of symptoms seeming to be 1 month ago. Had nausea, vomiting and abdominal discomfort for about 5 days. Also had cough at that time. States he seemed to improve with NyQuil and rest.  Still had a persistent cough throughout the rest of the month though. Had worsening of the cough over the past several days with hemoptysis developing yesterday. Was initially mildly blood-streaked, but became more heavily bloody over the past day. Has also become more lightheaded and dizzy with fevers and chills. Complains of right-sided chest pain that is aching, sharp, stabbing and worse with deep inspiration and cough. Right-sided, nonradiating. Denies any associated abdominal pain, nausea, vomiting, diarrhea, hematochezia, urinary symptoms, BLE edema/pain. Denies any known history of blood clots. Also denies any known history of CAD or arrhythmias. Denies history of tobacco smoking. Denies regular EtOH abuse. States he was otherwise feeling well over the past month. Was born in [facility].  Not aware of prior diagnosis of tuberculosis. States symptoms worse with exertion. Improved with nothing currently. States no history of similar previous episodes. Per Chart Review: PMH as noted above obtained via outpatient chart review.      REVIEW OF SYSTEMS       Review of Systems   Constitutional:  Positive for activity change, chills, diaphoresis, fatigue and fever. Negative for appetite change. HENT:  Negative for congestion, rhinorrhea and sore throat. Eyes:  Negative for pain and visual disturbance. Respiratory:  Positive for cough, chest tightness and shortness of breath. Cardiovascular:  Positive for chest pain. Negative for palpitations and leg swelling. Gastrointestinal:  Positive for nausea. Negative for abdominal pain, blood in stool, constipation, diarrhea and vomiting. Genitourinary:  Negative for dysuria. Musculoskeletal:  Positive for myalgias. Negative for arthralgias, back pain and neck pain. Skin:  Negative for rash. Neurological:  Positive for dizziness and light-headedness. Negative for weakness, numbness and headaches. PAST MEDICAL HISTORY     Past Medical History:   Diagnosis Date    Anxiety     BPH without urinary obstruction     Bunion of great toe of right foot     [facility] 1125    Diabetic neuropathy associated with type 2 diabetes mellitus (HCC)     EMG Dr Gigi Carroll    Diverticulitis     Erectile dysfunction     Dr Renetta De La Cruz     Folic acid deficiency 8125    GERD (gastroesophageal reflux disease)     Low HDL (under 40)     Non-compliance with treatment 2015    Obesity (BMI 30-39. 9)        SURGICAL HISTORY       Past Surgical History:   Procedure Laterality Date    ABDOMEN SURGERY  2009    for diverticulitis    ABDOMINAL HERNIA REPAIR  2010    COLON SURGERY  2008    COLONOSCOPY      COLONOSCOPY N/A 9/1/2020    COLORECTAL CANCER SCREENING, HIGH RISK performed by Samia Carcamo MD at [facility], COLON, DIAGNOSTIC      THUMB AMPUTATION Right     due to accident       FAMILY HISTORY       Family History   Problem Relation Age of Onset    Diabetes Father     Hypertension Mother     Colon Cancer Neg Hx        SOCIAL HISTORY       Social History Socioeconomic History    Marital status:      Spouse name: None    Number of children: 5    Years of education: None    Highest education level: None   Occupational History    Occupation: ,      Employer: [employer]   Tobacco Use    Smoking status: Never    Smokeless tobacco: Never   Vaping Use    Vaping Use: Never used   Substance and Sexual Activity    Alcohol use: Yes     Comment: rare/social    Drug use: No    Sexual activity: Yes     Partners: Female   Social History Narrative    Born in [facility], father from [place], one sister and one brother    , spouse works in a [employer], office job, [employer] at [employer], 3 daughters and 2 sons    Lives in a house in [place], with spouse and 4 children    Hobbies: [employer], community service, music (keyboard)             Social Determinants of [address] Strain: Low Risk     Difficulty of Paying Living Expenses: Not hard at [address]: No Food Insecurity    Worried About [address] in the Last Year: Never true    [address] in the Last Year: Never true   Transportation Needs: No Transportation Needs    Lack of Transportation (Medical): No    Lack of Transportation (Non-Medical):  No       CURRENT MEDICATIONS       Discharge Medication List as of 2/3/2023  8:38 PM        CONTINUE these medications which have NOT CHANGED    Details   pravastatin (PRAVACHOL) 10 MG tablet TAKE 1 TABLET BY MOUTH IN THE EVENING, Disp-90 tablet, R-1Normal      Semaglutide, 1 MG/DOSE, (OZEMPIC, 1 MG/DOSE,) 4 MG/3ML SOPN Inject 1 mg into the skin once a week, Disp-9 mL, R-1Normal      empagliflozin (JARDIANCE) 10 MG tablet Take 1 tablet by mouth daily, Disp-30 tablet, R-3Normal      Continuous Blood Gluc Sensor (FREESTYLE AMANDA 14 DAY SENSOR) MISC USE AS DIRECTED EVERY 14 DAYS, Disp-6 each, R-5Normal      omeprazole (PRILOSEC) 20 MG delayed release capsule TAKE 1 CAPSULE BY MOUTH EVERY DAY, Disp-90 capsule, R-2Normal      lisinopril (PRINIVIL;ZESTRIL) 5 MG tablet TAKE 1 TABLET BY MOUTH EVERY DAY, Disp-90 tablet, R-5Normal      tadalafil (CIALIS) 20 MG tablet Take 1 tablet by mouth daily as needed for Erectile Dysfunction, Disp-90 tablet, [id number]      folic acid (FOLVITE) 1 MG tablet TAKE 1 TABLET BY MOUTH EVERY DAY, Disp-90 tablet, R-4Normal      Continuous Blood Gluc  (HealthSpotYLE AMANDA 14 DAY READER) ABIGAIL USE AS DIRECTED BEFORE MEALS AND NIGHTLY, Disp-1 Device, R-0Normal      Multiple Vitamin (MULTI-VITAMIN) TABS Take 1 tablet by mouth daily. ALLERGIES     Zosyn [piperacillin sod-tazobactam so]      PHYSICAL EXAM       ED Triage Vitals [01/31/23 2326]   BP Temp Temp Source Heart Rate Resp SpO2 Height Weight   137/85 (!) 102.2 °F (39 °C) Oral (!) 144 22 95 % 6' 2\" (1.88 m) 227 lb (103 kg)       Physical Exam  Vitals and nursing note reviewed. Constitutional:       General: He is not in acute distress. Appearance: He is obese. He is ill-appearing and toxic-appearing. He is not diaphoretic. HENT:      Head: Normocephalic and atraumatic. Mouth/Throat:      Mouth: Mucous membranes are dry. Pharynx: Oropharynx is clear. No oropharyngeal exudate or posterior oropharyngeal erythema. Eyes:      Extraocular Movements: Extraocular movements intact. Pupils: Pupils are equal, round, and reactive to light. Cardiovascular:      Rate and Rhythm: Regular rhythm. Tachycardia present. Pulses: Normal pulses. Heart sounds: Normal heart sounds. Pulmonary:      Effort: Tachypnea and accessory muscle usage present. No prolonged expiration or respiratory distress. Breath sounds: Normal breath sounds. No decreased air movement. Chest:      Chest wall: Tenderness present. No mass, deformity or crepitus. Abdominal:      General: There is no distension. Palpations: Abdomen is soft. Tenderness:  There is no abdominal tenderness. There is no right CVA tenderness, left CVA tenderness, guarding or rebound.   Musculoskeletal:         General: No tenderness.      Cervical back: Normal range of motion and neck supple. No rigidity or tenderness.      Right lower leg: No edema.      Left lower leg: No edema.   Skin:     General: Skin is warm and dry.      Capillary Refill: Capillary refill takes less than 2 seconds.   Neurological:      General: No focal deficit present.      Mental Status: He is alert and oriented to person, place, and time.      Sensory: No sensory deficit.      Motor: No weakness.   Psychiatric:         Mood and Affect: Mood normal.         Behavior: Behavior normal.       MDM:   Chart Reviewed: PMH and additional information as noted in HPI obtained from chart review    Vitals:    Vitals:    02/02/23 2059 02/03/23 0835 02/03/23 1506 02/03/23 2030   BP: 104/67 117/66 108/67 118/72   Pulse: 85 77 76 65   Resp: 18 18 18 18   Temp: 98.6 °F (37 °C) 98 °F (36.7 °C) 97.7 °F (36.5 °C) 98.1 °F (36.7 °C)   TempSrc: Oral Oral Oral Oral   SpO2: 96% 99% 98% 100%   Weight:       Height:           PROCEDURES:  Unless otherwise noted below, none  Procedures    LABS:  Labs Reviewed   CBC WITH AUTO DIFFERENTIAL - Abnormal; Notable for the following components:       Result Value    Hematocrit 41.6 (*)     Neutrophils Absolute 7.3 (*)     Lymphocytes Absolute 0.3 (*)     All other components within normal limits   PROCALCITONIN - Abnormal; Notable for the following components:    Procalcitonin 0.54 (*)     All other components within normal limits   COMPREHENSIVE METABOLIC PANEL - Abnormal; Notable for the following components:    Sodium 130 (*)     CO2 19 (*)     Glucose 186 (*)     Creatinine 0.59 (*)     Globulin 3.7 (*)     All other components within normal limits   TSH - Abnormal; Notable for the following components:    TSH 0.378 (*)     All other components within normal limits   BASIC METABOLIC PANEL W/ REFLEX TO MG FOR LOW K  - Abnormal; Notable for the following components:    Glucose 201 (*)     All other components within normal limits   PROCALCITONIN - Abnormal; Notable for the following components:    Procalcitonin 0.56 (*)     All other components within normal limits   CBC WITH AUTO DIFFERENTIAL - Abnormal; Notable for the following components:    RBC 4.46 (*)     Hemoglobin 13.2 (*)     Hematocrit 39.0 (*)     Neutrophils Absolute 7.4 (*)     Lymphocytes Absolute 0.3 (*)     All other components within normal limits   BASIC METABOLIC PANEL W/ REFLEX TO MG FOR LOW K - Abnormal; Notable for the following components:    Glucose 124 (*)     BUN 21 (*)     Creatinine 0.59 (*)     All other components within normal limits   PROCALCITONIN - Abnormal; Notable for the following components:    Procalcitonin 0.41 (*)     All other components within normal limits   CBC WITH AUTO DIFFERENTIAL - Abnormal; Notable for the following components:    RBC 4.37 (*)     Hemoglobin 12.7 (*)     Hematocrit 37.6 (*)     Neutrophils Absolute 7.6 (*)     Lymphocytes Absolute 0.9 (*)     Monocytes Absolute 1.1 (*)     All other components within normal limits   BASIC METABOLIC PANEL W/ REFLEX TO MG FOR LOW K - Abnormal; Notable for the following components:    Glucose 153 (*)     BUN 22 (*)     Creatinine 0.58 (*)     Calcium 8.2 (*)     All other components within normal limits   PROCALCITONIN - Abnormal; Notable for the following components:    Procalcitonin 0.24 (*)     All other components within normal limits   CBC WITH AUTO DIFFERENTIAL - Abnormal; Notable for the following components:    RBC 4.25 (*)     Hemoglobin 12.3 (*)     Hematocrit 36.5 (*)     All other components within normal limits   POCT VENOUS - Abnormal; Notable for the following components:    POC Creatinine 0.7 (*)     All other components within normal limits   POCT GLUCOSE - Abnormal; Notable for the following components:    POC Glucose 186 (*)     All other components within normal limits   POCT GLUCOSE - Abnormal; Notable for the following components:    POC Glucose 205 (*)     All other components within normal limits   POCT GLUCOSE - Abnormal; Notable for the following components:    POC Glucose 114 (*)     All other components within normal limits   POCT GLUCOSE - Abnormal; Notable for the following components:    POC Glucose 215 (*)     All other components within normal limits   POCT GLUCOSE - Abnormal; Notable for the following components:    POC Glucose 139 (*)     All other components within normal limits   POCT GLUCOSE - Abnormal; Notable for the following components:    POC Glucose 259 (*)     All other components within normal limits   POCT GLUCOSE - Abnormal; Notable for the following components:    POC Glucose 135 (*)     All other components within normal limits   POCT GLUCOSE - Abnormal; Notable for the following components:    POC Glucose 209 (*)     All other components within normal limits   POCT GLUCOSE - Abnormal; Notable for the following components:    POC Glucose 177 (*)     All other components within normal limits   POCT GLUCOSE - Abnormal; Notable for the following components:    POC Glucose 187 (*)     All other components within normal limits   CULTURE, BLOOD 1    Narrative:     ORDER#: [id number]                          ORDERED BY: LESTER GARRETT  SOURCE: Blood                              COLLECTED:  02/01/23 00:04  ANTIBIOTICS AT TRAN.:                      RECEIVED :  02/01/23 00:04   CULTURE, BLOOD 2    Narrative:     ORDER#: [id number]                          ORDERED BY: Shahid Stahl  SOURCE: Blood                              COLLECTED:  02/01/23 00:10  ANTIBIOTICS AT TRAN.:                      RECEIVED :  02/01/23 00:37   RAPID INFLUENZA A/B ANTIGENS   COVID-19, RAPID   MRSA DNA PROBE, NASAL   CULTURE, RESPIRATORY    Narrative:     ORDER#: [id number]                          ORDERED BY: CADEN ANSARI  SOURCE: Sputum Suctioned COLLECTED:  02/02/23 09:19  ANTIBIOTICS AT TRAN.:                      RECEIVED :  02/02/23 09:19   QUANTIFERON (R) TB GOLD   LEGIONELLA ANTIGEN, URINE   STREP PNEUMONIAE ANTIGEN   CULTURE WITH SMEAR, ACID FAST BACILLIUS   QUANTIFERON (R) TB GOLD   CULTURE WITH SMEAR, ACID FAST BACILLIUS   CULTURE WITH SMEAR, ACID FAST BACILLIUS   LACTATE, SEPSIS   LACTATE, SEPSIS   TROPONIN   MAGNESIUM   D-DIMER, QUANTITATIVE   BRAIN NATRIURETIC PEPTIDE   APTT   PROTIME-INR   SPECIMEN REJECTION    Narrative:     ORDER WAS CANCELLED 02/01/2023 03:46, Rejected: Collected in wrong  tube/container. SPECIMEN REJECTION    Narrative:     ORDER WAS CANCELLED 02/01/2023 16:48, Rejected: Possibly contaminated sent in  bag with leaking urine cup. LACTIC ACID   PROTIME-INR   HIV SCREEN   LACTIC ACID   PROTIME-INR   TYPE AND SCREEN       XR CHEST (2 VW)   Final Result   Masslike opacity of the right upper lobe does not appear significantly   changed when compared to prior radiograph however there now appears to be a   cavitary component. Differential diagnosis includes infectious/inflammatory   etiologies as well as malignancy. CTA CHEST W WO CONTRAST   Final Result   The exam is nondiagnostic for evaluation for pulmonary embolism. Dense   consolidative infiltrate posteriorly within the right upper lobe. Findings   favor an infectious process however underlying mass at this time cannot be   completely excluded. Follow-up to resolution is recommended. XR CHEST PORTABLE   Final Result   Right mid lung airspace disease suggesting localized pneumonitis. No   effusions. RECOMMENDATION:   Careful clinical correlation and follow up recommended. ED Course as of 02/04/23 1003   Tue Jan 31, 2023   2344 EKG 12 Lead  EKG showing sinus tachycardia, rate of 122 bpm.  Normal axis, prolonged QTC at 498. Nonspecific ST-T wave abnormalities, likely rate dependent.  [NA]   Wed Feb 01, 2023   [facility] CONTRAST  Agree with radiology interpretation. Right upper lobe versus right middle lobe consolidation. Poor contrast bolus timing. Unable to evaluate for pulmonary emboli. [NA]   0141 XR CHEST PORTABLE  Right middle versus left lower lobe opacity. [NA]      ED Course User Index  [NA] Lilliam Guerra MD       48 y.o. male with a PMH clinically significant for DM II, Obesity, Diverticulosis, GERD presenting to the ED via PV c/o chest pain, cough productive of yellow sputum, hemoptysis, generalized fatigue/weakness, lightheadedness, dizziness and worsening shortness of breath. Upon initial evaluation, Pt tachycardic, tachypneic, febrile and borderline hypoxic on RA. Generally ill-appearing. PE as noted above. Labs, , EKG,, and Imaging interpreted by myself and as noted above. Given findings, clinical presentation most likely consistent w/ right-sided middle lobe consolidation concerning for bacterial pneumonia with hemoptysis, tachycardia and fever. Patient appearing to be significantly dehydrated. Administered large volume fluid resuscitation. Did obtain blood cultures. Also placed in respiratory and contact precautions given concern for possible TB given hemoptysis, opacity and birth history and [place].  Although considered, no evidence of PE on CTA of the chest.  Patient without evidence of significant blood loss anemia. No evidence of coagulopathy at this time. Initiated patient on broad-spectrum antibiotics. Symptoms significantly improved following meds in the ED. Given findings, stable for further evaluation and management as an inpatient.    Pt was administered   Medications   0.9 % NaCl bolus (2,500 mLs IntraVENous New Bag 2/1/23 0020)   acetaminophen (TYLENOL) tablet 1,000 mg (1,000 mg Oral Given 2/1/23 0019)   iopamidol (ISOVUE-300) 61 % injection 75 mL (75 mLs IntraVENous Given 2/1/23 0056)   piperacillin-tazobactam (ZOSYN) 3,375 mg in sodium chloride 0.9 % 50 mL IVPB (mini-bag) (0 mg IntraVENous Stopped 2/1/23 0145)   vancomycin (VANCOCIN) 1,500 mg in sodium chloride 0.9 % 500 mL IVPB (ADDAVIAL) (0 mg IntraVENous Stopped 2/1/23 0541)   diphenhydrAMINE (BENADRYL) injection 25 mg (25 mg IntraVENous Given 2/1/23 0148)   methylPREDNISolone sodium (SOLU-MEDROL) injection 125 mg (125 mg IntraVENous Given 2/1/23 0149)   famotidine (PEPCID) 20 mg in sodium chloride (PF) 0.9 % 10 mL injection (20 mg IntraVENous Given 2/1/23 0147)   tuberculin injection 5 Units (5 Units IntraDERmal Check PPD (48 HR) 2/3/23 3575)       Plan: Admit to IM for further evaluation and management. Report given to Dr. Lucita Blair. and Patient understanding and amenable to the POC. CRITICAL CARE TIME   Total CriticalCare time was 30 minutes, excluding separately reportable procedures. There was a high probability of clinically significant/life threatening deterioration in the patient's condition which required my urgent intervention. FINAL IMPRESSION      1. Chest pain, unspecified type    2.  Cough with hemoptysis          DISPOSITION/PLAN   DISPOSITION Admitted 02/01/2023 02:16:50 AM      Discharge Medication List as of 2/3/2023  8:38 PM        START taking these medications    Details   guaiFENesin (MUCINEX) 600 MG extended release tablet Take 1 tablet by mouth 2 times daily, Disp-60 tablet, R-0Normal              MD Ankita Milton MD  02/04/23 1007

## 2023-02-01 NOTE — FLOWSHEET NOTE
1025: Patient assessment completed, lungs diminished throughout, minimal cough noted, afebrile. Alert and oriented times 4. Up at bedside working on computer. Denies pain when asked. Maintains isolation precautions Rule out TB. No hemoptysis noted at this time. Call light is in reach, will continue to monitor.  .Electronically signed by Willem Fernandez RN on 2/1/2023 at 10:27 AM

## 2023-02-01 NOTE — ED TRIAGE NOTES
Pt to ED due to chest pain x2 days and a cough with blood x1 month. Pt states the current pain is around 5/10. Pt is alert and oriented x4. Skin is warm, dry, intact.

## 2023-02-01 NOTE — ED NOTES
Pt call light alarming from room. This nurse to bedside. Pt reports itching to scalp. Redness with hives observed on pt skin. IV ATB infusing via IV site. Zosyn ATB stopped at this time. Dr. Shalini Strickland notified of patient condition. New orders received.  Zosyn added to patient allergy list.       Brandy Sherwood RN  02/01/23 1483

## 2023-02-01 NOTE — PROGRESS NOTES
[facility] Respiratory Therapy Evaluation   Current Order:  DUONEB Q4 -PRN      Home Regimen: PRN      Ordering Physician: Melisa Figueroa  Re-evaluation Date:  DONE     Diagnosis: HEMOPTYSIS      Patient Status: Stable     The following MDI Criteria must be met in order to convert aerosol to MDI with spacer. If unable to meet, MDI will be converted to aerosol:  []  Patient able to demonstrate the ability to use MDI effectively  []  Patient alert and cooperative  []  Patient able to take deep breath with 5-10 second hold  []  Medication(s) available in this delivery method   []  Peak flow greater than or equal to 200 ml/min            Current Order Substituted To  (same drug, same frequency)   Aerosol to MDI [] Albuterol Sulfate 0.083% unit dose by aerosol Albuterol Sulfate MDI 2 puffs by inhalation with spacer    [] Levalbuterol 1.25 mg unit dose by aerosol Levalbuterol MDI 2 puffs by inhalation with spacer    [] Levalbuterol 0.63 mg unit dose by aerosol Levalbuterol MDI 2 puffs by inhalation with spacer    [] Ipratropium Bromide 0.02% unit dose by aerosol Ipratropium Bromide MDI 2 puffs by inhalation with spacer    [] Duoneb (Ipratropium + Albuterol) unit dose by aerosol Ipratropium MDI + Albuterol MDI 2 puffs by inhalation w/spacer   MDI to Aerosol [] Albuterol Sulfate MDI Albuterol Sulfate 0.083% unit dose by aerosol    [] Levalbuterol MDI 2 puffs by inhalation Levalbuterol 1.25 mg unit dose by aerosol    [] Ipratropium Bromide MDI by inhalation Ipratropium Bromide 0.02% unit dose by aerosol    [] Combivent (Ipratropium + Albuterol) MDI by inhalation Duoneb (Ipratropium + Albuterol) unit dose by aerosol       Treatment Assessment [Frequency/Schedule]:  Change frequency to: ___NO CHANGE_______________________________________________per Protocol, P&T, MEC      Points 0 1 2 3 4   Pulmonary Status  Non-Smoker  [x]   Smoking history   < 20 pack years  []   Smoking history  ?  20 pack years  []   Pulmonary Disorder  (acute or chronic)  []   Severe or Chronic w/ Exacerbation  []     Surgical Status No [x]   Surgeries     General []   Surgery Lower []   Abdominal Thoracic or []   Upper Abdominal Thoracic with  PulmonaryDisorder  []     Chest X-ray Clear/Not  Ordered     [x]  Chronic Changes  Results Pending  []  Infiltrates, atelectasis, pleural effusion, or edema  []  Infiltrates in more than one lobe []  Infiltrate + Atelectasis, &/or pleural effusion  []    Respiratory Pattern Regular,  RR = 12-20 [x]  Increased,  RR = 21-25 []  CONNELL, irregular,  or RR = 26-30 []  Decreased FEV1  or RR = 31-35 []  Severe SOB, use  of accessory muscles, or RR ? 35  []    Mental Status Alert, oriented,  Cooperative [x]  Confused but Follows commands []  Lethargic or unable to follow commands []  Obtunded  []  Comatose  []    Breath Sounds Clear to  auscultation  []  Decreased unilaterally or  in bases only [x]  Decreased  bilaterally  []  Crackles or intermittent wheezes []  Wheezes []    Cough Strong, Spontan., & nonproductive []  Strong,  spontaneous, &  productive [x]  Weak,  Nonproductive []  Weak, productive or  with wheezes []  No spontaneous  cough or may require suctioning []    Level of Activity Ambulatory [x]  Ambulatory w/ Assist  []  Non-ambulatory []  Paraplegic []  Quadriplegic []    Total    Score:____2___     Triage Score:__5______      Tri       Triage:     1. (>20) Freq: Q3    2. (16-20) Freq: Q4   3. (11-15) Freq: QID & Albuterol Q2 PRN    4. (6-10) Freq: TID & Albuterol Q2 PRN    5. (0-5) Freq Q4prn

## 2023-02-01 NOTE — PROGRESS NOTES
[address] [facility] Pharmacokinetic Monitoring Service - Vancomycin     Jacque Carpenter is a 48 y.o. male starting on vancomycin therapy for cavitary pneumonia vs TB. Pharmacy consulted by Dr Arnulfo Posey for monitoring and adjustment. Target Concentration: Goal AUC/ANNALISA 400-600 mg*hr/L    Additional Antimicrobials: meropenem     Pertinent Laboratory Values: Wt Readings from Last 1 Encounters:   01/31/23 227 lb (103 kg)     Temp Readings from Last 1 Encounters:   02/01/23 98.2 °F (36.8 °C)     Estimated Creatinine Clearance: 185 mL/min (A) (based on SCr of 0.59 mg/dL (L)). Recent Labs     01/31/23  2345   CREATININE 0.59*   WBC 8.4     Procalcitonin:   Recent Labs     01/31/23  2345   PROCAL 0.54*         Pertinent Cultures:  Culture Date Source Results   2/1/23 blood pending   MRSA Nasal Swab: not ordered. Order placed by pharmacy.     Plan:  Dosing recommendations based on Bayesian software  Start vancomycin 1500mg IV x 1 given in ER, followed by vancomycin 1250mg IV q12hr  Anticipated AUC of 455 and trough concentration of 14 at steady state  Renal labs as indicated   Vancomycin concentration in about 48 hours   Pharmacy will continue to monitor patient and adjust therapy as indicated    Thank you for the consult,  Hans Rhodes, [facility]  2/1/2023 4:08 AM

## 2023-02-01 NOTE — PROGRESS NOTES
Arrived to floor from ER, VS obtained, pt ambulated to bed    2/1/23 0589  Admission assessment completed, currently denies pain, speech clear, A&Ox4, pt able to ambulate to bathroom, will continue to monitor

## 2023-02-01 NOTE — CONSULTS
Pulmonary Medicine  Consult Note      Reason for consultation: Right middle lobe pneumonia. Hemoptysis    Consulting physician: Dr. David Nance:      This is 30-year-old male with past medical history significant for anxiety, benign prostatic hypertrophy, diabetes mellitus, diabetic neuropathy, GERD, obesity, folic acid deficiency, he said he has been sick for 4 weeks. He had cough with bloody mucus. He said blood is mixed with mucus and last 2 days. In the emergency room he had a fever up to 102.2. Tachycardia with heart rate of 140, tachypnea. He has been having cough with yellow mucus most of the time since she is sick. He said initially he felt like he recovered from chest cold and he returned to work. His cough somewhat persistent and felt worse in last 2 days. He recently visited [facility] but no known exposure to tuberculosis. He has no history of smoking. He said he is social drinker, not using any recreational drug. He denies having any shortness of breath. No nausea vomiting diarrhea or abdominal pain. He had CT chest.        Past Medical History:        Diagnosis Date    Anxiety     BPH without urinary obstruction     Bunion of great toe of right foot     Controlled diabetes mellitus type 2 with complications ([facility] [address] 0208    Diabetic neuropathy associated with type 2 diabetes mellitus (HCC)     EMG Dr Tequila Blood    Diverticulitis     Erectile dysfunction     Dr Mg Lance acid deficiency 4711    GERD (gastroesophageal reflux disease)     Low HDL (under 40)     Non-compliance with treatment 2015    Obesity (BMI 30-39. 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 He has never used smokeless tobacco. He reports current alcohol use. He reports that he does not use drugs. Family History:       Problem Relation Age of Onset    Diabetes Father     Hypertension Mother     Colon Cancer Neg Hx        Allergies:  Zosyn [piperacillin sod-tazobactam so]        MEDICATIONS during current hospitalization:    Continuous Infusions:   sodium chloride      dextrose         Scheduled Meds:   sodium chloride flush  5-40 mL IntraVENous 2 times per day    vancomycin  1,250 mg IntraVENous Q12H    meropenem  1,000 mg IntraVENous Q8H    insulin lispro  0-8 Units SubCUTAneous TID WC    insulin lispro  0-4 Units SubCUTAneous Nightly    vancomycin (VANCOCIN) intermittent dosing (placeholder)   Other RX Placeholder       PRN Meds:sodium chloride flush, sodium chloride, ondansetron **OR** ondansetron, polyethylene glycol, acetaminophen **OR** acetaminophen, ipratropium-albuterol, glucose, dextrose bolus **OR** dextrose bolus, glucagon (rDNA), dextrose    REVIEW OF SYSTEMS:  As in history of present illness  Other 14 point review of system is negative. PHYSICAL EXAM:    Vitals:  /73   Pulse 85   Temp 98.9 °F (37.2 °C) (Oral)   Resp 18   Ht 6' 2\" (1.88 m)   Wt 227 lb (103 kg)   SpO2 96%   BMI 29.15 kg/m²   General:   Alert, awake, Oriented x3  .comfortable in bed, No distress. Head: Atraumatic , Normocephalic   Eyes: PERRL. No sclera icterus. No conjunctival injection. No discharge   ENT: No nasal  discharge. Pharynx clear. Neck:  Trachea midline. No thyromegaly, no JVD, No cervical adenopathy. Chest : Bilaterally symmetrical ,Normal effort,  No accessory muscle use  Lung : Diminished BS bilateraly. No Rales. No wheezing. No rhonchi. Heart[de-identified] Normal  rate. Regular rhythm. No mumur ,  Rub or gallop  ABD: Non-tender. Non-distended. No masses. No organmegaly. Normal bowel sounds.  No hernia. Extremities: No pitting in both lower leg , No Cyanosis ,No clubbing  Neuro: no cranial nerve abnormality, normal reflex and sensation, no focal weakness   Skin: Warm and dry. No erythema rash on exposed extremities. Data Review  Recent Labs     01/31/23 2345 02/01/23  0512   WBC 8.4 8.0   HGB 14.0 13.2*   HCT 41.6* 39.0*    162      Recent Labs     01/31/23  2345 02/01/23  0007 02/01/23 0512   *  --  136   K 4.3  --  4.7   CL 99  --  103   CO2 19*  --  22   BUN 14  --  14   CREATININE 0.59* 0.7* 0.71   GLUCOSE 186*  --  201*       MV Settings: ABGs: No results for input(s): PHART, QOG8WLY, PO2ART, SKS5GAJ, BEART, I2ZJYQDR, SGJ9GSN in the last 72 hours. No results found for: LACTA    Radiology  CTA CHEST W WO CONTRAST    Result Date: 2/1/2023  EXAMINATION: CTA OF THE CHEST WITH AND WITHOUT CONTRAST 2/1/2023 12:52 am TECHNIQUE: CTA of the chest was performed before and after the administration of intravenous contrast.  Multiplanar reformatted images are provided for review. MIP images are provided for review. Automated exposure control, iterative reconstruction, and/or weight based adjustment of the mA/kV was utilized to reduce the radiation dose to as low as reasonably achievable. COMPARISON: None. HISTORY: ORDERING SYSTEM PROVIDED HISTORY: Rule out PE TECHNOLOGIST PROVIDED HISTORY: Reason for exam:->Rule out PE Decision Support Exception - unselect if not a suspected or confirmed emergency medical condition->Emergency Medical Condition (MA) What reading provider will be dictating this exam?->CRC FINDINGS: Pulmonary Arteries: Exam is nondiagnostic for evaluation of pulmonary emboli due to the timing of the contrast bolus as well as motion artifact. No central filling defect identified. Main pulmonary artery is normal in caliber. Mediastinum: There is a 2.4 cm right hilar lymph node. Borderline lymph nodes seen in the right paratracheal region measuring 1 cm.   Cardiac chambers are within normal limits. No pericardial effusion. No significant coronary artery calcification identified. Lungs/pleura: Dense consolidative infiltrate identified posteriorly within the right upper lobe. Underlying lesion at this time cannot be completely excluded. Follow-up imaging to resolution is recommended. Some fiber nodular density seen extending into the right upper lung field favoring an infectious process. No definite pleural effusion or pneumothorax. Upper Abdomen: Limited images of the upper abdomen are unremarkable. Soft Tissues/Bones: No acute bone or soft tissue abnormality. Multilevel degenerative changes seen within the spine. No acute or aggressive osseous abnormality. No acute chest wall abnormality. The exam is nondiagnostic for evaluation for pulmonary embolism. Dense consolidative infiltrate posteriorly within the right upper lobe. Findings favor an infectious process however underlying mass at this time cannot be completely excluded. Follow-up to resolution is recommended. XR CHEST PORTABLE    Result Date: 2/1/2023  EXAMINATION: ONE XRAY VIEW OF THE CHEST 1/31/2023 11:49 pm COMPARISON: None. HISTORY: ORDERING SYSTEM PROVIDED HISTORY: Sepsis TECHNOLOGIST PROVIDED HISTORY: Reason for exam:->Sepsis What reading provider will be dictating this exam?->CRC FINDINGS: Normal cardiomediastinal silhouette. Right mid lung airspace disease suggesting localized pneumonitis. No pneumothorax or effusion. Osseous thorax intact. Right mid lung airspace disease suggesting localized pneumonitis. No effusions. RECOMMENDATION: Careful clinical correlation and follow up recommended. Assessment and  plan:     Right midlung pneumonia, likely community-acquired, rule out TB, rule out underlying lung mass  Hemoptysis secondary to above.   GERD  Diabetes mellitus  Anxiety    Patient had CT chest done showed no pulmonary embolism adhesions consolidation infiltration posteriorly within the right upper lobe finding favor infectious process.  Underlying mass cannot be ruled out he has been coughing bloody mucus in last 2 days but he has been coughing yellow mucus most of the time with fever likely community-acquired pneumonia if does not clear up need bronchoscopy.  He was started on Vanco and Merrem.  ID is consulted.  Continue current IV antibiotics pending ID recommendation.  Dr. Medrano placed him on TB precaution.  He denies any recent exposure to TB.  He will have AFB x3, QuantiFERON gold, PPD.  If negative discontinue TB precaution.  He is currently room air on and O2 saturation is 96%.  Discussed with Dr. Medrano    Thank you for this consult.  NOTE: This report was transcribed using voice recognition software. Every effort was made to ensure accuracy; however, inadvertent computerized transcription errors may be present.    Electronically signed by Zachary Harden MD, FCCP on 2/1/2023 at 7:35 AM

## 2023-02-01 NOTE — FLOWSHEET NOTE
1426: TB skin test given in RFA, will need read in 48 hours. .Electronically signed by Levi Carrasquillo RN on 2/1/2023 at 2:27 PM  1500: Urine and sputum culture obtained and sent to lab. Pending results.  .Electronically signed by Levi Carrasquillo RN on 2/1/2023 at 6:18 PM

## 2023-02-01 NOTE — PLAN OF CARE
Problem: Discharge Planning  Goal: Discharge to home or other facility with appropriate resources  Outcome: Progressing     Problem: Pain  Goal: Verbalizes/displays adequate comfort level or baseline comfort level  Outcome: Progressing     Problem: Chronic Conditions and Co-morbidities  Goal: Patient's chronic conditions and co-morbidity symptoms are monitored and maintained or improved  Outcome: Progressing   Patient progressing towards discharge

## 2023-02-01 NOTE — H&P
[place] [address] MEDICINE    HISTORY AND PHYSICAL EXAM    PATIENT NAME:  Tyra Woods    MRN:  [id number]  SERVICE DATE:  2/1/2023   SERVICE TIME:  2:41 AM    Primary Care Physician: Genesis Frausto PA-C     SUBJECTIVE  CHIEF COMPLAINT: Cough with hemoptysis    HPI:  Tyra Woods is a 48 y. o.\  male who presents with complaint of cough with hemoptysis. PMH significant for  has a past medical history of BPH without urinary obstruction, Bunion of great toe of right foot, Controlled diabetes mellitus type 2 with complications ([address] Diabetic neuropathy associated with type 2 diabetes mellitus ([address] Diverticulitis, Erectile dysfunction, Folic acid deficiency, GERD (gastroesophageal reflux disease), Low HDL (under 40), Non-compliance with treatment, and Obesity (BMI 30-39.9). .      Patient is a 24-year-old male presenting from home with complaint of cough for approximately the past 1 month with romana bright red hemoptysis for approximately the past 2 days. In ED he was found to have initial fever 102.2 °F, tachycardia to 140s on initial presentation (continue ventilator following presentation), and tachypnea without hypoxia. He has been having some yellow sputum production. He states he had an illness episode approximately 1 month previously from which she thought he had fully recovered and had return to work with no limitation or respiratory symptoms, aside from mild persistent cough in the intervening month until onset of bright red mopped assist with cough 1-2 days previously. Patient is a non-smoker. He has no known prior pulmonary malignancies or pathologies. He did reside in [facility] for 24 years. He denies any recent night sweats or unintentional weight loss. He denies any known recent sick contacts, and denies any known immediate friends or family members who may have tuberculosis. He has never had previous symptoms like this before. He denies any nausea or vomiting.   He has intermittent right pleuritic chest pain with coughing. He is still coughing up some slightly colored sputum with occasional faint blood flecks but nothing like immediate bright red blood he had multiple episodes of previous day when coughing. He has no other new/acute complaints at present. PAST MEDICAL HISTORY:    Past Medical History:   Diagnosis Date    BPH without urinary obstruction     Bunion of great toe of right foot     Controlled diabetes mellitus type 2 with complications ([facility] [address] 2981    Diabetic neuropathy associated with type 2 diabetes mellitus ([facility] [address]     EMG Dr Decker Amen    Diverticulitis     Erectile dysfunction     Dr Sierra Shock acid deficiency 5141    GERD (gastroesophageal reflux disease)     Low HDL (under 40)     Non-compliance with treatment 2015    Obesity (BMI 30-39. 9)      PAST SURGICAL HISTORY:    Past Surgical History:   Procedure Laterality Date    ABDOMEN SURGERY  2009    for diverticulitis    ABDOMINAL HERNIA REPAIR  2010    COLON SURGERY  2008    COLONOSCOPY      COLONOSCOPY N/A 9/1/2020    COLORECTAL CANCER SCREENING, HIGH RISK performed by Marie Ortega MD at [facility], COLON, DIAGNOSTIC      THUMB AMPUTATION Right     due to accident     FAMILY HISTORY:    Family History   Problem Relation Age of Onset    Diabetes Father     Hypertension Mother     Colon Cancer Neg Hx      SOCIAL HISTORY:    Social History     Socioeconomic History    Marital status:      Spouse name: Not on file    Number of children: 5    Years of education: Not on file    Highest education level: Not on file   Occupational History    Occupation: ,      Employer: [employer]   Tobacco Use    Smoking status: Never    Smokeless tobacco: Never   Vaping Use    Vaping Use: Never used   Substance and Sexual Activity    Alcohol use:  Yes     Alcohol/week: 0.0 standard drinks     Comment: rare     Drug use: No    Sexual activity: Not on file   Other Topics Concern Not on file   Social History Narrative    Born in [facility] [place] [place], father from [place], one sister and one brother    , spouse works in a [employer], office job, [employer] at [facility] [employer], 3 daughters and 2 sons    Lives in a house in [place], with spouse and 4 children    Hobbies: [employer]ondenominational, community service, music (keyboard)             Social Determinants of [address] Strain: Low Risk     Difficulty of Paying Living Expenses: Not hard at [address]: No Food Insecurity    Worried About [address] in the Last Year: Never true    [address] in the Last Year: Never true   Transportation Needs: No Transportation Needs    Lack of Transportation (Medical): No    Lack of Transportation (Non-Medical): No   Physical Activity: Not on file   Stress: Not on file   Social Connections: Not on file   Intimate Partner Violence: Not on file   Housing Stability: Not on file     MEDICATIONS:   Prior to Admission medications    Medication Sig Start Date End Date Taking? Authorizing Provider   pravastatin (PRAVACHOL) 10 MG tablet TAKE 1 TABLET BY MOUTH IN THE EVENING 1/30/23   Ángel Garcia MD   miconazole (MICOTIN) 2 % cream Apply topically 2 times daily.   Patient not taking: Reported on 12/28/2022 11/15/22   ESTEFANY Weaver   Semaglutide, 1 MG/DOSE, (OZEMPIC, 1 MG/DOSE,) 4 MG/3ML SOPN Inject 1 mg into the skin once a week 9/19/22   ESTEFANY Weaver   empagliflozin (JARDIANCE) 10 MG tablet Take 1 tablet by mouth daily 9/19/22   ESTEFANY Weaver   Continuous Blood Gluc Sensor (FREESTYLE AMANDA 14 DAY SENSOR) MISC USE AS DIRECTED EVERY 14 DAYS 9/19/22   ESTEFANY Weaver   betamethasone dipropionate (DIPROLENE) 0.05 % ointment APPLY TO AFFECTED AREA TWICE A DAY 9/13/22   Codie Andrade PA-C   omeprazole (PRILOSEC) 20 MG delayed release capsule TAKE 1 CAPSULE BY MOUTH EVERY DAY 7/20/22   ESTEFANY Weaver   lisinopril (PRINIVIL;ZESTRIL) 5 MG tablet TAKE 1 TABLET BY MOUTH EVERY DAY 7/20/22   ESTEFANY Parsons   tadalafil (CIALIS) 20 MG tablet Take 1 tablet by mouth daily as needed for Erectile Dysfunction 4/26/22   Joy Valdez MD   tadalafil (CIALIS) 20 MG tablet TAKE 1 TABLET BY MOUTH ONE TIME A DAY 4/19/22   Joy Valdez MD   folic acid (FOLVITE) 1 MG tablet TAKE 1 TABLET BY MOUTH EVERY DAY 1/10/22   Codie Andrade PA-C   Continuous Blood Gluc  (FREESTYLE AMANDA 14 DAY READER) ABIGAIL USE AS DIRECTED BEFORE MEALS AND NIGHTLY 5/28/20   Marita Toledo MD   Multiple Vitamin (MULTI-VITAMIN) TABS Take 1 tablet by mouth daily. Patient not taking: Reported on 12/28/2022    Historical Provider, MD       ALLERGIES: Zosyn [piperacillin sod-tazobactam so]    REVIEW OF SYSTEM:   A full 12 point review of systems was completed, and was unremarkable except as specified above. OBJECTIVE    /73   Pulse 97   Temp (!) 102.2 °F (39 °C) (Oral)   Resp 23   Ht 6' 2\" (1.88 m)   Wt 227 lb (103 kg)   SpO2 96%   BMI 29.15 kg/m²     PHYSICAL EXAM:   Constitutional: Adult male reclining in bed no acute distress  Head: NCAT  Eyes: PERRLA, EOMI  Neck: Trachea midline, phonation normal  Cardiovascular: RRR, borderline tachycardic. No significant murmurs appreciated. Warm well perfused peripherally. No pretibial edema. Pulmonary: Normal rate and effort of respiration on room air. Grossly CTAB, without overt respiratory sound diminishment, wheezing, crackles, rhonchi, or stridor. Intermittent coughing, nonproductive during exam, slightly croupy sounding in nature. Abdomen: Mildly obese, soft, nontense, nondistended. Bowel sounds appreciated. Nontender to palpation. Neurologic: Alert, grossly oriented. Nonfocal.  Psychiatric: Pleasant and cooperative. Mildly anxious. DATA:     Diagnostic tests reviewed for today's visit:    Most recent labs and imaging results reviewed.      LABS:    Recent Results (from the past 24 hour(s))   CBC with Auto Differential    Collection Time: 01/31/23 11:45 PM   Result Value Ref Range    WBC 8.4 4.8 - 10.8 K/uL    RBC 4.72 4.70 - 6.10 M/uL    Hemoglobin 14.0 14.0 - 18.0 g/dL    Hematocrit 41.6 (L) 42.0 - 52.0 %    MCV 88.0 79.0 - 92.2 fL    MCH 29.7 27.0 - 31.3 pg    MCHC 33.7 33.0 - 37.0 %    RDW 14.3 11.5 - 14.5 %    Platelets 352 699 - 880 K/uL    Neutrophils % 87.3 %    Lymphocytes % 3.7 %    Monocytes % 8.6 %    Eosinophils % 0.2 %    Basophils % 0.2 %    Neutrophils Absolute 7.3 (H) 1.4 - 6.5 K/uL    Lymphocytes Absolute 0.3 (L) 1.0 - 4.8 K/uL    Monocytes Absolute 0.7 0.2 - 0.8 K/uL    Eosinophils Absolute 0.0 0.0 - 0.7 K/uL    Basophils Absolute 0.0 0.0 - 0.2 K/uL   Troponin    Collection Time: 01/31/23 11:45 PM   Result Value Ref Range    Troponin <0.010 0.000 - 0.010 ng/mL   Comprehensive Metabolic Panel    Collection Time: 01/31/23 11:45 PM   Result Value Ref Range    Sodium 130 (L) 135 - 144 mEq/L    Potassium 4.3 3.4 - 4.9 mEq/L    Chloride 99 95 - 107 mEq/L    CO2 19 (L) 20 - 31 mEq/L    Anion Gap 12 9 - 15 mEq/L    Glucose 186 (H) 70 - 99 mg/dL    BUN 14 6 - 20 mg/dL    Creatinine 0.59 (L) 0.70 - 1.20 mg/dL    Est, Glom Filt Rate >60.0 >60    Calcium 8.7 8.5 - 9.9 mg/dL    Total Protein 7.5 6.3 - 8.0 g/dL    Albumin 3.8 3.5 - 4.6 g/dL    Total Bilirubin 0.7 0.2 - 0.7 mg/dL    Alkaline Phosphatase 85 35 - 104 U/L    ALT 15 0 - 41 U/L    AST 18 0 - 40 U/L    Globulin 3.7 (H) 2.3 - 3.5 g/dL   Magnesium    Collection Time: 01/31/23 11:45 PM   Result Value Ref Range    Magnesium 2.0 1.7 - 2.4 mg/dL   TSH    Collection Time: 01/31/23 11:45 PM   Result Value Ref Range    TSH 0.378 (L) 0.440 - 3.860 uIU/mL   Brain Natriuretic Peptide    Collection Time: 01/31/23 11:45 PM   Result Value Ref Range    Pro-BNP 39 pg/mL   Lactate, Sepsis    Collection Time: 02/01/23 12:02 AM   Result Value Ref Range    Lactic Acid, Sepsis 1.0 0.5 - 1.9 mmol/L   Rapid influenza A/B antigens    Collection Time: 02/01/23 12:03 AM Specimen: Nasopharyngeal   Result Value Ref Range    Influenza A by PCR Negative     Influenza B by PCR Negative    D-Dimer, Quantitative    Collection Time: 02/01/23 12:03 AM   Result Value Ref Range    D-Dimer, Quant 0.35 0.00 - 0.50 mg/L FEU   COVID-19, Rapid    Collection Time: 02/01/23 12:04 AM    Specimen: Nasopharyngeal Swab   Result Value Ref Range    SARS-CoV-2, NAAT Not Detected Not Detected   TYPE AND SCREEN    Collection Time: 02/01/23 12:10 AM   Result Value Ref Range    ABO/Rh O POS     Antibody Screen NEG        IMAGING:  XR CHEST PORTABLE    Result Date: 2/1/2023  EXAMINATION: ONE XRAY VIEW OF THE CHEST 1/31/2023 11:49 pm COMPARISON: None. HISTORY: ORDERING SYSTEM PROVIDED HISTORY: Sepsis TECHNOLOGIST PROVIDED HISTORY: Reason for exam:->Sepsis What reading provider will be dictating this exam?->CRC FINDINGS: Normal cardiomediastinal silhouette. Right mid lung airspace disease suggesting localized pneumonitis. No pneumothorax or effusion. Osseous thorax intact. Right mid lung airspace disease suggesting localized pneumonitis. No effusions. RECOMMENDATION: Careful clinical correlation and follow up recommended.        VTE Prophylaxis: SCDs    ASSESSMENT AND PLAN    Principal Problem:  Cough with hemoptysis  DDx to include new CAP vs recurrent pneumonia from 1 month previous vs tuberculosis (some risk factors) vs underlying pulmonary malignancy - TBD    Other Problems:  Fever in setting of above  GERD  DM2  Hx diverticulitis status post surgery  BPH  Anxiety  Obesity    Plan:   Admit inpatient status with telemetry  Airborne and droplet precautions pending TB work-up  TB evaluation to include PPD, QuantiFERON gold, AFB stain sputum  Infectious disease and pulmonology consult  SCDs for DVT prophylaxis in setting of hemoptysis  Vancomycin/meropenem, pending ID recommendations (in setting of possible Zosyn intolerance when administered initial Zosyn Zosyn in ED)  Adult diabetic diet  Medium sliding scale correction insulin AC TID and QHS  Continue/hold other home medications as ordered medication history completed.       Plan of care discussed with: patient    SIGNATURE: Zulma Acevedo DO  DATE: February 1, 2023  TIME: 2:41 AM

## 2023-02-01 NOTE — CONSULTS
Infectious Diseases Inpatient Consult Note      Reason for Consult:   Hemoptysis and pneumonia  Requesting Physician:   Dr Kem Brown  Primary Care Physician:  Jamila Gonzales PA-C  History Obtained From:   Pt, EPIC    Admit Date: 1/31/2023  Hospital Day: 2      HISTORY OF PRESENT ILLNESS:  This is a 48 y.o. male was admitted to [facility],  from home through ER because of hemoptysis, grossly bloody sputum that started 2 days ago following a severe coughing bout. Patient started having fevers yesterday. He reported that he has been coughing for 4 weeks. Had upper respiratory symptoms initially. About 1-1/2-week ago, he started coughing green phlegm. Patient had 102.2 fever on admission with associated tachycardia  Patient denies any weight loss or night sweats  He denies any previous history of pneumonia  Patient travelled to [facility] was last August  He denies being exposed to anybody with tuberculosis  He denies any history of tuberculosis  Patient was found to have right upper lobe consolidation. Was found to have hyponatremia on admission. Normal white blood cell count. Past medical surgical and social history were reviewed and are as detailed below  Past Medical History:   Diagnosis Date    Anxiety     BPH without urinary obstruction     Bunion of great toe of right foot     [facility] 6056    Diabetic neuropathy associated with type 2 diabetes mellitus (HCC)     EMG Dr Brooke Fermin    Diverticulitis     Erectile dysfunction     Dr Jennifer Gallardo acid deficiency 0612    GERD (gastroesophageal reflux disease)     Low HDL (under 40)     Non-compliance with treatment 2015    Obesity (BMI 30-39. 9)        Past Surgical History:   Procedure Laterality Date    ABDOMEN SURGERY  2009    for diverticulitis    ABDOMINAL HERNIA REPAIR  2010    COLON SURGERY  2008    COLONOSCOPY      COLONOSCOPY N/A 9/1/2020    COLORECTAL CANCER SCREENING, HIGH RISK performed by Tigre BARRIOS Cesar Rodrigues MD at [place] [address], COLON, DIAGNOSTIC      THUMB AMPUTATION Right     due to accident       Current Medications:     sodium chloride flush  5-40 mL IntraVENous 2 times per day    meropenem  1,000 mg IntraVENous Q8H    insulin lispro  0-8 Units SubCUTAneous TID WC    insulin lispro  0-4 Units SubCUTAneous Nightly    tuberculin  5 Units IntraDERmal Once       Allergies:  Zosyn [piperacillin sod-tazobactam so]    Social History     Socioeconomic History    Marital status:      Spouse name: Not on file    Number of children: 5    Years of education: Not on file    Highest education level: Not on file   Occupational History    Occupation: ,      Employer: [employer]   Tobacco Use    Smoking status: Never    Smokeless tobacco: Never   Vaping Use    Vaping Use: Never used   Substance and Sexual Activity    Alcohol use: Yes     Comment: rare/social    Drug use: No    Sexual activity: Yes     Partners: Female   Other Topics Concern    Not on file   Social History Narrative    Born in [facility], father from [place], one sister and one brother    , spouse works in a duct tape factory    Works [employer], office job, [employer] at [employer], 3 daughters and 2 sons    Lives in a house in [facility] [place], with spouse and 4 children    Hobbies: Religious, community service, music (keyboard)             Social Determinants of Health     Financial Resource Strain: Low Risk     Difficulty of Paying Living Expenses: Not hard at [employer] Insecurity: No Food Insecurity    Worried About [address] in the Last Year: Never true    [address] in the Last Year: Never true   Transportation Needs: No Transportation Needs    Lack of Transportation (Medical): No    Lack of Transportation (Non-Medical):  No   Physical Activity: Not on file   Stress: Not on file   Social Connections: Not on file   Intimate Partner Violence: Not on file   Housing Stability: Not on file         Family History:   Family History   Problem Relation Age of Onset    Diabetes Father     Hypertension Mother     Colon Cancer Neg Hx        Review of Systems  14 system review is negative other than HPI    Physical Exam  Vitals:    02/01/23 0459 02/01/23 0520 02/01/23 0730 02/01/23 1144   BP:   135/73 126/74   Pulse:   85 88   Resp:   18 18   Temp:   98.9 °F (37.2 °C) 97.9 °F (36.6 °C)   TempSrc:   Oral Oral   SpO2:  96%  97%   Weight: 227 lb (103 kg)      Height: 6' 2\" (1.88 m)        General Appearance: alert and oriented to person, place and time, well-developed and well-nourished, in no acute distress, on RA  Skin: warm anddry, no rash. Head: normocephalic and atraumatic  Eyes: extraocular eye movements intact, conjunctivae normal, anicteric sclerae  ENT: oropharynx clear and moist with normal mucous membranes.  No thrush  Lungs: normal respiratory effort, Clear Lungs, no rhonchi, no crackles, no wheezes, right apical rales  Heart:RRR, nl S1/S2, no murmur  Abdomen: soft, no tenderness, no H-S-megaly, + BS  NEUROLOGICAL: alert and oriented x 3, no focal deficits  No leg edema  No erythema, no warmth, no tenderness  No lymphadenopathy      DATA:    Lab Results   Component Value Date    WBC 8.0 02/01/2023    HGB 13.2 (L) 02/01/2023    HCT 39.0 (L) 02/01/2023    MCV 87.4 02/01/2023     02/01/2023     Lab Results   Component Value Date    CREATININE 0.71 02/01/2023    BUN 14 02/01/2023     02/01/2023    K 4.7 02/01/2023     02/01/2023    CO2 22 02/01/2023       Hepatic Function Panel:   Lab Results   Component Value Date/Time    ALKPHOS 85 01/31/2023 11:45 PM    ALT 15 01/31/2023 11:45 PM    AST 18 01/31/2023 11:45 PM    PROT 7.5 01/31/2023 11:45 PM    BILITOT 0.7 01/31/2023 11:45 PM    LABALBU 3.8 01/31/2023 11:45 PM     Procalcitonin of 0.56  Albumin level of 3.8 from yesterday  Hyponatremia on admission  CTA of the chest  Imaging:   Impression:  The exam is nondiagnostic for evaluation for pulmonary embolism. Dense   consolidative infiltrate posteriorly within the right upper lobe. Findings   favor an infectious process however underlying mass at this time cannot be   completely excluded. Follow-up to resolution is recommended. IMPRESSION:    Right upper lobe pneumonia  Hemoptysis and anemia associated with chronic cough of 4 weeks duration, rule out tuberculosis, rule out malignancy  History of travel to [facility]    Patient Active Problem List   Diagnosis    Type 2 diabetes mellitus with complication, without long-term current use of insulin (HCC)    Obesity (BMI 30-39. 9)    Low HDL (under 40)    Erectile dysfunction associated with type 2 diabetes mellitus (HCC)    Diverticulosis of large intestine    Albuminuria    Vitamin D deficiency    Gastroesophageal reflux disease without esophagitis    BPH without urinary obstruction    Varicose veins of both lower extremities with inflammation    Flexural atopic dermatitis    Yeast infection of the skin    Cough with hemoptysis       PLAN:  Sputum for AFB once daily x3 days  Blood for TB QuantiFERON and HIV screen in a.m.   Change meropenem to Rocephin and Zithromax  Urine for pneumococcal and Legionella antigen  DC IV vancomycin  Follow-up blood cultures  Check MRSA nasal screen      Discussed with patient and RN    Brooke Dc MD

## 2023-02-01 NOTE — PROGRESS NOTES
[facility] Pharmacokinetic Monitoring Service - Vancomycin    Consulting Provider: Dr Cisco Lopez    Indication: cavitary pneumonia vs TB  Target Concentration: Goal AUC/ANNALISA 400-600 mg*hr/L  Day of Therapy: 1  Additional Antimicrobials: Merrem    Pertinent Laboratory Values: Wt Readings from Last 1 Encounters:   02/01/23 227 lb (103 kg)     Temp Readings from Last 1 Encounters:   02/01/23 98.9 °F (37.2 °C) (Oral)     Estimated Creatinine Clearance: 154 mL/min (based on SCr of 0.71 mg/dL). Recent Labs     01/31/23  2345 02/01/23  0007 02/01/23  0512   CREATININE 0.59* 0.7* 0.71   WBC 8.4  --  8.0     Procalcitonin:   Procalcitonin   Date Value Ref Range Status   02/01/2023 0.56 (H) 0.00 - 0.15 ng/mL Final     Comment:     Suspected Sepsis:  Low likelihood of sepsis  <.50 ng/mL    Increased likelihood of sepsis 0.50-2.00 ng/mL  Antibiotics encouraged    High risk of sepsis/shock   >2.00 ng/mL  Antibiotics strongly encouraged    Suspected Lower Respiratory Tract Infections:  Low likelihood of bacterial infection  <0.24 ng/mL    Increased likelihood of bacterial infection >0.24 ng/mL  Antibiotics encouraged    With successful antibiotic therapy, PCT levels should decrease  rapidly. (Half-life of 24 to 36 hours.)    Procalcitonin values from samples collected within the first  6 hours of systemic infection may still be low. Retesting may be indicated. Values from day 1 and day 4 can be entered into the Change in  Procalcitonin Calculator to determine the patient's  Mortality Risk Prognosis  ([email or web address] com)    In healthy neonates, plasma Procalcitonin (PCT) concentrations  increase gradually after birth, reaching peak values at about  24 hours of age then decrease to normal values below 0.5 ng/mL  by 48-72 hours of age.            Pertinent Cultures:  Culture Date Source Results   02/01/23 Quantiferon TB Gold Pending   02/01/23 Culture with Smear, Acid Fast Bacillius w/ Reflex to MTD - Sputum Aspirated Pending   02/01/23 Blood Cultures x 2 Pending   02/01/23 COVID-19, Rapid Negative   02/01/23 Rapid Influenza A/B Antigens Negative   MRSA Nasal Swab: not ordered. Order placed by pharmacy.  Awaiting results    Recent vancomycin administrations                     vancomycin (VANCOCIN) 1,500 mg in sodium chloride 0.9 % 500 mL IVPB (ADDAVIAL) (mg) 1,500 mg New Bag 02/01/23 0232                    Assessment:  Date/Time Current Dose Concentration Timing of Concentration (h) AUC   02/01/23 Vancomycin 1250 mg IVPB Q12H - - 459 mg/L.hr   Note: Serum concentrations collected for AUC dosing may appear elevated if collected in close proximity to the dose administered, this is not necessarily an indication of toxicity    Plan:  Current dosing regimen is therapeutic  Continue current dose of Vancomycin 1250 mg IVPB Q12H  Dose Predicted SS Trough Predicted SS AUC   Vancomycin 1250 mg IVPB Q12H 14.2 mg/L 459 mg/L.hr   Repeat vancomycin concentration ordered for 02/03 @ 0600   Pharmacy will continue to monitor patient and adjust therapy as indicated    Thank you for the consult,    Kenrick ArmasD  PGY-1 Pharmacy Resident  2/1/2023 10:36 AM

## 2023-02-01 NOTE — FLOWSHEET NOTE
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  .Electronically signed by Clemente Gibson RN on 2/1/2023 at 10:22 AM

## 2023-02-02 LAB
ANION GAP SERPL CALCULATED.3IONS-SCNC: 13 MEQ/L (ref 9–15)
BASOPHILS ABSOLUTE: 0 K/UL (ref 0–0.2)
BASOPHILS RELATIVE PERCENT: 0.1 %
BLOOD CULTURE, ROUTINE: NORMAL
BUN BLDV-MCNC: 21 MG/DL (ref 6–20)
CALCIUM SERPL-MCNC: 8.5 MG/DL (ref 8.5–9.9)
CHLORIDE BLD-SCNC: 102 MEQ/L (ref 95–107)
CO2: 21 MEQ/L (ref 20–31)
CREAT SERPL-MCNC: 0.59 MG/DL (ref 0.7–1.2)
CULTURE, BLOOD 2: NORMAL
EKG ATRIAL RATE: 122 BPM
EKG P AXIS: 23 DEGREES
EKG P-R INTERVAL: 140 MS
EKG Q-T INTERVAL: 350 MS
EKG QRS DURATION: 102 MS
EKG QTC CALCULATION (BAZETT): 498 MS
EKG R AXIS: 20 DEGREES
EKG T AXIS: 38 DEGREES
EKG VENTRICULAR RATE: 122 BPM
EOSINOPHILS ABSOLUTE: 0.1 K/UL (ref 0–0.7)
EOSINOPHILS RELATIVE PERCENT: 0.5 %
GFR SERPL CREATININE-BSD FRML MDRD: >60 ML/MIN/{1.73_M2}
GLUCOSE BLD-MCNC: 114 MG/DL (ref 70–99)
GLUCOSE BLD-MCNC: 124 MG/DL (ref 70–99)
GLUCOSE BLD-MCNC: 139 MG/DL (ref 70–99)
GLUCOSE BLD-MCNC: 215 MG/DL (ref 70–99)
GLUCOSE BLD-MCNC: 259 MG/DL (ref 70–99)
HCT VFR BLD CALC: 37.6 % (ref 42–52)
HEMOGLOBIN: 12.7 G/DL (ref 14–18)
INR BLD: 1.1
LACTIC ACID: 0.9 MMOL/L (ref 0.5–2.2)
LYMPHOCYTES ABSOLUTE: 0.9 K/UL (ref 1–4.8)
LYMPHOCYTES RELATIVE PERCENT: 9.7 %
MCH RBC QN AUTO: 29 PG (ref 27–31.3)
MCHC RBC AUTO-ENTMCNC: 33.7 % (ref 33–37)
MCV RBC AUTO: 85.9 FL (ref 79–92.2)
MONOCYTES ABSOLUTE: 1.1 K/UL (ref 0.2–0.8)
MONOCYTES RELATIVE PERCENT: 11.2 %
NEUTROPHILS ABSOLUTE: 7.6 K/UL (ref 1.4–6.5)
NEUTROPHILS RELATIVE PERCENT: 78.5 %
PDW BLD-RTO: 14.4 % (ref 11.5–14.5)
PERFORMED ON: ABNORMAL
PLATELET # BLD: 183 K/UL (ref 130–400)
POTASSIUM REFLEX MAGNESIUM: 3.6 MEQ/L (ref 3.4–4.9)
PROCALCITONIN: 0.41 NG/ML (ref 0–0.15)
PROTHROMBIN TIME: 14.4 SEC (ref 12.3–14.9)
RBC # BLD: 4.37 M/UL (ref 4.7–6.1)
SODIUM BLD-SCNC: 136 MEQ/L (ref 135–144)
WBC # BLD: 9.7 K/UL (ref 4.8–10.8)

## 2023-02-02 PROCEDURE — 85025 COMPLETE CBC W/AUTO DIFF WBC: CPT

## 2023-02-02 PROCEDURE — 36415 COLL VENOUS BLD VENIPUNCTURE: CPT

## 2023-02-02 PROCEDURE — 99232 SBSQ HOSP IP/OBS MODERATE 35: CPT | Performed by: INTERNAL MEDICINE

## 2023-02-02 PROCEDURE — 1210000000 HC MED SURG R&B

## 2023-02-02 PROCEDURE — 6360000002 HC RX W HCPCS: Performed by: INTERNAL MEDICINE

## 2023-02-02 PROCEDURE — 6370000000 HC RX 637 (ALT 250 FOR IP): Performed by: INTERNAL MEDICINE

## 2023-02-02 PROCEDURE — 87116 MYCOBACTERIA CULTURE: CPT

## 2023-02-02 PROCEDURE — 85610 PROTHROMBIN TIME: CPT

## 2023-02-02 PROCEDURE — 87070 CULTURE OTHR SPECIMN AEROBIC: CPT

## 2023-02-02 PROCEDURE — 94669 MECHANICAL CHEST WALL OSCILL: CPT

## 2023-02-02 PROCEDURE — 2580000003 HC RX 258: Performed by: INTERNAL MEDICINE

## 2023-02-02 PROCEDURE — 80048 BASIC METABOLIC PNL TOTAL CA: CPT

## 2023-02-02 PROCEDURE — 83605 ASSAY OF LACTIC ACID: CPT

## 2023-02-02 PROCEDURE — 94150 VITAL CAPACITY TEST: CPT

## 2023-02-02 PROCEDURE — 87641 MR-STAPH DNA AMP PROBE: CPT

## 2023-02-02 PROCEDURE — 84145 PROCALCITONIN (PCT): CPT

## 2023-02-02 PROCEDURE — 87389 HIV-1 AG W/HIV-1&-2 AB AG IA: CPT

## 2023-02-02 PROCEDURE — 86480 TB TEST CELL IMMUN MEASURE: CPT

## 2023-02-02 RX ORDER — GUAIFENESIN 600 MG/1
600 TABLET, EXTENDED RELEASE ORAL 2 TIMES DAILY
Status: DISCONTINUED | OUTPATIENT
Start: 2023-02-02 | End: 2023-02-03 | Stop reason: HOSPADM

## 2023-02-02 RX ORDER — ENOXAPARIN SODIUM 100 MG/ML
40 INJECTION SUBCUTANEOUS DAILY
Status: DISCONTINUED | OUTPATIENT
Start: 2023-02-03 | End: 2023-02-03

## 2023-02-02 RX ADMIN — SODIUM CHLORIDE, PRESERVATIVE FREE 10 ML: 5 INJECTION INTRAVENOUS at 08:47

## 2023-02-02 RX ADMIN — SODIUM CHLORIDE, PRESERVATIVE FREE 10 ML: 5 INJECTION INTRAVENOUS at 20:06

## 2023-02-02 RX ADMIN — AZITHROMYCIN DIHYDRATE 500 MG: 500 INJECTION, POWDER, LYOPHILIZED, FOR SOLUTION INTRAVENOUS at 15:28

## 2023-02-02 RX ADMIN — CEFTRIAXONE SODIUM 1000 MG: 1 INJECTION, POWDER, FOR SOLUTION INTRAMUSCULAR; INTRAVENOUS at 14:45

## 2023-02-02 RX ADMIN — INSULIN LISPRO 2 UNITS: 100 INJECTION, SOLUTION INTRAVENOUS; SUBCUTANEOUS at 11:59

## 2023-02-02 RX ADMIN — GUAIFENESIN 600 MG: 600 TABLET, EXTENDED RELEASE ORAL at 22:36

## 2023-02-02 ASSESSMENT — PAIN SCALES - GENERAL
PAINLEVEL_OUTOF10: 0
PAINLEVEL_OUTOF10: 0

## 2023-02-02 NOTE — CARE COORDINATION
IV BENEFIT REQUEST FORM    FAX FROM: [facility]                        [address], [place], [place] [facility]    REQUESTED BY: Electronically signed by Jayda Christie RN on 2023 at 8:35 AM                                               RN/C3: PHONE: [phone])     DATE:/TIME OF REQUEST: 23  TIME: 8:35 AM      TO: [address] [facility]      FAX TO: [phone]    PHONE: [phone]     THIS PATIENT HAS BEEN IDENTIFIED TO POSSIBLY NEED LONG TERM IV'S. PLEASE CHECK INSURANCE COVERAGE FOR THE FOLLOWING PT/DRUGS. PATIENT'S Mark Anthony Garcia                              [facility]   PATIENT'S : 1970  PATIENT ADDRESS: [address] [facility] [address]  SSN:    ([id number])     PAYOR NAME:  Payor: Michele Fay / Plan: [facility] / Product Type: *No Product type* /     DRUG: (zithromax)                         DOSE: (500mg)            FREQUENCY: Q (24) HR    DRUG: (rocephin)                         DOSE: (1000mg)            FREQUENCY: Q (24) HR    DRUG: ()                         DOSE: ()            FREQUENCY: Q () HR    __________ CHECK HERE IF PT HAS NO INSURANCE AND REQUESTING SELF PAY COST. *IF [facility] IS NOT A PROVIDER FOR THIS PATIENT, PLEASE FORWARD INFO VIA FAX TO [facility] @ [phone],(PHONE NUMBER: [phone]) TO RUN BENEFIT VERIFICATION AND NOTIFY THE ABOVE C3 OF THIS PLAN. (FAX FACE SHEET WITH DEMOGRAPHICS AND INSURANCE INFO WITH THIS FORM.)  PLEASE FAX BENEFIT INFO TO: THE [phone]    This message is intended only for the use of the individual or entity to which it is addressed and may contain information that is privileged, confidential, and exempt from disclosure under applicable law.  If the reader of the notice is not intended recipient of the employee/agent responsible for delivering the message to the intended recipient, you are hereby notified than any dissemination, distribution or copying of this communication is strictly prohibited. Please contact the sender for further instructions on handling the information.

## 2023-02-02 NOTE — PROGRESS NOTES
INPATIENT PROGRESS NOTES    PATIENT NAME: Cher Rosales  MRN: [id number]  SERVICE DATE:  February 2, 2023   SERVICE TIME:  1:15 PM      PRIMARY SERVICE: Pulmonary Disease    CHIEF COMPLAIN: Pneumonia, right upper lobe      INTERVAL HPI: Patient seen and examined at bedside, Interval Notes, orders reviewed. Nursing notes noted  Patient said  fever improved but he still having cough with  bloodstained mucus. I looked at tissue where he coughed up bloody mucus and it appears that he does not have a significant blood and mucus. No chest pain or pleuritic pain. No fever or chills. No shortness of breath. OBJECTIVE    Body mass index is 29.15 kg/m². PHYSICAL EXAM:  Vitals:  /72   Pulse 96   Temp 98.5 °F (36.9 °C) (Oral)   Resp 18   Ht 6' 2\" (1.88 m)   Wt 227 lb (103 kg)   SpO2 99%   BMI 29.15 kg/m²   General: Alert, awake . comfortable in bed, No distress. Head: Atraumatic , Normocephalic   Eyes: PERRL. No sclera icterus. No conjunctival injection. No discharge   ENT: No nasal  discharge. Pharynx clear. Neck:  Trachea midline. No thyromegaly, no JVD, No cervical adenopathy. Chest : Bilaterally symmetrical ,Normal effort,  No accessory muscle use  Lung : . Fair BS bilateral, decreased BS at bases. No Rales. No wheezing. No rhonchi. Heart[de-identified] Normal  rate. Regular rhythm. No mumur ,  Rub or gallop  ABD: Non-tender. Non-distended. No masses. No organmegaly. Normal bowel sounds. No hernia.   Ext : No Pitting both leg , No Cyanosis No clubbing  Neuro: no focal weakness          DATA:   Recent Labs     02/01/23 0512 02/02/23 0522   WBC 8.0 9.7   HGB 13.2* 12.7*   HCT 39.0* 37.6*   MCV 87.4 85.9    183     Recent Labs     01/31/23  2345 02/01/23  0007 02/01/23 0512 02/02/23 0522   *  --  136 136   K 4.3  --  4.7 3.6   CL 99  --  103 102   CO2 19*  --  22 21   BUN 14  --  14 21*   CREATININE 0.59*   < > 0.71 0.59*   GLUCOSE 186*  --  201* 124*   CALCIUM 8.7  --  8.5 8.5   PROT 7.5  --   -- --    LABALBU 3.8  --   --   --    BILITOT 0.7  --   --   --    ALKPHOS 85  --   --   --    AST 18  --   --   --    ALT 15  --   --   --    LABGLOM >60.0   < > >60.0 >60.0   GLOB 3.7*  --   --   --     < > = values in this interval not displayed. MV Settings:          No results for input(s): PHART, MSO9BHY, PO2ART, NOS2DUR, BEART, R6EMQVIY in the last 72 hours. O2 Device: None (Room air)    ADULT DIET; Regular; 4 carb choices (60 gm/meal)     MEDICATIONS during current hospitalization:    Continuous Infusions:   sodium chloride      dextrose         Scheduled Meds:   sodium chloride flush  5-40 mL IntraVENous 2 times per day    insulin lispro  0-8 Units SubCUTAneous TID WC    insulin lispro  0-4 Units SubCUTAneous Nightly    tuberculin  5 Units IntraDERmal Once    cefTRIAXone (ROCEPHIN) IV  1,000 mg IntraVENous Q24H    azithromycin  500 mg IntraVENous Q24H       PRN Meds:sodium chloride flush, sodium chloride, ondansetron **OR** ondansetron, polyethylene glycol, acetaminophen **OR** acetaminophen, ipratropium-albuterol, glucose, dextrose bolus **OR** dextrose bolus, glucagon (rDNA), dextrose    Radiology  CTA CHEST W WO CONTRAST    Result Date: 2/1/2023  EXAMINATION: CTA OF THE CHEST WITH AND WITHOUT CONTRAST 2/1/2023 12:52 am TECHNIQUE: CTA of the chest was performed before and after the administration of intravenous contrast.  Multiplanar reformatted images are provided for review. MIP images are provided for review. Automated exposure control, iterative reconstruction, and/or weight based adjustment of the mA/kV was utilized to reduce the radiation dose to as low as reasonably achievable. COMPARISON: None.  HISTORY: ORDERING SYSTEM PROVIDED HISTORY: Rule out PE TECHNOLOGIST PROVIDED HISTORY: Reason for exam:->Rule out PE Decision Support Exception - unselect if not a suspected or confirmed emergency medical condition->Emergency Medical Condition (MA) What reading provider will be dictating this exam?->CRC FINDINGS: Pulmonary Arteries: Exam is nondiagnostic for evaluation of pulmonary emboli due to the timing of the contrast bolus as well as motion artifact. No central filling defect identified. Main pulmonary artery is normal in caliber. Mediastinum: There is a 2.4 cm right hilar lymph node. Borderline lymph nodes seen in the right paratracheal region measuring 1 cm. Cardiac chambers are within normal limits. No pericardial effusion. No significant coronary artery calcification identified. Lungs/pleura: Dense consolidative infiltrate identified posteriorly within the right upper lobe. Underlying lesion at this time cannot be completely excluded. Follow-up imaging to resolution is recommended. Some fiber nodular density seen extending into the right upper lung field favoring an infectious process. No definite pleural effusion or pneumothorax. Upper Abdomen: Limited images of the upper abdomen are unremarkable. Soft Tissues/Bones: No acute bone or soft tissue abnormality. Multilevel degenerative changes seen within the spine. No acute or aggressive osseous abnormality. No acute chest wall abnormality. The exam is nondiagnostic for evaluation for pulmonary embolism. Dense consolidative infiltrate posteriorly within the right upper lobe. Findings favor an infectious process however underlying mass at this time cannot be completely excluded. Follow-up to resolution is recommended. XR CHEST PORTABLE    Result Date: 2/1/2023  EXAMINATION: ONE XRAY VIEW OF THE CHEST 1/31/2023 11:49 pm COMPARISON: None. HISTORY: ORDERING SYSTEM PROVIDED HISTORY: Sepsis TECHNOLOGIST PROVIDED HISTORY: Reason for exam:->Sepsis What reading provider will be dictating this exam?->CRC FINDINGS: Normal cardiomediastinal silhouette. Right mid lung airspace disease suggesting localized pneumonitis. No pneumothorax or effusion. Osseous thorax intact.      Right mid lung airspace disease suggesting localized pneumonitis. No effusions. RECOMMENDATION: Careful clinical correlation and follow up recommended. IMPRESSION AND SUGGESTION:  Right upper lobe pneumonia, likely community-acquired, rule out TB, rule out underlying lung mass  Hemoptysis secondary to above. GERD  Diabetes mellitus  Anxiety      Patient had CT chest done showed no pulmonary embolism adhesions consolidation infiltration posteriorly within the right upper lobe finding favor infectious process. Underlying mass cannot be ruled out he has been coughing bloody mucus in last 2 days but he has been coughing yellow mucus most of the time with fever likely community-acquired pneumonia if does not clear up need bronchoscopy. He is currently on IV Rocephin and Zithromax. He is on isolation for TB precaution. NOTE: This report was transcribed using voice recognition software. Every effort was made to ensure accuracy; however, inadvertent computerized transcription errors may be present.       Electronically signed by Ashok Denver, MD, FCCP on 2/2/2023 at 1:15 PM

## 2023-02-02 NOTE — PROGRESS NOTES
Infectious Diseases Inpatient Progress Note          HISTORY OF PRESENT ILLNESS:  Follow up right upper lobe pneumonia, community-acquired with persistent hemoptysis, resolved fevers on IV Rocephin and Zithromax, well tolerated. He denies any pain. Reports good appetite. No shortness of breath. Current Medications:     sodium chloride flush  5-40 mL IntraVENous 2 times per day    insulin lispro  0-8 Units SubCUTAneous TID WC    insulin lispro  0-4 Units SubCUTAneous Nightly    cefTRIAXone (ROCEPHIN) IV  1,000 mg IntraVENous Q24H    azithromycin  500 mg IntraVENous Q24H       Allergies:  Zosyn [piperacillin sod-tazobactam so]      Review of Systems  14 system review is negative other than HPI    Physical Exam  Vitals:    02/01/23 2104 02/02/23 0845 02/02/23 1209 02/02/23 1439   BP: 125/75 106/78 116/72 121/76   Pulse: 76 90 96 73   Resp:  18 18 18   Temp: 98.6 °F (37 °C) 98.5 °F (36.9 °C) 98.5 °F (36.9 °C) 98 °F (36.7 °C)   TempSrc: Oral Oral Oral Oral   SpO2: 99% 98% 99% 100%   Weight:       Height:         General Appearance: alert and oriented to person, place and time, well-developed and well-nourished, in no acute distress  Skin: warm and dry, no rash. Head: normocephalic and atraumatic  Eyes: anicteric sclerae  ENT: oropharynx clear and moist with normal mucous membranes.  No oral thrush  Lungs: normal respiratory effort, clear lungs  Heart normal S1-S2 no murmur  Abdomen: soft, no tenderness  No leg edema  No erythema, no tenderness      DATA:    Lab Results   Component Value Date    WBC 9.7 02/02/2023    HGB 12.7 (L) 02/02/2023    HCT 37.6 (L) 02/02/2023    MCV 85.9 02/02/2023     02/02/2023     Lab Results   Component Value Date    CREATININE 0.59 (L) 02/02/2023    BUN 21 (H) 02/02/2023     02/02/2023    K 3.6 02/02/2023     02/02/2023    CO2 21 02/02/2023       Hepatic Function Panel:  Lab Results   Component Value Date/Time    ALKPHOS 85 01/31/2023 11:45 PM    ALT 15 01/31/2023 11:45 PM    AST 18 01/31/2023 11:45 PM    PROT 7.5 01/31/2023 11:45 PM    BILITOT 0.7 01/31/2023 11:45 PM    LABALBU 3.8 01/31/2023 11:45 PM       Microbiology:   Recent Labs     02/01/23  0004   BC No Growth to date. Any change in status will be called. Recent Labs     02/01/23  0010   BLOODCULT2 No Growth to date. Any change in status will be called. IMPRESSION:    Right upper lobe pneumonia  Hemoptysis and anemia  History of travel to [facility]    Patient Active Problem List   Diagnosis    Type 2 diabetes mellitus with complication, without long-term current use of insulin (HCC)    Obesity (BMI 30-39. 9)    Low HDL (under 40)    Erectile dysfunction associated with type 2 diabetes mellitus (HCC)    Diverticulosis of large intestine    Albuminuria    Vitamin D deficiency    Gastroesophageal reflux disease without esophagitis    BPH without urinary obstruction    Varicose veins of both lower extremities with inflammation    Flexural atopic dermatitis    Yeast infection of the skin    Cough with hemoptysis    Pneumonia of right upper lobe due to infectious organism       PLAN:  Continue IV Rocephin and Zithromax  May switch to oral antibiotics tomorrow if patient continues to be afebrile  Check sputum culture and AFB  Check TB QuantiFERON and HIV screen  Check urine pneumococcal antigen and Legionella antigen  Follow-up blood cultures  Check MRSA nasal screen  Follow-up with pulmonary for hemoptysis    Discussed with patient and DAVID Tinsley MD

## 2023-02-03 ENCOUNTER — APPOINTMENT (OUTPATIENT)
Dept: GENERAL RADIOLOGY | Age: 53
DRG: 871 | End: 2023-02-03
Payer: COMMERCIAL

## 2023-02-03 VITALS
TEMPERATURE: 98.1 F | DIASTOLIC BLOOD PRESSURE: 72 MMHG | RESPIRATION RATE: 18 BRPM | HEART RATE: 65 BPM | OXYGEN SATURATION: 100 % | BODY MASS INDEX: 29.13 KG/M2 | WEIGHT: 227 LBS | HEIGHT: 74 IN | SYSTOLIC BLOOD PRESSURE: 118 MMHG

## 2023-02-03 LAB
ANION GAP SERPL CALCULATED.3IONS-SCNC: 12 MEQ/L (ref 9–15)
BASOPHILS ABSOLUTE: 0 K/UL (ref 0–0.2)
BASOPHILS RELATIVE PERCENT: 0.3 %
BUN BLDV-MCNC: 22 MG/DL (ref 6–20)
CALCIUM SERPL-MCNC: 8.2 MG/DL (ref 8.5–9.9)
CHLORIDE BLD-SCNC: 106 MEQ/L (ref 95–107)
CO2: 23 MEQ/L (ref 20–31)
CREAT SERPL-MCNC: 0.58 MG/DL (ref 0.7–1.2)
EOSINOPHILS ABSOLUTE: 0.1 K/UL (ref 0–0.7)
EOSINOPHILS RELATIVE PERCENT: 1 %
GFR SERPL CREATININE-BSD FRML MDRD: >60 ML/MIN/{1.73_M2}
GLUCOSE BLD-MCNC: 135 MG/DL (ref 70–99)
GLUCOSE BLD-MCNC: 153 MG/DL (ref 70–99)
GLUCOSE BLD-MCNC: 177 MG/DL (ref 70–99)
GLUCOSE BLD-MCNC: 187 MG/DL (ref 70–99)
GLUCOSE BLD-MCNC: 209 MG/DL (ref 70–99)
HCT VFR BLD CALC: 36.5 % (ref 42–52)
HEMOGLOBIN: 12.3 G/DL (ref 14–18)
HIV AG/AB: NONREACTIVE
INR BLD: 1.1
LACTIC ACID: 0.9 MMOL/L (ref 0.5–2.2)
LYMPHOCYTES ABSOLUTE: 1 K/UL (ref 1–4.8)
LYMPHOCYTES RELATIVE PERCENT: 14.8 %
MCH RBC QN AUTO: 29 PG (ref 27–31.3)
MCHC RBC AUTO-ENTMCNC: 33.8 % (ref 33–37)
MCV RBC AUTO: 85.9 FL (ref 79–92.2)
MONOCYTES ABSOLUTE: 0.7 K/UL (ref 0.2–0.8)
MONOCYTES RELATIVE PERCENT: 10.5 %
MRSA, DNA, NASAL: NEGATIVE
NEUTROPHILS ABSOLUTE: 4.8 K/UL (ref 1.4–6.5)
NEUTROPHILS RELATIVE PERCENT: 73.4 %
PDW BLD-RTO: 14.5 % (ref 11.5–14.5)
PERFORMED ON: ABNORMAL
PLATELET # BLD: 177 K/UL (ref 130–400)
POTASSIUM REFLEX MAGNESIUM: 3.8 MEQ/L (ref 3.4–4.9)
PROCALCITONIN: 0.24 NG/ML (ref 0–0.15)
PROTHROMBIN TIME: 14.1 SEC (ref 12.3–14.9)
RBC # BLD: 4.25 M/UL (ref 4.7–6.1)
SODIUM BLD-SCNC: 141 MEQ/L (ref 135–144)
SPECIMEN DESCRIPTION: NORMAL
WBC # BLD: 6.6 K/UL (ref 4.8–10.8)

## 2023-02-03 PROCEDURE — 87116 MYCOBACTERIA CULTURE: CPT

## 2023-02-03 PROCEDURE — 99232 SBSQ HOSP IP/OBS MODERATE 35: CPT | Performed by: INTERNAL MEDICINE

## 2023-02-03 PROCEDURE — 85025 COMPLETE CBC W/AUTO DIFF WBC: CPT

## 2023-02-03 PROCEDURE — 2580000003 HC RX 258: Performed by: INTERNAL MEDICINE

## 2023-02-03 PROCEDURE — 6360000002 HC RX W HCPCS: Performed by: INTERNAL MEDICINE

## 2023-02-03 PROCEDURE — 36415 COLL VENOUS BLD VENIPUNCTURE: CPT

## 2023-02-03 PROCEDURE — 80048 BASIC METABOLIC PNL TOTAL CA: CPT

## 2023-02-03 PROCEDURE — 84145 PROCALCITONIN (PCT): CPT

## 2023-02-03 PROCEDURE — 6370000000 HC RX 637 (ALT 250 FOR IP): Performed by: INTERNAL MEDICINE

## 2023-02-03 PROCEDURE — 71046 X-RAY EXAM CHEST 2 VIEWS: CPT

## 2023-02-03 PROCEDURE — 85610 PROTHROMBIN TIME: CPT

## 2023-02-03 PROCEDURE — 83605 ASSAY OF LACTIC ACID: CPT

## 2023-02-03 RX ORDER — GUAIFENESIN 600 MG/1
600 TABLET, EXTENDED RELEASE ORAL 2 TIMES DAILY
Qty: 60 TABLET | Refills: 0 | Status: SHIPPED | OUTPATIENT
Start: 2023-02-03

## 2023-02-03 RX ORDER — PANTOPRAZOLE SODIUM 40 MG/1
40 TABLET, DELAYED RELEASE ORAL
Status: DISCONTINUED | OUTPATIENT
Start: 2023-02-03 | End: 2023-02-03 | Stop reason: HOSPADM

## 2023-02-03 RX ORDER — ENOXAPARIN SODIUM 100 MG/ML
30 INJECTION SUBCUTANEOUS 2 TIMES DAILY
Status: DISCONTINUED | OUTPATIENT
Start: 2023-02-03 | End: 2023-02-03 | Stop reason: HOSPADM

## 2023-02-03 RX ORDER — CEFUROXIME AXETIL 500 MG/1
500 TABLET ORAL 2 TIMES DAILY
Qty: 20 TABLET | Refills: 0 | Status: SHIPPED | OUTPATIENT
Start: 2023-02-03 | End: 2023-02-13

## 2023-02-03 RX ORDER — PRAVASTATIN SODIUM 10 MG
10 TABLET ORAL NIGHTLY
Status: DISCONTINUED | OUTPATIENT
Start: 2023-02-03 | End: 2023-02-03 | Stop reason: HOSPADM

## 2023-02-03 RX ORDER — CEFUROXIME AXETIL 500 MG/1
500 TABLET ORAL 2 TIMES DAILY
Qty: 20 TABLET | Refills: 0 | Status: SHIPPED | OUTPATIENT
Start: 2023-02-03 | End: 2023-02-03 | Stop reason: SDUPTHER

## 2023-02-03 RX ORDER — LISINOPRIL 5 MG/1
5 TABLET ORAL DAILY
Status: DISCONTINUED | OUTPATIENT
Start: 2023-02-03 | End: 2023-02-03 | Stop reason: HOSPADM

## 2023-02-03 RX ADMIN — SODIUM CHLORIDE, PRESERVATIVE FREE 10 ML: 5 INJECTION INTRAVENOUS at 08:39

## 2023-02-03 RX ADMIN — CEFTRIAXONE SODIUM 1000 MG: 1 INJECTION, POWDER, FOR SOLUTION INTRAMUSCULAR; INTRAVENOUS at 15:10

## 2023-02-03 RX ADMIN — LISINOPRIL 5 MG: 5 TABLET ORAL at 08:39

## 2023-02-03 RX ADMIN — ENOXAPARIN SODIUM 40 MG: 100 INJECTION SUBCUTANEOUS at 08:39

## 2023-02-03 RX ADMIN — PANTOPRAZOLE SODIUM 40 MG: 40 TABLET, DELAYED RELEASE ORAL at 08:40

## 2023-02-03 RX ADMIN — PRAVASTATIN SODIUM 10 MG: 10 TABLET ORAL at 20:50

## 2023-02-03 RX ADMIN — AZITHROMYCIN DIHYDRATE 500 MG: 500 INJECTION, POWDER, LYOPHILIZED, FOR SOLUTION INTRAVENOUS at 15:50

## 2023-02-03 RX ADMIN — GUAIFENESIN 600 MG: 600 TABLET, EXTENDED RELEASE ORAL at 20:50

## 2023-02-03 RX ADMIN — INSULIN LISPRO 2 UNITS: 100 INJECTION, SOLUTION INTRAVENOUS; SUBCUTANEOUS at 11:44

## 2023-02-03 RX ADMIN — GUAIFENESIN 600 MG: 600 TABLET, EXTENDED RELEASE ORAL at 08:39

## 2023-02-03 ASSESSMENT — PAIN SCALES - GENERAL: PAINLEVEL_OUTOF10: 0

## 2023-02-03 NOTE — PROGRESS NOTES
INPATIENT PROGRESS NOTES    PATIENT NAME: Jenny Stiles  MRN: [id number]  SERVICE DATE:  February 3, 2023   SERVICE TIME:  8:39 AM      PRIMARY SERVICE: Pulmonary Disease    CHIEF COMPLAIN: Pneumonia, right upper lobe      INTERVAL HPI: Patient seen and examined at bedside, Interval Notes, orders reviewed. Nursing notes noted  Patient said  he is feeling better . No fever . Very minimal blood stain mucus. No chest pain or pleuritic pain. No fever or chills. No shortness of breath. OBJECTIVE    Body mass index is 29.15 kg/m². PHYSICAL EXAM:  Vitals:  /66   Pulse 77   Temp 98 °F (36.7 °C) (Oral)   Resp 18   Ht 6' 2\" (1.88 m)   Wt 227 lb (103 kg)   SpO2 99%   BMI 29.15 kg/m²   General: Alert, awake . comfortable in bed, No distress. Head: Atraumatic , Normocephalic   Eyes: PERRL. No sclera icterus. No conjunctival injection. No discharge   ENT: No nasal  discharge. Pharynx clear. Neck:  Trachea midline. No thyromegaly, no JVD, No cervical adenopathy. Chest : Bilaterally symmetrical ,Normal effort,  No accessory muscle use  Lung : . Fair BS bilateral, decreased BS at bases. No Rales. No wheezing. No rhonchi. Heart[de-identified] Normal  rate. Regular rhythm. No mumur ,  Rub or gallop  ABD: Non-tender. Non-distended. No masses. No organmegaly. Normal bowel sounds. No hernia.   Ext : No Pitting both leg , No Cyanosis No clubbing  Neuro: no focal weakness          DATA:   Recent Labs     02/02/23 0522 02/03/23 0514   WBC 9.7 6.6   HGB 12.7* 12.3*   HCT 37.6* 36.5*   MCV 85.9 85.9    177     Recent Labs     01/31/23  2345 02/01/23  0007 02/02/23 0522 02/03/23  0514   *   < > 136 141   K 4.3   < > 3.6 3.8   CL 99   < > 102 106   CO2 19*   < > 21 23   BUN 14   < > 21* 22*   CREATININE 0.59*   < > 0.59* 0.58*   GLUCOSE 186*   < > 124* 153*   CALCIUM 8.7   < > 8.5 8.2*   PROT 7.5  --   --   --    LABALBU 3.8  --   --   --    BILITOT 0.7  --   --   --    ALKPHOS 85  --   --   --    AST 18  --   --   -- ALT 15  --   --   --    LABGLOM >60.0   < > >60.0 >60.0   GLOB 3.7*  --   --   --     < > = values in this interval not displayed. MV Settings:          No results for input(s): PHART, YLA0SNF, PO2ART, FZH4ICZ, BEART, N4NMDWIZ in the last 72 hours. O2 Device: None (Room air)    ADULT DIET; Regular; 4 carb choices (60 gm/meal)     MEDICATIONS during current hospitalization:    Continuous Infusions:   sodium chloride      dextrose         Scheduled Meds:   lisinopril  5 mg Oral Daily    pantoprazole  40 mg Oral QAM AC    pravastatin  10 mg Oral Nightly    guaiFENesin  600 mg Oral BID    enoxaparin  40 mg SubCUTAneous Daily    sodium chloride flush  5-40 mL IntraVENous 2 times per day    insulin lispro  0-8 Units SubCUTAneous TID WC    insulin lispro  0-4 Units SubCUTAneous Nightly    cefTRIAXone (ROCEPHIN) IV  1,000 mg IntraVENous Q24H    azithromycin  500 mg IntraVENous Q24H       PRN Meds:sodium chloride flush, sodium chloride, ondansetron **OR** ondansetron, polyethylene glycol, acetaminophen **OR** acetaminophen, ipratropium-albuterol, glucose, dextrose bolus **OR** dextrose bolus, glucagon (rDNA), dextrose    Radiology  CTA CHEST W WO CONTRAST    Result Date: 2/1/2023  EXAMINATION: CTA OF THE CHEST WITH AND WITHOUT CONTRAST 2/1/2023 12:52 am TECHNIQUE: CTA of the chest was performed before and after the administration of intravenous contrast.  Multiplanar reformatted images are provided for review. MIP images are provided for review. Automated exposure control, iterative reconstruction, and/or weight based adjustment of the mA/kV was utilized to reduce the radiation dose to as low as reasonably achievable. COMPARISON: None.  HISTORY: ORDERING SYSTEM PROVIDED HISTORY: Rule out PE TECHNOLOGIST PROVIDED HISTORY: Reason for exam:->Rule out PE Decision Support Exception - unselect if not a suspected or confirmed emergency medical condition->Emergency Medical Condition (MA) What reading provider will be dictating this exam?->CRC FINDINGS: Pulmonary Arteries: Exam is nondiagnostic for evaluation of pulmonary emboli due to the timing of the contrast bolus as well as motion artifact. No central filling defect identified. Main pulmonary artery is normal in caliber. Mediastinum: There is a 2.4 cm right hilar lymph node. Borderline lymph nodes seen in the right paratracheal region measuring 1 cm. Cardiac chambers are within normal limits. No pericardial effusion. No significant coronary artery calcification identified. Lungs/pleura: Dense consolidative infiltrate identified posteriorly within the right upper lobe. Underlying lesion at this time cannot be completely excluded. Follow-up imaging to resolution is recommended. Some fiber nodular density seen extending into the right upper lung field favoring an infectious process. No definite pleural effusion or pneumothorax. Upper Abdomen: Limited images of the upper abdomen are unremarkable. Soft Tissues/Bones: No acute bone or soft tissue abnormality. Multilevel degenerative changes seen within the spine. No acute or aggressive osseous abnormality. No acute chest wall abnormality. The exam is nondiagnostic for evaluation for pulmonary embolism. Dense consolidative infiltrate posteriorly within the right upper lobe. Findings favor an infectious process however underlying mass at this time cannot be completely excluded. Follow-up to resolution is recommended. XR CHEST PORTABLE    Result Date: 2/1/2023  EXAMINATION: ONE XRAY VIEW OF THE CHEST 1/31/2023 11:49 pm COMPARISON: None. HISTORY: ORDERING SYSTEM PROVIDED HISTORY: Sepsis TECHNOLOGIST PROVIDED HISTORY: Reason for exam:->Sepsis What reading provider will be dictating this exam?->CRC FINDINGS: Normal cardiomediastinal silhouette. Right mid lung airspace disease suggesting localized pneumonitis. No pneumothorax or effusion. Osseous thorax intact.      Right mid lung airspace disease suggesting localized pneumonitis. No effusions. RECOMMENDATION: Careful clinical correlation and follow up recommended. IMPRESSION AND SUGGESTION:  Right upper lobe pneumonia, likely community-acquired, rule out TB, rule out underlying lung mass  Hemoptysis secondary to above. GERD  Diabetes mellitus  Anxiety      On IV rocephin and Zithromax, likley community-acquired pneumonia if does not clear up need bronchoscopy. on IV Rocephin and Zithromax. He is on isolation for TB precaution but my clinical suspicious is very  low for TB. 3 AFB done , result pending . PPD id negative. CXR today . If TB [place] negative , when ok by ID , may d/c with po antibiotics and f/u with  me in 1-2 weeks . NOTE: This report was transcribed using voice recognition software. Every effort was made to ensure accuracy; however, inadvertent computerized transcription errors may be present.       Electronically signed by Magali Garcia MD, FCCP on 2/3/2023 at 8:39 AM

## 2023-02-03 NOTE — FLOWSHEET NOTE
0431- Assessment complete. Patient alert and oriented X 4. Patient up independently in the room. Medications given as ordered. Vital signs stable. Patient states he has not coughed up blood today. Call light in reach. 36- Spoke with Dr. Callie Abarca who states patient may be discharged from her standpoint however needs to wear a N95 mask leaving the hospital and quarantine at home until the TB test results come back and her office notify's the patient. Dr. Callie Abarca states she is going to speak with Dr. Argenis Dc regarding patient's plan of care. 1600- Dr. Lily Hutchinson aware of patient's CXR results from today. Per Dr. Lily Hutchinson patient may be discharged tomorrow from his standpoint if patient doesn't have anymore hemoptysis or fever. Electronically signed by Indigo Plummer on 2/3/2023 at 4:36 PM

## 2023-02-03 NOTE — PROGRESS NOTES
Hospitalist Daily Progress Note  Name: Karen Rodriguez  Age: 48 y.o. Gender: male  CodeStatus: Full Code  Allergies: Zosyn [Piperacillin Sod-Tazobactam So]    Chief Complaint:Chest Pain (X2 Days) and Cough (With blood x 1 month)    Primary Care Provider: Sada Corral PA-C  InpatientTreatment Team: Treatment Team: Attending Provider: Debi Osullivan DO; Consulting Physician: Gina Bonilla MD; Consulting Physician: Lopez Lee MD; Registered Nurse: Zulma Morris RN  Admission Date: 1/31/2023      Subjective: Patient is seen evaluated bedside. PPD is negative at this point. Patient discussed with infectious disease over the phone. Patient has persistent bloody mucus purulent sputum. HIV pending. remains on azithromycin and Rocephin. AFB/TB Gold pending    Physical Exam  Constitutional:       Appearance: Normal appearance. He is not ill-appearing or diaphoretic. HENT:      Head: Normocephalic and atraumatic. Nose: Nose normal.      Mouth/Throat:      Mouth: Mucous membranes are moist.      Pharynx: Oropharynx is clear. Eyes:      Conjunctiva/sclera: Conjunctivae normal.      Pupils: Pupils are equal, round, and reactive to light. Cardiovascular:      Rate and Rhythm: Normal rate. Heart sounds: No murmur heard. No friction rub. No gallop. Pulmonary:      Breath sounds: Rales present. No wheezing or rhonchi. Abdominal:      General: There is no distension. Tenderness: There is no abdominal tenderness. There is no guarding. Musculoskeletal:         General: No swelling. Right lower leg: No edema. Left lower leg: No edema. Neurological:      General: No focal deficit present. Mental Status: He is alert and oriented to person, place, and time.    Psychiatric:         Mood and Affect: Mood normal.         Behavior: Behavior normal.       Review of Systems  14 point ROS reviewed negative except for as above  Medications:  Reviewed    Infusion Medications:    sodium chloride      dextrose       Scheduled Medications:    sodium chloride flush  5-40 mL IntraVENous 2 times per day    insulin lispro  0-8 Units SubCUTAneous TID WC    insulin lispro  0-4 Units SubCUTAneous Nightly    cefTRIAXone (ROCEPHIN) IV  1,000 mg IntraVENous Q24H    azithromycin  500 mg IntraVENous Q24H     PRN Meds: sodium chloride flush, sodium chloride, ondansetron **OR** ondansetron, polyethylene glycol, acetaminophen **OR** acetaminophen, ipratropium-albuterol, glucose, dextrose bolus **OR** dextrose bolus, glucagon (rDNA), dextrose    Labs:   Recent Labs     01/31/23  2345 02/01/23  0512 02/02/23  0522   WBC 8.4 8.0 9.7   HGB 14.0 13.2* 12.7*   HCT 41.6* 39.0* 37.6*    162 183     Recent Labs     01/31/23  2345 02/01/23  0007 02/01/23  0512 02/02/23  0522   *  --  136 136   K 4.3  --  4.7 3.6   CL 99  --  103 102   CO2 19*  --  22 21   BUN 14  --  14 21*   CREATININE 0.59* 0.7* 0.71 0.59*   CALCIUM 8.7  --  8.5 8.5     Recent Labs     01/31/23  2345   AST 18   ALT 15   BILITOT 0.7   ALKPHOS 85     Recent Labs     02/01/23  0302 02/02/23  0522   INR 1.2 1.1     Recent Labs     01/31/23  2345   TROPONINI <0.010       Urinalysis:   Lab Results   Component Value Date/Time    NITRU Negative 09/17/2019 11:07 AM    BLOODU Negative 09/17/2019 11:07 AM    SPECGRAV 1.021 09/17/2019 11:07 AM    GLUCOSEU 500 09/17/2019 11:07 AM       Radiology:   Most recent    Chest CT      WITH CONTRAST:No results found for this or any previous visit. WITHOUT CONTRAST: No results found for this or any previous visit. CXR      2-view: No results found for this or any previous visit. Portable: Results for orders placed during the hospital encounter of 01/31/23    XR CHEST PORTABLE    Narrative  EXAMINATION:  ONE XRAY VIEW OF THE CHEST    1/31/2023 11:49 pm    COMPARISON:  None.     HISTORY:  ORDERING SYSTEM PROVIDED HISTORY: Sepsis  TECHNOLOGIST PROVIDED HISTORY:  Reason for exam:->Sepsis  What reading provider will be dictating this exam?->CRC    FINDINGS:  Normal cardiomediastinal silhouette. Right mid lung airspace disease  suggesting localized pneumonitis. No pneumothorax or effusion. Osseous  thorax intact. Impression  Right mid lung airspace disease suggesting localized pneumonitis. No  effusions. RECOMMENDATION:  Careful clinical correlation and follow up recommended. Echo No results found for this or any previous visit. Assessment/Plan:    Active Hospital Problems    Diagnosis Date Noted    Cough with hemoptysis [R04.2] 02/01/2023     Priority: Medium    Pneumonia of right lung due to infectious organism [J18.9] 02/01/2023     Priority: Medium     Right upper lobe pneumonia: ID, pulmonary managing. TB being ruled out, Mantoux test negative, awaiting TB Gold sputum cultures. Waiting for HIV results. Ceftriaxone and azithromycin. Add Mucinex Acapella incentive spirometer. Follow-up procalcitonin. Continue bronchodilators. Awaiting urine strep, Legionella antigens. Type 2 diabetes: Sliding scale coverage    GERD PPI    DVT prophylaxis with Lovenox    Additional work up or/and treatment plan may be added today or then after based on clinical progression. I am managing a portion of pt care. Some medical issues are handled byother specialists. Additional work up and treatment should be done in out pt setting by pt PCP and other out pt providers. In addition to examining and evaluating pt, I spent additional time explaining care, normaland abnormal findings, and treatment plan. All of pt questions were answered. Counseling, diet and education were provided. Case will be discussed with nursing staff when appropriate. Family will be updated if and whenappropriate.       Electronically signed by Uvaldo Linn DO on 2/2/2023 at 9:13 PM

## 2023-02-03 NOTE — CARE COORDINATION
Patient admitted with pneumonia and is currently in isolation. At this time TB diagnosis is not confirmed or disproved, so pneumonia booklet and zone pamphlet mailed to patient residence for his review.  Electronically signed by Leigh Hubbard RN on 2/3/2023 at 1:55 PM

## 2023-02-03 NOTE — CARE COORDINATION
Patient is from home. No needs identified by nurse. Several attempts made to call patient in his room to complete assessment with no answer.

## 2023-02-03 NOTE — PROGRESS NOTES
Physician Progress Note      Jessica Enriquez  CSN #:                  [id number]  :                       1970  ADMIT DATE:       2023 11:28 PM  [address] [place] DATE:  Yolanda Casey  PROVIDER #:        Eileen Gates DO          QUERY TEXT:    Pt admitted with RUL pneumonia. On admission T 102.2 HR  RR 22-30   Procalcitonin 0.54. If possible, please document in the progress notes and   discharge summary if you are evaluating and /or treating any of the following: The medical record reflects the following:  Risk Factors: pneumonia, DM, obesity  Clinical Indicators: On admission T 102.2 HR  RR 22-30 Procalcitonin   0.54. WBC 8.4/8.0, Lactic acid 0.9/1.0  CTA chest shows dense consolidative infiltrate posteriorly within the right   upper lobe. CXR shows right mid lung airspace disease suggesting localized   pneumonitis. Treatment: IVF 30ml/kg x1, Tylenol, Merrem/Zosyn/Vanco x1, Zithromax,   Rocephin, cultures, TB/HIV testing, isolation    Thank you, Denisse Stein RN BSN [facility]  [phone]  Options provided:  -- Sepsis, present on admission  -- Pneumonia without Sepsis  -- Other - I will add my own diagnosis  -- Disagree - Not applicable / Not valid  -- Disagree - Clinically unable to determine / Unknown  -- Refer to Clinical Documentation Reviewer    PROVIDER RESPONSE TEXT:    This patient has sepsis which was present on admission.     Query created by: Gabriela Oleary on 2/3/2023 8:22 AM      Electronically signed by:  Eileen Gates DO 2/3/2023 9:26 AM

## 2023-02-03 NOTE — PROGRESS NOTES
Infectious Diseases Inpatient Progress Note          HISTORY OF PRESENT ILLNESS:  Follow up right upper lobe pneumonia, community-acquired with resolved hemoptysis, resolved fevers on IV Rocephin and Zithromax, well tolerated. He denies any pain. Reports good appetite. No shortness of breath. PPD is negative at 48 hrs  Current Medications:     lisinopril  5 mg Oral Daily    pantoprazole  40 mg Oral QAM AC    pravastatin  10 mg Oral Nightly    enoxaparin  30 mg SubCUTAneous BID    guaiFENesin  600 mg Oral BID    sodium chloride flush  5-40 mL IntraVENous 2 times per day    insulin lispro  0-8 Units SubCUTAneous TID WC    insulin lispro  0-4 Units SubCUTAneous Nightly    cefTRIAXone (ROCEPHIN) IV  1,000 mg IntraVENous Q24H    azithromycin  500 mg IntraVENous Q24H       Allergies:  Zosyn [piperacillin sod-tazobactam so]      Review of Systems  14 system review is negative other than HPI    Physical Exam  Vitals:    02/02/23 1209 02/02/23 1439 02/02/23 2059 02/03/23 0835   BP: 116/72 121/76 104/67 117/66   Pulse: 96 73 85 77   Resp: 18 18 18 18   Temp: 98.5 °F (36.9 °C) 98 °F (36.7 °C) 98.6 °F (37 °C) 98 °F (36.7 °C)   TempSrc: Oral Oral Oral Oral   SpO2: 99% 100% 96% 99%   Weight:       Height:         General Appearance: alert and oriented to person, place and time, well-developed and well-nourished, in no acute distress, on RA  Skin: warm and dry, no rash. Head: normocephalic and atraumatic  Eyes: anicteric sclerae  ENT: oropharynx clear and moist with normal mucous membranes.  No oral thrush  Lungs: normal respiratory effort, clear lungs, diminished BS at R apex  Heart normal S1-S2 no murmur  Abdomen: soft, no tenderness  No leg edema  No erythema, no tenderness      DATA:    Lab Results   Component Value Date    WBC 6.6 02/03/2023    HGB 12.3 (L) 02/03/2023    HCT 36.5 (L) 02/03/2023    MCV 85.9 02/03/2023     02/03/2023     Lab Results   Component Value Date    CREATININE 0.58 (L) 02/03/2023    BUN 22 (H) 02/03/2023     02/03/2023    K 3.8 02/03/2023     02/03/2023    CO2 23 02/03/2023       Hepatic Function Panel:  Lab Results   Component Value Date/Time    ALKPHOS 85 01/31/2023 11:45 PM    ALT 15 01/31/2023 11:45 PM    AST 18 01/31/2023 11:45 PM    PROT 7.5 01/31/2023 11:45 PM    BILITOT 0.7 01/31/2023 11:45 PM    LABALBU 3.8 01/31/2023 11:45 PM       Microbiology:   Recent Labs     02/01/23  0004   BC No Growth to date. Any change in status will be called. Recent Labs     02/01/23  0010   BLOODCULT2 No Growth to date. Any change in status will be called. IMPRESSION:    Right upper lobe pneumonia  Hemoptysis and anemia  History of travel to [facility]    Patient Active Problem List   Diagnosis    Type 2 diabetes mellitus with complication, without long-term current use of insulin (HCC)    Obesity (BMI 30-39. 9)    Low HDL (under 40)    Erectile dysfunction associated with type 2 diabetes mellitus (HCC)    Diverticulosis of large intestine    Albuminuria    Vitamin D deficiency    Gastroesophageal reflux disease without esophagitis    BPH without urinary obstruction    Varicose veins of both lower extremities with inflammation    Flexural atopic dermatitis    Yeast infection of the skin    Cough with hemoptysis    Pneumonia of right lung due to infectious organism       PLAN:  IV Rocephin and Zithromax   May discharge from infectious disease perspective on Ceftin 500 mg p.o. twice daily for 10 days  Follow-up with me in 2 weeks  Follow-up with pulmonary  Patient was instructed to stay quarantined in his room at home until he gets called next week with AFB stains and TB QuantiFERON results  Check sputum culture and AFB  Check TB QuantiFERON and HIV screen  Check urine pneumococcal antigen and Legionella antigen      Discussed with patient     Rhonda Duke MD

## 2023-02-04 LAB
CULTURE, RESPIRATORY: NORMAL
QUANTI TB GOLD PLUS: NEGATIVE
QUANTI TB1 MINUS NIL: 0 IU/ML (ref 0–0.34)
QUANTI TB2 MINUS NIL: 0 IU/ML (ref 0–0.34)
QUANTIFERON MITOGEN: 6.69 IU/ML
QUANTIFERON NIL: 0.09 IU/ML
STREP PNEUMO AG, UR: NEGATIVE

## 2023-02-04 NOTE — PROGRESS NOTES
D/c instructions given, IV removed, pm meds given prior to d/c, pt and family verbalize instructions

## 2023-02-04 NOTE — PLAN OF CARE
Problem: Discharge Planning  Goal: Discharge to home or other facility with appropriate resources  2/3/2023 2117 by Elia Thomas RN  Outcome: Completed  2/3/2023 1327 by Maritza Desai. Brandon Cardozo RN  Outcome: Progressing     Problem: Pain  Goal: Verbalizes/displays adequate comfort level or baseline comfort level  2/3/2023 2117 by Elia Thomas RN  Outcome: Completed  2/3/2023 1327 by Maritza Desai. Brandon Cardozo RN  Outcome: Progressing     Problem: Chronic Conditions and Co-morbidities  Goal: Patient's chronic conditions and co-morbidity symptoms are monitored and maintained or improved  2/3/2023 2117 by Elia Thomas RN  Outcome: Completed  2/3/2023 1327 by Maritza Desai.  Brandon Cardozo RN  Outcome: Progressing

## 2023-02-05 LAB
AFB STAIN: NORMAL
AFB STAIN: NORMAL
L. PNEUMOPHILA SEROGP 1 UR AG: NEGATIVE
PRELIMINARY: NORMAL
PRELIMINARY: NORMAL
QUANTI TB GOLD PLUS: NEGATIVE
QUANTI TB1 MINUS NIL: 0.01 IU/ML (ref 0–0.34)
QUANTI TB2 MINUS NIL: 0.01 IU/ML (ref 0–0.34)
QUANTIFERON MITOGEN: 9.45 IU/ML
QUANTIFERON NIL: 0.01 IU/ML

## 2023-02-06 ENCOUNTER — TELEPHONE (OUTPATIENT)
Dept: FAMILY MEDICINE CLINIC | Age: 53
End: 2023-02-06

## 2023-02-06 LAB
BLOOD CULTURE, ROUTINE: NORMAL
CULTURE, BLOOD 2: NORMAL

## 2023-02-06 NOTE — TELEPHONE ENCOUNTER
Care Transitions Initial Follow Up Call    Outreach made within 2 business days of discharge: Yes    Patient: Johnathon Morrison Patient : 1970   MRN: [id number]  Reason for Admission: There are no discharge diagnoses documented for the most recent discharge. Discharge Date: 2/3/23       Spoke with: Pt    Discharge department/facility: [facility] Patient Contact:  Was patient able to fill all prescriptions: Yes  Was patient instructed to bring all medications to the follow-up visit: Yes  Is patient taking all medications as directed in the discharge summary? Yes  Does patient understand their discharge instructions: Yes  Does patient have questions or concerns that need addressed prior to 7-14 day follow up office visit: no, Pt stated he will call if needed, but doesn't want to schedule right now.      Scheduled appointment with PCP within 7-14 days    Follow Up  Future Appointments   Date Time Provider Julissa Yarbrough   3/20/2023  8:30 AM Brody Buchanan [facility]   2023  9:00 AM MD Yuliana Martin [address]

## 2023-02-15 ENCOUNTER — OFFICE VISIT (OUTPATIENT)
Dept: INFECTIOUS DISEASES | Age: 53
End: 2023-02-15
Payer: COMMERCIAL

## 2023-02-15 VITALS
HEART RATE: 74 BPM | SYSTOLIC BLOOD PRESSURE: 126 MMHG | BODY MASS INDEX: 30.17 KG/M2 | DIASTOLIC BLOOD PRESSURE: 74 MMHG | WEIGHT: 235 LBS | TEMPERATURE: 97.9 F

## 2023-02-15 DIAGNOSIS — R04.2 HEMOPTYSIS: Primary | ICD-10-CM

## 2023-02-15 PROCEDURE — G8427 DOCREV CUR MEDS BY ELIG CLIN: HCPCS | Performed by: INTERNAL MEDICINE

## 2023-02-15 PROCEDURE — G8417 CALC BMI ABV UP PARAM F/U: HCPCS | Performed by: INTERNAL MEDICINE

## 2023-02-15 PROCEDURE — 1036F TOBACCO NON-USER: CPT | Performed by: INTERNAL MEDICINE

## 2023-02-15 PROCEDURE — 99214 OFFICE O/P EST MOD 30 MIN: CPT | Performed by: INTERNAL MEDICINE

## 2023-02-15 PROCEDURE — G8484 FLU IMMUNIZE NO ADMIN: HCPCS | Performed by: INTERNAL MEDICINE

## 2023-02-15 PROCEDURE — 1111F DSCHRG MED/CURRENT MED MERGE: CPT | Performed by: INTERNAL MEDICINE

## 2023-02-15 PROCEDURE — 3017F COLORECTAL CA SCREEN DOC REV: CPT | Performed by: INTERNAL MEDICINE

## 2023-02-15 ASSESSMENT — PATIENT HEALTH QUESTIONNAIRE - PHQ9
SUM OF ALL RESPONSES TO PHQ QUESTIONS 1-9: 0
SUM OF ALL RESPONSES TO PHQ QUESTIONS 1-9: 0
1. LITTLE INTEREST OR PLEASURE IN DOING THINGS: 0
SUM OF ALL RESPONSES TO PHQ QUESTIONS 1-9: 0
2. FEELING DOWN, DEPRESSED OR HOPELESS: 0
SUM OF ALL RESPONSES TO PHQ QUESTIONS 1-9: 0
SUM OF ALL RESPONSES TO PHQ9 QUESTIONS 1 & 2: 0

## 2023-02-15 NOTE — PROGRESS NOTES
Ck Rowley (:  1970) is a 48 y.o. male,Established patient, here for evaluation of the following chief complaint(s):  Pneumonia ([address] f/u - R upper lobe pneumonia )         ASSESSMENT/PLAN:  Right upper lobe pneumonia, community-acquired  Hemoptysis, rule out underlying cancer    Repeat chest x-ray  Follow-up with pulmonary  Patient may need bronchoscopy if persistent right upper lobe infiltrate to rule out atypical infection/malignancy  Follow-up as needed   Follow-up AFB cultures    Subjective   SUBJECTIVE/OBJECTIVE:  HPI  Follow-up [facility] hospitalization for right upper lobe community-acquired pneumonia, hemoptysis. Patient had allergic reaction to Zosyn with this rash when he was in the emergency room. Was treated with IV meropenem for few days and was discharged home on Ceftin 500 mg p.o. twice daily for 10 days. He has 1 more pill to take. Ceftin was well-tolerated. Patient reports feeling much better. Resolved cough. Had 1 bout of hemoptysis last Friday. TB QuantiFERON and 2 sputum for AFB were negative. Patient denies any weight loss or night sweats. He reports good appetite. He denies any chest pain or shortness of breath  Review of Systems   All other systems reviewed and are negative. Objective   Physical Exam  Vitals and nursing note reviewed. Constitutional:       General: He is not in acute distress. Appearance: Normal appearance. HENT:      Head: Normocephalic and atraumatic. Nose: No congestion. Mouth/Throat:      Pharynx: No oropharyngeal exudate. Eyes:      General: No scleral icterus. Cardiovascular:      Rate and Rhythm: Normal rate and regular rhythm. Heart sounds: No murmur heard. Pulmonary:      Effort: Pulmonary effort is normal. No respiratory distress. Breath sounds: Normal breath sounds. No wheezing, rhonchi or rales. Abdominal:      General: Abdomen is flat. There is no distension.       Palpations: Abdomen is soft. There is no fluid wave, hepatomegaly, splenomegaly or mass. Tenderness: There is no abdominal tenderness. Musculoskeletal:         General: No swelling. Cervical back: No rigidity. Right lower leg: No edema. Left lower leg: No edema. Lymphadenopathy:      Cervical: No cervical adenopathy. Skin:     Coloration: Skin is not jaundiced. Findings: No erythema or rash. Neurological:      General: No focal deficit present. Mental Status: He is alert and oriented to person, place, and time. Cranial Nerves: No cranial nerve deficit. Motor: No weakness. Gait: Gait normal.   Psychiatric:         Mood and Affect: Mood normal.         Behavior: Behavior normal.         Thought Content: Thought content normal.         Judgment: Judgment normal.     CTA chest from February 1 was reviewed      Impression   The exam is nondiagnostic for evaluation for pulmonary embolism. Dense   consolidative infiltrate posteriorly within the right upper lobe. Findings   favor an infectious process however underlying mass at this time cannot be   completely excluded. Follow-up to resolution is recommended. Procalcitonin of 0.24  Strep pneumonia antigen and Legionella antigen were negative  HIV screen was negative  TB QuantiFERON was negative  Component 2/3/23 0626    AFB Stain SEE NOTE VC    Comment: Negative for Acid Fast Bacteria. Preliminary Result SEE NOTE       0 Result Notes  Component 2/2/23 0919    CULTURE, RESPIRATORY Direct Exam:  < 10 EPITHELIAL CELLS/LPF   Direct Exam:  >10, <25 NEUTROPHILS/LPF   Direct Exam:  MIXED BACTERIAL MORPHOTYPES SEEN ON GRAM STAIN.    Cult,Respiratory:  NORMAL RESPIRATORY FINESSE   Cult,Respiratory:  MODERATE GROWTH   Performed at [address]   ([phone]         On this date 2/15/2023 I have spent 30 minutes reviewing previous notes, test results and face to face with the patient discussing the diagnosis and importance of compliance with the treatment plan as well as documenting on the day of the visit. An electronic signature was used to authenticate this note.     --Mansoor Travis MD

## 2023-02-16 ENCOUNTER — OFFICE VISIT (OUTPATIENT)
Dept: FAMILY MEDICINE CLINIC | Age: 53
End: 2023-02-16

## 2023-02-16 VITALS
WEIGHT: 235 LBS | TEMPERATURE: 96.5 F | BODY MASS INDEX: 30.16 KG/M2 | OXYGEN SATURATION: 97 % | HEIGHT: 74 IN | RESPIRATION RATE: 18 BRPM | SYSTOLIC BLOOD PRESSURE: 118 MMHG | DIASTOLIC BLOOD PRESSURE: 72 MMHG | HEART RATE: 76 BPM

## 2023-02-16 DIAGNOSIS — Z09 HOSPITAL DISCHARGE FOLLOW-UP: Primary | ICD-10-CM

## 2023-02-16 DIAGNOSIS — J18.9 PNEUMONIA OF RIGHT LUNG DUE TO INFECTIOUS ORGANISM, UNSPECIFIED PART OF LUNG: ICD-10-CM

## 2023-02-16 DIAGNOSIS — R04.2 COUGH WITH HEMOPTYSIS: ICD-10-CM

## 2023-02-16 SDOH — ECONOMIC STABILITY: HOUSING INSECURITY
IN THE LAST 12 MONTHS, WAS THERE A TIME WHEN YOU DID NOT HAVE A STEADY PLACE TO SLEEP OR SLEPT IN A SHELTER (INCLUDING NOW)?: NO

## 2023-02-16 SDOH — ECONOMIC STABILITY: FOOD INSECURITY: WITHIN THE PAST 12 MONTHS, YOU WORRIED THAT YOUR FOOD WOULD RUN OUT BEFORE YOU GOT MONEY TO BUY MORE.: NEVER TRUE

## 2023-02-16 SDOH — ECONOMIC STABILITY: INCOME INSECURITY: HOW HARD IS IT FOR YOU TO PAY FOR THE VERY BASICS LIKE FOOD, HOUSING, MEDICAL CARE, AND HEATING?: NOT HARD AT ALL

## 2023-02-16 SDOH — ECONOMIC STABILITY: FOOD INSECURITY: WITHIN THE PAST 12 MONTHS, THE FOOD YOU BOUGHT JUST DIDN'T LAST AND YOU DIDN'T HAVE MONEY TO GET MORE.: NEVER TRUE

## 2023-02-16 ASSESSMENT — ENCOUNTER SYMPTOMS
SORE THROAT: 0
ABDOMINAL PAIN: 0
CHEST TIGHTNESS: 0
DIARRHEA: 0
VOMITING: 0
SINUS PRESSURE: 0
COUGH: 0
BACK PAIN: 0
NAUSEA: 0
SHORTNESS OF BREATH: 0
SINUS PAIN: 0

## 2023-02-16 ASSESSMENT — PATIENT HEALTH QUESTIONNAIRE - PHQ9
6. FEELING BAD ABOUT YOURSELF - OR THAT YOU ARE A FAILURE OR HAVE LET YOURSELF OR YOUR FAMILY DOWN: 0
SUM OF ALL RESPONSES TO PHQ QUESTIONS 1-9: 0
8. MOVING OR SPEAKING SO SLOWLY THAT OTHER PEOPLE COULD HAVE NOTICED. OR THE OPPOSITE, BEING SO FIGETY OR RESTLESS THAT YOU HAVE BEEN MOVING AROUND A LOT MORE THAN USUAL: 0
9. THOUGHTS THAT YOU WOULD BE BETTER OFF DEAD, OR OF HURTING YOURSELF: 0
SUM OF ALL RESPONSES TO PHQ QUESTIONS 1-9: 0
SUM OF ALL RESPONSES TO PHQ9 QUESTIONS 1 & 2: 0
SUM OF ALL RESPONSES TO PHQ QUESTIONS 1-9: 0
7. TROUBLE CONCENTRATING ON THINGS, SUCH AS READING THE NEWSPAPER OR WATCHING TELEVISION: 0
5. POOR APPETITE OR OVEREATING: 0
10. IF YOU CHECKED OFF ANY PROBLEMS, HOW DIFFICULT HAVE THESE PROBLEMS MADE IT FOR YOU TO DO YOUR WORK, TAKE CARE OF THINGS AT HOME, OR GET ALONG WITH OTHER PEOPLE: 0
2. FEELING DOWN, DEPRESSED OR HOPELESS: 0
1. LITTLE INTEREST OR PLEASURE IN DOING THINGS: 0
4. FEELING TIRED OR HAVING LITTLE ENERGY: 0
3. TROUBLE FALLING OR STAYING ASLEEP: 0
SUM OF ALL RESPONSES TO PHQ QUESTIONS 1-9: 0

## 2023-02-16 ASSESSMENT — VISUAL ACUITY: OU: 1

## 2023-02-16 NOTE — PROGRESS NOTES
Post-Discharge Transitional Care Follow Up      Akil Marshall   YOB: 1970    Date of Office Visit:  2/16/2023  Date of Hospital Admission: 1/31/23  Date of Hospital Discharge: 2/3/23  Readmission Risk Score (high >=14%. Medium >=10%):Readmission Risk Score: 8.9      Care management risk score Rising risk (score 2-5) and Complex Care (Scores >=6): No Risk Score On File     Non face to face  following discharge, date last encounter closed (first attempt may have been earlier): 02/06/2023     Call initiated 2 business days of discharge: Yes     Hospital discharge follow-up  -     [place] DISCHARGE MEDS RECONCILED W/ CURRENT OUTPATIENT MED LIST  Cough with hemoptysis  Resolving at this time. Patient reports having clear sputum.  -finished abx. - patient scheduled with Pulmonology and just had f/u with infectious disease. All labs are negative at this time. Will continue to f/u with guidance of pulmonology and infectious disease.   - Patient will continue normal f/u with Endo, PCP, and Urology as scheduled. Pneumonia of right lung due to infectious organism, unspecified part of lung  - see above    Medical Decision Making: moderate complexity  No follow-ups on file. Subjective:   HPI    Inpatient course: Discharge summary reviewed- see chart. \"Orville Johnson is a 48 y.o. male that was admitted and treated at [facility] for sepsis secondary to acute bacterial pneumonia. Patient is a 59-year-old male who was admitted with hemoptysis cough found to have a large right upper lobe lesion with possible necrosis/cavitary lesion patient was started initially on broad-spectrum coverage for sepsis secondary to pneumonia and necrotizing pneumonia this is de-escalated to commune acquired pneumonia coverage with ceftriaxone and azithromycin. Respiratory status leukocytosis and procalcitonin have resolved however patient will need to follow-up with pulmonary and ID in 1 week's time.   He will also need to follow-up for repeat imaging after 6 weeks of therapy to evaluate for improvement in lesion. Patient has TB testing that is currently pending and not resulted patient is to maintain quarantine at home until TB test result. TB gold and AFB are still pending HIV test is negative need to follow-up urine strep and Legionella antigens as well patient will be discharged on 10 days of Ceftin therapy by mouth. \"    Interval history/Current status: Progressing well. Has lung XR schduled tomorrow. Will meet with Dr. Ronni Foster on 02/21/23.  - was seen by Infectious disease yesterday. Negative TB. Will have AFB cultures. Will f/u as needed. - Patient reports he is doing better. Complaint with abx. Finished last dose today. States cough is subsiding. No longer having any blood in sputum. Patient still eating and drinking. No trouble swallowing. Patient having normal bowel movements. No fevers, chills, nausea, or vomiting. Patient Active Problem List   Diagnosis    Type 2 diabetes mellitus with complication, without long-term current use of insulin ([facility])    Obesity (BMI 30-39. 9)    Low HDL (under 40)    Erectile dysfunction associated with type 2 diabetes mellitus ([facility] [address] [facility]    Diverticulosis of large intestine    Albuminuria    Vitamin D deficiency    Gastroesophageal reflux disease without esophagitis    BPH without urinary obstruction    Varicose veins of both lower extremities with inflammation    Flexural atopic dermatitis    Yeast infection of the skin    Cough with hemoptysis    Pneumonia of right lung due to infectious organism       Medications listed as ordered at the time of discharge from hospital     Medication List            Accurate as of February 16, 2023 11:08 AM. If you have any questions, ask your nurse or doctor.                 CONTINUE taking these medications      empagliflozin 10 MG tablet  Commonly known as: Jardiance  Take 1 tablet by mouth daily     folic acid 1 MG tablet  Commonly known as: FOLVITE  TAKE 1 TABLET BY MOUTH EVERY DAY     FreeStyle Rick 14 Day Daisy Silvia Rolling  USE AS DIRECTED BEFORE MEALS AND NIGHTLY     FreeStyle Rick 14 Day Sensor Misc  USE AS DIRECTED EVERY 14 DAYS     guaiFENesin 600 MG extended release tablet  Commonly known as: MUCINEX  Take 1 tablet by mouth 2 times daily     lisinopril 5 MG tablet  Commonly known as: PRINIVIL;ZESTRIL  TAKE 1 TABLET BY MOUTH EVERY DAY     Multi-Vitamin Tabs     omeprazole 20 MG delayed release capsule  Commonly known as: PRILOSEC  TAKE 1 CAPSULE BY MOUTH EVERY DAY     Ozempic (1 MG/DOSE) 4 MG/3ML Sopn  Generic drug: Semaglutide (1 MG/DOSE)  Inject 1 mg into the skin once a week     pravastatin 10 MG tablet  Commonly known as: PRAVACHOL  TAKE 1 TABLET BY MOUTH IN THE EVENING     tadalafil 20 MG tablet  Commonly known as: CIALIS  Take 1 tablet by mouth daily as needed for Erectile Dysfunction               Medications marked \"taking\" at this time  Outpatient Medications Marked as Taking for the 2/16/23 encounter (Office Visit) with ESTEFANY Madrid   Medication Sig Dispense Refill    guaiFENesin (MUCINEX) 600 MG extended release tablet Take 1 tablet by mouth 2 times daily 60 tablet 0    pravastatin (PRAVACHOL) 10 MG tablet TAKE 1 TABLET BY MOUTH IN THE EVENING 90 tablet 1    Semaglutide, 1 MG/DOSE, (OZEMPIC, 1 MG/DOSE,) 4 MG/3ML SOPN Inject 1 mg into the skin once a week 9 mL 1    empagliflozin (JARDIANCE) 10 MG tablet Take 1 tablet by mouth daily 30 tablet 3    omeprazole (PRILOSEC) 20 MG delayed release capsule TAKE 1 CAPSULE BY MOUTH EVERY DAY 90 capsule 2    lisinopril (PRINIVIL;ZESTRIL) 5 MG tablet TAKE 1 TABLET BY MOUTH EVERY DAY 90 tablet 5    tadalafil (CIALIS) 20 MG tablet Take 1 tablet by mouth daily as needed for Erectile Dysfunction 90 tablet 3    folic acid (FOLVITE) 1 MG tablet TAKE 1 TABLET BY MOUTH EVERY DAY 90 tablet 4    Multiple Vitamin (MULTI-VITAMIN) TABS Take 1 tablet by mouth daily.             Medications patient taking as of now reconciled against medications ordered at time of hospital discharge: Yes    Review of Systems   Constitutional:  Negative for activity change, appetite change, chills and fever. HENT:  Negative for congestion, drooling, sinus pressure, sinus pain and sore throat. Eyes:  Negative for visual disturbance. Respiratory:  Negative for cough, chest tightness and shortness of breath. Cardiovascular:  Negative for chest pain. Gastrointestinal:  Negative for abdominal pain, diarrhea, nausea and vomiting. Endocrine: Negative for cold intolerance. Genitourinary:  Negative for dysuria, flank pain, frequency and hematuria. Musculoskeletal:  Negative for arthralgias and back pain. Skin:  Negative for rash. Allergic/Immunologic: Negative for food allergies. Neurological:  Negative for weakness, light-headedness, numbness and headaches. Hematological:  Does not bruise/bleed easily. Objective:    /72   Pulse 76   Temp (!) 96.5 °F (35.8 °C) (Temporal)   Resp 18   Ht 6' 2\" (1.88 m)   Wt 235 lb (106.6 kg)   SpO2 97%   BMI 30.17 kg/m²   Physical Exam  Vitals and nursing note reviewed. Constitutional:       General: He is awake. He is not in acute distress. Appearance: Normal appearance. He is well-developed. He is not ill-appearing, toxic-appearing or diaphoretic. HENT:      Head: Normocephalic and atraumatic. Right Ear: Hearing and external ear normal.      Left Ear: Hearing and external ear normal.      Nose: Nose normal.   Eyes:      General: Lids are normal. Vision grossly intact. Gaze aligned appropriately. Conjunctiva/sclera: Conjunctivae normal.      Pupils: Pupils are equal, round, and reactive to light. Cardiovascular:      Rate and Rhythm: Normal rate and regular rhythm. Pulses: Normal pulses.       Heart sounds: Normal heart sounds, S1 normal and S2 normal.   Pulmonary:      Effort: Pulmonary effort is normal.      Breath sounds: Normal breath sounds and air entry. Musculoskeletal:      Cervical back: Normal range of motion. Skin:     General: Skin is warm. Capillary Refill: Capillary refill takes less than 2 seconds. Neurological:      Mental Status: He is alert and oriented to person, place, and time. Gait: Gait is intact. Psychiatric:         Attention and Perception: Attention normal.         Mood and Affect: Mood normal.         Speech: Speech normal.         Behavior: Behavior normal. Behavior is cooperative. An electronic signature was used to authenticate this note.   --ESTEFANY Barrett

## 2023-02-20 ENCOUNTER — HOSPITAL ENCOUNTER (OUTPATIENT)
Dept: GENERAL RADIOLOGY | Age: 53
Discharge: HOME | End: 2023-02-22
Payer: COMMERCIAL

## 2023-02-20 DIAGNOSIS — R04.2 HEMOPTYSIS: ICD-10-CM

## 2023-02-20 PROCEDURE — 71046 X-RAY EXAM CHEST 2 VIEWS: CPT

## 2023-02-20 RX ORDER — EMPAGLIFLOZIN 10 MG/1
TABLET, FILM COATED ORAL
Qty: 30 TABLET | Refills: 3 | Status: SHIPPED | OUTPATIENT
Start: 2023-02-20

## 2023-02-21 ENCOUNTER — OFFICE VISIT (OUTPATIENT)
Dept: PULMONOLOGY | Age: 53
End: 2023-02-21
Payer: COMMERCIAL

## 2023-02-21 VITALS
BODY MASS INDEX: 29.79 KG/M2 | WEIGHT: 232 LBS | HEART RATE: 75 BPM | OXYGEN SATURATION: 95 % | SYSTOLIC BLOOD PRESSURE: 126 MMHG | DIASTOLIC BLOOD PRESSURE: 86 MMHG

## 2023-02-21 DIAGNOSIS — J18.9 PNEUMONIA OF RIGHT MIDDLE LOBE DUE TO INFECTIOUS ORGANISM: Primary | ICD-10-CM

## 2023-02-21 DIAGNOSIS — R04.2 COUGH WITH HEMOPTYSIS: ICD-10-CM

## 2023-02-21 PROCEDURE — G8427 DOCREV CUR MEDS BY ELIG CLIN: HCPCS | Performed by: INTERNAL MEDICINE

## 2023-02-21 PROCEDURE — G8484 FLU IMMUNIZE NO ADMIN: HCPCS | Performed by: INTERNAL MEDICINE

## 2023-02-21 PROCEDURE — 1036F TOBACCO NON-USER: CPT | Performed by: INTERNAL MEDICINE

## 2023-02-21 PROCEDURE — 99214 OFFICE O/P EST MOD 30 MIN: CPT | Performed by: INTERNAL MEDICINE

## 2023-02-21 PROCEDURE — G8417 CALC BMI ABV UP PARAM F/U: HCPCS | Performed by: INTERNAL MEDICINE

## 2023-02-21 PROCEDURE — 1111F DSCHRG MED/CURRENT MED MERGE: CPT | Performed by: INTERNAL MEDICINE

## 2023-02-21 PROCEDURE — 3017F COLORECTAL CA SCREEN DOC REV: CPT | Performed by: INTERNAL MEDICINE

## 2023-02-21 ASSESSMENT — ENCOUNTER SYMPTOMS
WHEEZING: 0
VOICE CHANGE: 0
COUGH: 1
VOMITING: 0
EYE ITCHING: 0
CHEST TIGHTNESS: 0
SHORTNESS OF BREATH: 0
SORE THROAT: 0
ABDOMINAL PAIN: 0
RHINORRHEA: 0
DIARRHEA: 0
NAUSEA: 0

## 2023-02-21 NOTE — PROGRESS NOTES
Subjective:     Alessio Hills is a 48 y.o. male who complains today of:     Chief Complaint   Patient presents with    Follow-Up from Hospital     3wk f/u for hemoptysis, pneumonia        HPI  He is  feeling  better. He said he had no hemoptysis since discharge  home . No C/o shortness of breath  with exertion. No Wheezing. C/o Cough with clear Sputum. No Hemoptysis. No Chest tightness. No Chest pain with radiation  or pleuritic pain. No  leg edema. No orthopnea. No Fever or chills. No Rhinorrhea and postnasal drip. Allergies:  Zosyn [piperacillin sod-tazobactam so]  Past Medical History:   Diagnosis Date    Anxiety     BPH without urinary obstruction     Bunion of great toe of right foot     Controlled diabetes mellitus type 2 with complications ([facility] 4531    Diabetic neuropathy associated with type 2 diabetes mellitus (HCC)     EMG Dr Katharina Flores    Diverticulitis     Erectile dysfunction     Dr Ingrid Kumar acid deficiency 8419    GERD (gastroesophageal reflux disease)     Low HDL (under 40)     Non-compliance with treatment 2015    Obesity (BMI 30-39. 9)      Past Surgical History:   Procedure Laterality Date    ABDOMEN SURGERY  2009    for diverticulitis    ABDOMINAL HERNIA REPAIR  2010    COLON SURGERY  2008    COLONOSCOPY      COLONOSCOPY N/A 9/1/2020    COLORECTAL CANCER SCREENING, HIGH RISK performed by Monie Pandey MD at [facility], [address], COLON, DIAGNOSTIC      THUMB AMPUTATION Right     due to accident     Family History   Problem Relation Age of Onset    Diabetes Father     Hypertension Mother     Colon Cancer Neg Hx      Social History     Socioeconomic History    Marital status:      Spouse name: Not on file    Number of children: 5    Years of education: Not on file    Highest education level: Not on file   Occupational History    Occupation: ,      Employer: [employer]   Tobacco Use    Smoking status: Never Smokeless tobacco: Never   Vaping Use    Vaping Use: Never used   Substance and Sexual Activity    Alcohol use: Yes     Comment: rare/social    Drug use: No    Sexual activity: Yes     Partners: Female   Other Topics Concern    Not on file   Social History Narrative    Born in [place], father from [place], one sister and one brother    , spouse works in a "[employer] office job, [employer] at [employer] [facility] [employer], 3 daughters and 2 sons    Lives in a house in [place], with spouse and 4 children    Hobbies: [employer], community service, music (keyboard)             Social Determinants of [address] Strain: Low Risk     Difficulty of Paying Living Expenses: Not hard at [address]: No Food Insecurity    Worried About [address] in the Last Year: Never true    [address] in the Last Year: Never true   Transportation Needs: No Transportation Needs    Lack of Transportation (Medical): No    Lack of Transportation (Non-Medical): No   Physical Activity: Not on file   Stress: Not on file   Social Connections: Not on file   Intimate Partner Violence: Not on file   Housing Stability: Unknown    Unable to Pay for Housing in the Last Year: Not on file    Number of Places Lived in the Last Year: Not on file    Unstable Housing in the Last Year: No         Review of Systems   Constitutional:  Negative for chills, diaphoresis, fatigue and fever. HENT:  Negative for congestion, mouth sores, nosebleeds, postnasal drip, rhinorrhea, sneezing, sore throat and voice change. Eyes:  Negative for itching and visual disturbance. Respiratory:  Positive for cough. Negative for chest tightness, shortness of breath and wheezing. Cardiovascular: Negative. Negative for chest pain, palpitations and leg swelling. Gastrointestinal:  Negative for abdominal pain, diarrhea, nausea and vomiting. Genitourinary:  Negative for difficulty urinating and hematuria. Musculoskeletal:  Negative for arthralgias, joint swelling and myalgias. Skin:  Negative for rash. Allergic/Immunologic: Negative for environmental allergies. Neurological:  Negative for dizziness, tremors, weakness and headaches. Psychiatric/Behavioral:  Negative for behavioral problems and sleep disturbance.        :     Vitals:    02/21/23 0917   BP: 126/86   Site: Left Upper Arm   Position: Sitting   Cuff Size: Medium Adult   Pulse: 75   SpO2: 95%   Weight: 232 lb (105.2 kg)     Wt Readings from Last 3 Encounters:   02/21/23 232 lb (105.2 kg)   02/16/23 235 lb (106.6 kg)   02/15/23 235 lb (106.6 kg)         Physical Exam  Constitutional:       Appearance: He is well-developed. HENT:      Head: Normocephalic and atraumatic. Nose: Nose normal.   Eyes:      Conjunctiva/sclera: Conjunctivae normal.      Pupils: Pupils are equal, round, and reactive to light. Neck:      Thyroid: No thyromegaly. Vascular: No JVD. Trachea: No tracheal deviation. Cardiovascular:      Rate and Rhythm: Normal rate and regular rhythm. Heart sounds: No murmur heard. No friction rub. No gallop. Pulmonary:      Effort: Pulmonary effort is normal. No respiratory distress. Breath sounds: Normal breath sounds. No wheezing or rales. Chest:      Chest wall: No tenderness. Abdominal:      General: There is no distension. Musculoskeletal:         General: Normal range of motion. Lymphadenopathy:      Cervical: No cervical adenopathy. Skin:     General: Skin is warm and dry. Findings: No rash. Neurological:      Mental Status: He is alert and oriented to person, place, and time. Cranial Nerves: No cranial nerve deficit.    Psychiatric:         Behavior: Behavior normal.       Current Outpatient Medications   Medication Sig Dispense Refill    JARDIANCE 10 MG tablet TAKE 1 TABLET BY MOUTH DAILY 30 tablet 3    guaiFENesin (MUCINEX) 600 MG extended release tablet Take 1 tablet by mouth 2 times daily 60 tablet 0    pravastatin (PRAVACHOL) 10 MG tablet TAKE 1 TABLET BY MOUTH IN THE EVENING 90 tablet 1    Semaglutide, 1 MG/DOSE, (OZEMPIC, 1 MG/DOSE,) 4 MG/3ML SOPN Inject 1 mg into the skin once a week 9 mL 1    Continuous Blood Gluc Sensor (FREESTYLE AMANDA 14 DAY SENSOR) MISC USE AS DIRECTED EVERY 14 DAYS 6 each 5    omeprazole (PRILOSEC) 20 MG delayed release capsule TAKE 1 CAPSULE BY MOUTH EVERY DAY 90 capsule 2    lisinopril (PRINIVIL;ZESTRIL) 5 MG tablet TAKE 1 TABLET BY MOUTH EVERY DAY 90 tablet 5    tadalafil (CIALIS) 20 MG tablet Take 1 tablet by mouth daily as needed for Erectile Dysfunction 90 tablet 3    folic acid (FOLVITE) 1 MG tablet TAKE 1 TABLET BY MOUTH EVERY DAY 90 tablet 4    Continuous Blood Gluc  (FREESTYLE AMANDA 14 DAY READER) ABIGAIL USE AS DIRECTED BEFORE MEALS AND NIGHTLY 1 Device 0    Multiple Vitamin (MULTI-VITAMIN) TABS Take 1 tablet by mouth daily. No current facility-administered medications for this visit. Results for orders placed during the hospital encounter of 02/20/23    XR CHEST (2 VW)    Narrative  EXAMINATION:  TWO XRAY VIEWS OF THE CHEST    2/20/2023 8:01 am    COMPARISON:  None. HISTORY:  ORDERING SYSTEM PROVIDED HISTORY: Hemoptysis  TECHNOLOGIST PROVIDED HISTORY:  What reading provider will be dictating this exam?->CRC    FINDINGS:  Waning right-sided pneumonia or pulmonary infarct. Normal heart pulmonary  vascularity. Neither costophrenic angle is blunted. Impression  Waning right-sided pneumonia versus pulmonary infarct. Results for orders placed during the hospital encounter of 01/31/23    XR CHEST (2 VW)    Narrative  EXAMINATION:  TWO XRAY VIEWS OF THE CHEST    2/3/2023 9:29 am    COMPARISON:  Portable chest radiograph January 31, 2023 and CT chest February 1, 2023.     HISTORY:  ORDERING SYSTEM PROVIDED HISTORY: pneumonia  TECHNOLOGIST PROVIDED HISTORY:  Reason for exam:->pneumonia  What reading provider will be dictating this exam?->CRC    FINDINGS:  The cardiomediastinal silhouette is within normal limits. Masslike opacity  of the right upper lobe is again identified and does not appear significantly  changed in size when compared to January 31 2023 radiograph. There appears  to now be a cavitary component of this lesion that measures approximately 1.7  cm. Left lung appears clear. No pneumothorax or pleural effusion. No acute  osseous abnormality. Impression  Masslike opacity of the right upper lobe does not appear significantly  changed when compared to prior radiograph however there now appears to be a  cavitary component. Differential diagnosis includes infectious/inflammatory  etiologies as well as malignancy.  ]  Results for orders placed during the hospital encounter of 01/31/23    XR CHEST PORTABLE    Narrative  EXAMINATION:  ONE XRAY VIEW OF THE CHEST    1/31/2023 11:49 pm    COMPARISON:  None. HISTORY:  ORDERING SYSTEM PROVIDED HISTORY: Sepsis  TECHNOLOGIST PROVIDED HISTORY:  Reason for exam:->Sepsis  What reading provider will be dictating this exam?->CRC    FINDINGS:  Normal cardiomediastinal silhouette. Right mid lung airspace disease  suggesting localized pneumonitis. No pneumothorax or effusion. Osseous  thorax intact. Impression  Right mid lung airspace disease suggesting localized pneumonitis. No  effusions. RECOMMENDATION:  Careful clinical correlation and follow up recommended. Assessment/Plan:     1. Pneumonia of right middle lobe due to infectious organism  He is  feeling  better. He said he had no hemoptysis since discharge  home . No C/o shortness of breath  with exertion. No Wheezing. C/o Cough with clear Sputum. Will request follow-up CT chest to see pneumonia cleared up or not. - CT CHEST WO CONTRAST; Future    2. Cough with hemoptysis  He said he is still having cough but he is only brings out clear mucus no hemoptysis. Return in about 4 weeks (around 3/21/2023).       Vandana Reach Deysi Coffey MD

## 2023-02-28 ENCOUNTER — HOSPITAL ENCOUNTER (OUTPATIENT)
Dept: CT IMAGING | Age: 53
Discharge: HOME | End: 2023-03-02
Payer: COMMERCIAL

## 2023-02-28 DIAGNOSIS — J18.9 PNEUMONIA OF RIGHT MIDDLE LOBE DUE TO INFECTIOUS ORGANISM: ICD-10-CM

## 2023-02-28 PROCEDURE — 71250 CT THORAX DX C-: CPT

## 2023-03-07 RX ORDER — SEMAGLUTIDE 1.34 MG/ML
1 INJECTION, SOLUTION SUBCUTANEOUS WEEKLY
Qty: 9 ML | Refills: 2 | Status: SHIPPED | OUTPATIENT
Start: 2023-03-07

## 2023-03-13 DIAGNOSIS — E11.8 TYPE 2 DIABETES MELLITUS WITH COMPLICATION, WITHOUT LONG-TERM CURRENT USE OF INSULIN (HCC): ICD-10-CM

## 2023-03-13 RX ORDER — OMEPRAZOLE 20 MG/1
CAPSULE, DELAYED RELEASE ORAL
Qty: 90 CAPSULE | Refills: 1 | Status: SHIPPED | OUTPATIENT
Start: 2023-03-13

## 2023-03-21 ENCOUNTER — OFFICE VISIT (OUTPATIENT)
Dept: PULMONOLOGY | Age: 53
End: 2023-03-21
Payer: COMMERCIAL

## 2023-03-21 VITALS
WEIGHT: 235 LBS | OXYGEN SATURATION: 95 % | SYSTOLIC BLOOD PRESSURE: 130 MMHG | BODY MASS INDEX: 30.17 KG/M2 | DIASTOLIC BLOOD PRESSURE: 72 MMHG | TEMPERATURE: 97.3 F | HEART RATE: 71 BPM

## 2023-03-21 DIAGNOSIS — R04.2 COUGH WITH HEMOPTYSIS: ICD-10-CM

## 2023-03-21 DIAGNOSIS — J18.9 PNEUMONIA OF RIGHT MIDDLE LOBE DUE TO INFECTIOUS ORGANISM: Primary | ICD-10-CM

## 2023-03-21 PROCEDURE — G8484 FLU IMMUNIZE NO ADMIN: HCPCS | Performed by: INTERNAL MEDICINE

## 2023-03-21 PROCEDURE — 3017F COLORECTAL CA SCREEN DOC REV: CPT | Performed by: INTERNAL MEDICINE

## 2023-03-21 PROCEDURE — 1036F TOBACCO NON-USER: CPT | Performed by: INTERNAL MEDICINE

## 2023-03-21 PROCEDURE — G8427 DOCREV CUR MEDS BY ELIG CLIN: HCPCS | Performed by: INTERNAL MEDICINE

## 2023-03-21 PROCEDURE — 99214 OFFICE O/P EST MOD 30 MIN: CPT | Performed by: INTERNAL MEDICINE

## 2023-03-21 PROCEDURE — G8417 CALC BMI ABV UP PARAM F/U: HCPCS | Performed by: INTERNAL MEDICINE

## 2023-03-21 RX ORDER — DOXYCYCLINE HYCLATE 100 MG
100 TABLET ORAL 2 TIMES DAILY
Qty: 20 TABLET | Refills: 0 | Status: SHIPPED | OUTPATIENT
Start: 2023-03-21 | End: 2023-03-31

## 2023-03-21 ASSESSMENT — ENCOUNTER SYMPTOMS
SHORTNESS OF BREATH: 0
WHEEZING: 0
VOICE CHANGE: 0
COUGH: 1
SORE THROAT: 0
ABDOMINAL PAIN: 0
RHINORRHEA: 0
DIARRHEA: 0
CHEST TIGHTNESS: 0
NAUSEA: 0
EYE ITCHING: 0
VOMITING: 0

## 2023-03-21 NOTE — PROGRESS NOTES
on 2/28/23. Which shows considerable reduction in size of opacification density in the right upper lobe now measuring up to maximum 4 cm previously this was about 6 cmadjacent pulmonary opacifications with subtle areas of lucency developed centrally likely components of necrosis or minimal cavitation versus associated bronchiectasis with continued attention on follow-up to ensure resolution. He said he cough out clear mucus. No bleeding at this time . bloody mucus small amount on 2/28/23 nothing after that. He feels sensation of pulling rt posterior chest area with cough. He has no fever or chills. No C/o shortness of breath  with exertion. I reviewed CT chest with the patient and explained most likely infectious etiology but other etiology like malignancy also cannot be ruled out and recommend bronchoscopy with biopsy. He is not sure he wants to have a bronchoscopy done at this time he said he is getting better and wants to try 1 more course of antibiotic and have a follow-up CT chest done. If no improvement. - doxycycline hyclate (VIBRA-TABS) 100 MG tablet; Take 1 tablet by mouth 2 times daily for 10 days  Dispense: 20 tablet; Refill: 0    - CT CHEST WO CONTRAST; Future    2. Cough with hemoptysis  He said he is not coughing any bloody mucus at this time but he did have it prior to previous CT chest done. I will give 1 more course of antibiotic and have a repeat CT chest done if no improvement I recommend patient to have a bronchoscopy done for further evaluation. Return in about 2 weeks (around 4/4/2023).       Genia Rodríguez MD

## 2023-03-24 ENCOUNTER — TELEPHONE (OUTPATIENT)
Dept: FAMILY MEDICINE CLINIC | Age: 53
End: 2023-03-24

## 2023-04-01 LAB
AFB STAIN: NORMAL
AFB STAIN: NORMAL
FINAL REPORT: NORMAL
FINAL REPORT: NORMAL
PRELIMINARY: NORMAL
PRELIMINARY: NORMAL

## 2023-04-04 ENCOUNTER — HOSPITAL ENCOUNTER (OUTPATIENT)
Dept: CT IMAGING | Age: 53
Discharge: HOME | End: 2023-04-06
Payer: COMMERCIAL

## 2023-04-04 DIAGNOSIS — J18.9 PNEUMONIA OF RIGHT MIDDLE LOBE DUE TO INFECTIOUS ORGANISM: ICD-10-CM

## 2023-04-04 PROCEDURE — 71250 CT THORAX DX C-: CPT

## 2023-04-19 ENCOUNTER — OFFICE VISIT (OUTPATIENT)
Dept: ENDOCRINOLOGY | Age: 53
End: 2023-04-19

## 2023-04-19 VITALS
HEIGHT: 74 IN | DIASTOLIC BLOOD PRESSURE: 78 MMHG | OXYGEN SATURATION: 96 % | HEART RATE: 95 BPM | WEIGHT: 234 LBS | SYSTOLIC BLOOD PRESSURE: 117 MMHG | BODY MASS INDEX: 30.03 KG/M2

## 2023-04-19 DIAGNOSIS — E11.8 TYPE 2 DIABETES MELLITUS WITH COMPLICATION, WITHOUT LONG-TERM CURRENT USE OF INSULIN (HCC): Primary | ICD-10-CM

## 2023-04-19 DIAGNOSIS — E03.8 TSH (THYROID-STIMULATING HORMONE DEFICIENCY): ICD-10-CM

## 2023-04-19 LAB
CHP ED QC CHECK: ABNORMAL
GLUCOSE BLD-MCNC: 164 MG/DL
HBA1C MFR BLD: 6.3 %

## 2023-04-19 RX ORDER — SEMAGLUTIDE 2.68 MG/ML
2 INJECTION, SOLUTION SUBCUTANEOUS WEEKLY
Qty: 3 ML | Refills: 5 | Status: SHIPPED | OUTPATIENT
Start: 2023-04-19

## 2023-04-19 RX ORDER — LISINOPRIL 5 MG/1
5 TABLET ORAL DAILY
Qty: 90 TABLET | Refills: 5 | Status: SHIPPED | OUTPATIENT
Start: 2023-04-19

## 2023-04-19 ASSESSMENT — ENCOUNTER SYMPTOMS
BLURRED VISION: 0
DIARRHEA: 0
WHEEZING: 0
ABDOMINAL PAIN: 0
NAUSEA: 0
SORE THROAT: 0
EYE REDNESS: 0
EYE PAIN: 0
VOMITING: 0
COUGH: 0
RHINORRHEA: 0
SHORTNESS OF BREATH: 0
SINUS PRESSURE: 0

## 2023-04-19 NOTE — PROGRESS NOTES
reflux disease)     Low HDL (under 40)     Non-compliance with treatment 2015    Obesity (BMI 30-39. 9)      Past Surgical History:   Procedure Laterality Date    ABDOMEN SURGERY  2009    for diverticulitis    ABDOMINAL HERNIA REPAIR  2010    COLON SURGERY  2008    COLONOSCOPY      COLONOSCOPY N/A 9/1/2020    COLORECTAL CANCER SCREENING, HIGH RISK performed by Larkin Sandifer, MD at [address], DIAGNOSTIC      THUMB AMPUTATION Right     due to accident     Social History     Socioeconomic History    Marital status:      Spouse name: Not on file    Number of children: 5    Years of education: Not on file    Highest education level: Not on file   Occupational History    Occupation: ,      Employer: [email or web address]   Tobacco Use    Smoking status: Never    Smokeless tobacco: Never   Vaping Use    Vaping Use: Never used   Substance and Sexual Activity    Alcohol use: Yes     Comment: rare/social    Drug use: No    Sexual activity: Yes     Partners: Female   Other Topics Concern    Not on file   Social History Narrative    Born in [facility], father from [place], one sister and one brother    , spouse works in a [employer], office job, [employer] at [place] [address], 3 daughters and 2 sons    Lives in a house in [facility], with spouse and 4 children    Hobbies: Orthodox, community service, music (keyboard)             Social Determinants of [address] Strain: Low Risk     Difficulty of Paying Living Expenses: Not hard at [address]: No Food Insecurity    Worried About [address] [facility] in the Last Year: Never true    [address] in the Last Year: Never true   Transportation Needs: No Transportation Needs    Lack of Transportation (Medical): No    Lack of Transportation (Non-Medical):  No   Physical Activity: Not on file   Stress: Not on file   Social Connections: Not on file   Intimate

## 2023-04-27 ENCOUNTER — TELEPHONE (OUTPATIENT)
Dept: UROLOGY | Age: 53
End: 2023-04-27

## 2023-04-27 DIAGNOSIS — N40.0 BENIGN PROSTATIC HYPERPLASIA WITHOUT LOWER URINARY TRACT SYMPTOMS: Primary | ICD-10-CM

## 2023-04-28 DIAGNOSIS — N40.0 BENIGN PROSTATIC HYPERPLASIA WITHOUT LOWER URINARY TRACT SYMPTOMS: ICD-10-CM

## 2023-04-28 LAB — PSA SERPL-MCNC: 0.74 NG/ML (ref 0–4)

## 2023-05-01 ENCOUNTER — OFFICE VISIT (OUTPATIENT)
Dept: UROLOGY | Age: 53
End: 2023-05-01
Payer: COMMERCIAL

## 2023-05-01 VITALS
HEART RATE: 67 BPM | DIASTOLIC BLOOD PRESSURE: 78 MMHG | BODY MASS INDEX: 29.13 KG/M2 | WEIGHT: 227 LBS | HEIGHT: 74 IN | SYSTOLIC BLOOD PRESSURE: 112 MMHG

## 2023-05-01 DIAGNOSIS — N52.9 ERECTILE DYSFUNCTION, UNSPECIFIED ERECTILE DYSFUNCTION TYPE: ICD-10-CM

## 2023-05-01 DIAGNOSIS — Z12.5 SCREENING PSA (PROSTATE SPECIFIC ANTIGEN): Primary | ICD-10-CM

## 2023-05-01 PROCEDURE — G8427 DOCREV CUR MEDS BY ELIG CLIN: HCPCS | Performed by: PHYSICIAN ASSISTANT

## 2023-05-01 PROCEDURE — 1036F TOBACCO NON-USER: CPT | Performed by: PHYSICIAN ASSISTANT

## 2023-05-01 PROCEDURE — 99203 OFFICE O/P NEW LOW 30 MIN: CPT | Performed by: PHYSICIAN ASSISTANT

## 2023-05-01 PROCEDURE — G8417 CALC BMI ABV UP PARAM F/U: HCPCS | Performed by: PHYSICIAN ASSISTANT

## 2023-05-01 PROCEDURE — 3017F COLORECTAL CA SCREEN DOC REV: CPT | Performed by: PHYSICIAN ASSISTANT

## 2023-05-01 RX ORDER — TADALAFIL 20 MG/1
20 TABLET ORAL DAILY PRN
Qty: 90 TABLET | Refills: 3 | Status: SHIPPED | OUTPATIENT
Start: 2023-05-01 | End: 2024-04-30

## 2023-05-01 ASSESSMENT — ENCOUNTER SYMPTOMS
TROUBLE SWALLOWING: 0
DIARRHEA: 0
VOMITING: 0
CHEST TIGHTNESS: 0
SINUS PRESSURE: 0
ABDOMINAL PAIN: 0
SHORTNESS OF BREATH: 0
BACK PAIN: 0
COUGH: 0

## 2023-05-01 NOTE — PROGRESS NOTES
Subjective:      Patient ID: Chris Herman is a 48 y.o. male    HPI  48year old male who presents for an annual follow up for the screening of his psa. The psa on 4/28/23 was 0.74, previously on 4/21/23 it was 0.88. he voices no urological complaints, and states that he only gets up at night to urinate occasionally depending on what he had to drink prior to going to bed. He is also coming for a refill for his cialis which works well for him    Past Medical History:   Diagnosis Date    Anxiety     BPH without urinary obstruction     Bunion of great toe of right foot     Controlled diabetes mellitus type 2 with complications ([facility] 3662    Diabetic neuropathy associated with type 2 diabetes mellitus ([facility] [address]     EMG Dr Nataly Li    Diverticulitis     Erectile dysfunction     Dr Gwen Barillas acid deficiency 1694    GERD (gastroesophageal reflux disease)     Low HDL (under 40)     Non-compliance with treatment 2015    Obesity (BMI 30-39. 9)      Past Surgical History:   Procedure Laterality Date    ABDOMEN SURGERY  2009    for diverticulitis    ABDOMINAL HERNIA REPAIR  2010    COLON SURGERY  2008    COLONOSCOPY      COLONOSCOPY N/A 9/1/2020    COLORECTAL CANCER SCREENING, HIGH RISK performed by Brent Mccain MD at [facility], COLON, DIAGNOSTIC      THUMB AMPUTATION Right     due to accident     Social History     Socioeconomic History    Marital status:      Spouse name: None    Number of children: 5    Years of education: None    Highest education level: None   Occupational History    Occupation: ,      Employer: [employer]   Tobacco Use    Smoking status: Never    Smokeless tobacco: Never   Vaping Use    Vaping Use: Never used   Substance and Sexual Activity    Alcohol use: Yes     Comment: rare/social    Drug use: No    Sexual activity: Yes     Partners: Female   Social History Narrative    Born in [facility], father from [place], one sister and one brother

## 2023-05-08 DIAGNOSIS — E11.8 TYPE 2 DIABETES MELLITUS WITH COMPLICATION, WITHOUT LONG-TERM CURRENT USE OF INSULIN (HCC): ICD-10-CM

## 2023-05-08 RX ORDER — LISINOPRIL 5 MG/1
5 TABLET ORAL DAILY
Qty: 90 TABLET | Refills: 5 | Status: SHIPPED | OUTPATIENT
Start: 2023-05-08

## 2023-05-12 ENCOUNTER — OFFICE VISIT (OUTPATIENT)
Dept: PULMONOLOGY | Age: 53
End: 2023-05-12
Payer: COMMERCIAL

## 2023-05-12 VITALS
OXYGEN SATURATION: 96 % | BODY MASS INDEX: 30.04 KG/M2 | SYSTOLIC BLOOD PRESSURE: 130 MMHG | TEMPERATURE: 97.5 F | HEART RATE: 54 BPM | WEIGHT: 234 LBS | DIASTOLIC BLOOD PRESSURE: 80 MMHG

## 2023-05-12 DIAGNOSIS — R04.2 COUGH WITH HEMOPTYSIS: ICD-10-CM

## 2023-05-12 DIAGNOSIS — J18.9 PNEUMONIA OF RIGHT MIDDLE LOBE DUE TO INFECTIOUS ORGANISM: Primary | ICD-10-CM

## 2023-05-12 PROCEDURE — 3017F COLORECTAL CA SCREEN DOC REV: CPT | Performed by: INTERNAL MEDICINE

## 2023-05-12 PROCEDURE — G8417 CALC BMI ABV UP PARAM F/U: HCPCS | Performed by: INTERNAL MEDICINE

## 2023-05-12 PROCEDURE — G8427 DOCREV CUR MEDS BY ELIG CLIN: HCPCS | Performed by: INTERNAL MEDICINE

## 2023-05-12 PROCEDURE — 1036F TOBACCO NON-USER: CPT | Performed by: INTERNAL MEDICINE

## 2023-05-12 PROCEDURE — 99214 OFFICE O/P EST MOD 30 MIN: CPT | Performed by: INTERNAL MEDICINE

## 2023-05-12 ASSESSMENT — ENCOUNTER SYMPTOMS
NAUSEA: 0
VOMITING: 0
CHEST TIGHTNESS: 0
VOICE CHANGE: 0
SHORTNESS OF BREATH: 0
EYE ITCHING: 0
WHEEZING: 0
ABDOMINAL PAIN: 0
DIARRHEA: 0
COUGH: 0
SORE THROAT: 0
RHINORRHEA: 0

## 2023-06-26 RX ORDER — EMPAGLIFLOZIN 10 MG/1
TABLET, FILM COATED ORAL
Qty: 30 TABLET | Refills: 3 | Status: SHIPPED | OUTPATIENT
Start: 2023-06-26

## 2023-08-08 RX ORDER — PRAVASTATIN SODIUM 10 MG
TABLET ORAL
Qty: 90 TABLET | Refills: 1 | Status: SHIPPED | OUTPATIENT
Start: 2023-08-08

## 2023-09-01 RX ORDER — PRAVASTATIN SODIUM 10 MG
TABLET ORAL
Qty: 90 TABLET | Refills: 1 | Status: SHIPPED | OUTPATIENT
Start: 2023-09-01

## 2023-09-19 ENCOUNTER — TELEPHONE (OUTPATIENT)
Dept: ENDOCRINOLOGY | Age: 53
End: 2023-09-19

## 2023-10-03 RX ORDER — SEMAGLUTIDE 2.68 MG/ML
2 INJECTION, SOLUTION SUBCUTANEOUS WEEKLY
Qty: 1 ML | Refills: 0 | COMMUNITY
Start: 2023-10-03

## 2023-10-03 NOTE — TELEPHONE ENCOUNTER
Patient requesting medication refill.  Please approve or deny this request.    Rx requested:  Requested Prescriptions     Pending Prescriptions Disp Refills    Semaglutide, 2 MG/DOSE, (OZEMPIC, 2 MG/DOSE,) 8 MG/3ML SOPN 1 mL 0     Sig: Inject 2 mg into the skin once a week Lot: [id number], Exp: 8-31-24, Disp: 1         Last Office Visit:   4/19/2023      Next Visit Date:  Future Appointments   Date Time Provider [address] [place] [address]   10/25/2023  8:30 AM Celena Espitia, [address] [address] [facility]   5/2/2024 10:00 AM ESTEFANY Morse [address] [place] [address]

## 2023-10-23 RX ORDER — EMPAGLIFLOZIN 10 MG/1
TABLET, FILM COATED ORAL
Qty: 30 TABLET | Refills: 3 | Status: SHIPPED | OUTPATIENT
Start: 2023-10-23

## 2023-10-24 DIAGNOSIS — E11.8 TYPE 2 DIABETES MELLITUS WITH COMPLICATION, WITHOUT LONG-TERM CURRENT USE OF INSULIN (HCC): ICD-10-CM

## 2023-10-25 ENCOUNTER — OFFICE VISIT (OUTPATIENT)
Dept: ENDOCRINOLOGY | Age: 53
End: 2023-10-25

## 2023-10-25 VITALS
SYSTOLIC BLOOD PRESSURE: 123 MMHG | WEIGHT: 237 LBS | OXYGEN SATURATION: 95 % | HEIGHT: 74 IN | DIASTOLIC BLOOD PRESSURE: 80 MMHG | BODY MASS INDEX: 30.42 KG/M2 | HEART RATE: 69 BPM

## 2023-10-25 DIAGNOSIS — E11.8 TYPE 2 DIABETES MELLITUS WITH COMPLICATION, WITHOUT LONG-TERM CURRENT USE OF INSULIN (HCC): Primary | ICD-10-CM

## 2023-10-25 DIAGNOSIS — E03.8 TSH (THYROID-STIMULATING HORMONE DEFICIENCY): ICD-10-CM

## 2023-10-25 LAB
CHP ED QC CHECK: ABNORMAL
GLUCOSE BLD-MCNC: 158 MG/DL
HBA1C MFR BLD: 6.2 %

## 2023-10-25 RX ORDER — SEMAGLUTIDE 1.34 MG/ML
1 INJECTION, SOLUTION SUBCUTANEOUS WEEKLY
COMMUNITY
Start: 2023-10-03 | End: 2023-10-25 | Stop reason: ALTCHOICE

## 2023-10-25 RX ORDER — OMEPRAZOLE 20 MG/1
CAPSULE, DELAYED RELEASE ORAL
Qty: 90 CAPSULE | Refills: 3 | Status: SHIPPED | OUTPATIENT
Start: 2023-10-25

## 2023-10-25 RX ORDER — OMEPRAZOLE 20 MG/1
CAPSULE, DELAYED RELEASE ORAL
Qty: 90 CAPSULE | Refills: 0 | OUTPATIENT
Start: 2023-10-25

## 2023-10-25 RX ORDER — SEMAGLUTIDE 1.34 MG/ML
1 INJECTION, SOLUTION SUBCUTANEOUS WEEKLY
Status: CANCELLED | OUTPATIENT
Start: 2023-10-25

## 2023-10-25 RX ORDER — LISINOPRIL 5 MG/1
5 TABLET ORAL DAILY
Qty: 90 TABLET | Refills: 5 | Status: SHIPPED | OUTPATIENT
Start: 2023-10-25

## 2023-10-25 RX ORDER — SEMAGLUTIDE 2.68 MG/ML
2 INJECTION, SOLUTION SUBCUTANEOUS WEEKLY
Qty: 3 ML | Refills: 5 | Status: SHIPPED | OUTPATIENT
Start: 2023-10-25

## 2023-10-25 ASSESSMENT — ENCOUNTER SYMPTOMS
EYE PAIN: 0
DIARRHEA: 0
BLURRED VISION: 0
SORE THROAT: 0
ABDOMINAL PAIN: 0
SHORTNESS OF BREATH: 0
WHEEZING: 0
COUGH: 0
NAUSEA: 0
VOMITING: 0
EYE REDNESS: 0
SINUS PRESSURE: 0
RHINORRHEA: 0

## 2023-10-25 NOTE — PATIENT INSTRUCTIONS
Endocrinology-    Check your blood sugars 4 times a day, before meals and at night  Document these numbers in a blood glucose log and bring them with you to your follow-up appointment. If you are prescribed insulin, Do not take your mealtime insulin if your blood sugars less than 120   Call our office if you have blood sugars less than 80 or greater then 300 on two or more occasions  Call our office if you have any questions regarding your blood sugars or insulin dosing regiment  Signs of low blood sugar may include tremors, feeling shaky, sweating, dizziness, confusion and weakness. Check your blood sugar immediatly if you have any of these symptoms.      The plan as discussed at your appointment-   Jardiance 10 mg daily   Omeprazole 20 mg daily   Ozempic 2.0 weekly  Continue pravastatin 10 mg nightly   Repeat labs in 6 months  Follow up in 6 months

## 2023-10-25 NOTE — PROGRESS NOTES
Assessment:       Diagnosis Orders   1. Type 2 diabetes mellitus with complication, without long-term current use of insulin (HCC)  POCT Glucose    POCT glycosylated hemoglobin (Hb A1C)    Comprehensive Metabolic Panel    Hemoglobin A1C    omeprazole (PRILOSEC) 20 MG delayed release capsule    lisinopril (PRINIVIL;ZESTRIL) 5 MG tablet    Lipid Panel      2. TSH (thyroid-stimulating hormone deficiency)          No history of Pancreatitis  Pt is uncircumcised, want to consider SGLT2    PLAN:     Jardiance 10 mg daily   Omeprazole 20 mg daily   Ozempic 2.0 weekly  Continue pravastatin 10 mg nightly   Repeat labs in 6 months  Follow up in 6 months     Orders Placed This Encounter   Procedures    Comprehensive Metabolic Panel     Standing Status:   Future     Standing Expiration Date:   10/25/2024    Hemoglobin A1C     Standing Status:   Future     Standing Expiration Date:   10/25/2024    Lipid Panel     Standing Status:   Future     Standing Expiration Date:   10/25/2024     Order Specific Question:   Is Patient Fasting?/# of Hours     Answer:   8    POCT Glucose    POCT glycosylated hemoglobin (Hb A1C)     Orders Placed This Encounter   Medications    omeprazole (PRILOSEC) 20 MG delayed release capsule     Sig: TAKE 1 CAPSULE BY MOUTH EVERY DAY     Dispense:  90 capsule     Refill:  3    lisinopril (PRINIVIL;ZESTRIL) 5 MG tablet     Sig: Take 1 tablet by mouth daily     Dispense:  90 tablet     Refill:  5    Semaglutide, 2 MG/DOSE, (OZEMPIC, 2 MG/DOSE,) 8 MG/3ML SOPN     Sig: Inject 2 mg into the skin once a week     Dispense:  3 mL     Refill:  5     Return in about 6 months (around 4/25/2024) for Diabetes. Subjective:     No chief complaint on file.        Vitals:    10/25/23 0839   BP: 123/80   Site: Left Upper Arm   Pulse: 69   SpO2: 95%   Weight: 107.5 kg (237 lb)   Height: 1.88 m (6' 2.02\")     Wt Readings from Last 3 Encounters:   10/25/23 107.5 kg (237 lb)   05/12/23 106.1 kg (234 lb)   05/01/23 103 kg (227
No

## 2023-12-12 RX ORDER — SEMAGLUTIDE 2.68 MG/ML
2 INJECTION, SOLUTION SUBCUTANEOUS WEEKLY
Qty: 3 ML | Refills: 5 | Status: SHIPPED | OUTPATIENT
Start: 2023-12-12

## 2023-12-12 NOTE — TELEPHONE ENCOUNTER
Patient requesting medication refill.  Please approve or deny this request.    Rx requested:  Requested Prescriptions     Pending Prescriptions Disp Refills    Semaglutide, 2 MG/DOSE, (OZEMPIC, 2 MG/DOSE,) 8 MG/3ML SOPN 3 mL 5     Sig: Inject 2 mg into the skin once a week         Last Office Visit:   10/25/2023      Next Visit Date:  Future Appointments   Date Time Provider [address] [place] [address]   4/24/2024  8:15 AM Jyoti Velásquez   5/2/2024 10:00 AM Joy Balnc PA [address] [place] [address]

## 2024-02-20 RX ORDER — EMPAGLIFLOZIN 10 MG/1
10 TABLET, FILM COATED ORAL DAILY
Qty: 30 TABLET | Refills: 3 | Status: SHIPPED | OUTPATIENT
Start: 2024-02-20

## 2024-03-08 RX ORDER — PRAVASTATIN SODIUM 10 MG
TABLET ORAL
Qty: 90 TABLET | Refills: 1 | Status: SHIPPED | OUTPATIENT
Start: 2024-03-08

## 2024-04-19 RX ORDER — FOLIC ACID 1 MG/1
TABLET ORAL
Qty: 90 TABLET | Refills: 1 | Status: SHIPPED | OUTPATIENT
Start: 2024-04-19

## 2024-05-21 RX ORDER — SEMAGLUTIDE 2.68 MG/ML
INJECTION, SOLUTION SUBCUTANEOUS
Qty: 3 ML | Refills: 5 | Status: SHIPPED | OUTPATIENT
Start: 2024-05-21

## 2024-05-22 ENCOUNTER — OFFICE VISIT (OUTPATIENT)
Dept: ENDOCRINOLOGY | Age: 54
End: 2024-05-22
Payer: COMMERCIAL

## 2024-05-22 ENCOUNTER — TELEPHONE (OUTPATIENT)
Dept: ENDOCRINOLOGY | Age: 54
End: 2024-05-22

## 2024-05-22 VITALS
HEIGHT: 74 IN | HEART RATE: 85 BPM | SYSTOLIC BLOOD PRESSURE: 127 MMHG | DIASTOLIC BLOOD PRESSURE: 84 MMHG | BODY MASS INDEX: 30.29 KG/M2 | WEIGHT: 236 LBS

## 2024-05-22 DIAGNOSIS — E11.8 TYPE 2 DIABETES MELLITUS WITH COMPLICATION, WITHOUT LONG-TERM CURRENT USE OF INSULIN (HCC): ICD-10-CM

## 2024-05-22 DIAGNOSIS — E11.8 TYPE 2 DIABETES MELLITUS WITH COMPLICATION, WITHOUT LONG-TERM CURRENT USE OF INSULIN (HCC): Primary | ICD-10-CM

## 2024-05-22 LAB
ALBUMIN SERPL-MCNC: 4.6 G/DL (ref 3.5–4.6)
ALP SERPL-CCNC: 116 U/L (ref 35–104)
ALT SERPL-CCNC: 38 U/L (ref 0–41)
ANION GAP SERPL CALCULATED.3IONS-SCNC: 17 MEQ/L (ref 9–15)
AST SERPL-CCNC: 20 U/L (ref 0–40)
BILIRUB SERPL-MCNC: 0.4 MG/DL (ref 0.2–0.7)
BUN SERPL-MCNC: 16 MG/DL (ref 6–20)
CALCIUM SERPL-MCNC: 9.3 MG/DL (ref 8.5–9.9)
CHLORIDE SERPL-SCNC: 103 MEQ/L (ref 95–107)
CHOLEST SERPL-MCNC: 93 MG/DL (ref 0–199)
CHP ED QC CHECK: NORMAL
CO2 SERPL-SCNC: 18 MEQ/L (ref 20–31)
CREAT SERPL-MCNC: 0.64 MG/DL (ref 0.7–1.2)
ESTIMATED AVERAGE GLUCOSE: 140 MG/DL
GLOBULIN SER CALC-MCNC: 2.8 G/DL (ref 2.3–3.5)
GLUCOSE BLD-MCNC: 188 MG/DL
GLUCOSE SERPL-MCNC: 182 MG/DL (ref 70–99)
HBA1C MFR BLD: 6.4 %
HBA1C MFR BLD: 6.5 % (ref 4–6)
HCT VFR BLD AUTO: 48.8 % (ref 42–52)
HDLC SERPL-MCNC: 36 MG/DL (ref 40–59)
HGB BLD-MCNC: 16.9 G/DL (ref 14–18)
LDLC SERPL CALC-MCNC: 41 MG/DL (ref 0–129)
MCH RBC QN AUTO: 30.4 PG (ref 27–31.3)
MCHC RBC AUTO-ENTMCNC: 34.6 % (ref 33–37)
MCV RBC AUTO: 87.8 FL (ref 79–92.2)
PLATELET # BLD AUTO: 153 K/UL (ref 130–400)
POTASSIUM SERPL-SCNC: 4.4 MEQ/L (ref 3.4–4.9)
PROT SERPL-MCNC: 7.4 G/DL (ref 6.3–8)
RBC # BLD AUTO: 5.56 M/UL (ref 4.7–6.1)
SODIUM SERPL-SCNC: 138 MEQ/L (ref 135–144)
TRIGL SERPL-MCNC: 80 MG/DL (ref 0–150)
WBC # BLD AUTO: 8.4 K/UL (ref 4.8–10.8)

## 2024-05-22 PROCEDURE — 2022F DILAT RTA XM EVC RTNOPTHY: CPT | Performed by: PHYSICIAN ASSISTANT

## 2024-05-22 PROCEDURE — 99214 OFFICE O/P EST MOD 30 MIN: CPT | Performed by: PHYSICIAN ASSISTANT

## 2024-05-22 PROCEDURE — G8417 CALC BMI ABV UP PARAM F/U: HCPCS | Performed by: PHYSICIAN ASSISTANT

## 2024-05-22 PROCEDURE — 82962 GLUCOSE BLOOD TEST: CPT | Performed by: PHYSICIAN ASSISTANT

## 2024-05-22 PROCEDURE — G8427 DOCREV CUR MEDS BY ELIG CLIN: HCPCS | Performed by: PHYSICIAN ASSISTANT

## 2024-05-22 PROCEDURE — 1036F TOBACCO NON-USER: CPT | Performed by: PHYSICIAN ASSISTANT

## 2024-05-22 PROCEDURE — 3017F COLORECTAL CA SCREEN DOC REV: CPT | Performed by: PHYSICIAN ASSISTANT

## 2024-05-22 PROCEDURE — 83036 HEMOGLOBIN GLYCOSYLATED A1C: CPT | Performed by: PHYSICIAN ASSISTANT

## 2024-05-22 PROCEDURE — 3044F HG A1C LEVEL LT 7.0%: CPT | Performed by: PHYSICIAN ASSISTANT

## 2024-05-22 RX ORDER — BLOOD-GLUCOSE SENSOR
1 EACH MISCELLANEOUS
Qty: 6 EACH | Refills: 11 | Status: CANCELLED | OUTPATIENT
Start: 2024-05-22

## 2024-05-22 RX ORDER — BLOOD-GLUCOSE SENSOR
EACH MISCELLANEOUS
Qty: 6 EACH | Refills: 11 | Status: SHIPPED | OUTPATIENT
Start: 2024-05-22

## 2024-05-22 RX ORDER — BLOOD-GLUCOSE SENSOR
1 EACH MISCELLANEOUS
Qty: 6 EACH | Refills: 11 | Status: SHIPPED | OUTPATIENT
Start: 2024-05-22 | End: 2024-05-22 | Stop reason: SDUPTHER

## 2024-05-22 ASSESSMENT — ENCOUNTER SYMPTOMS
VOMITING: 0
EYE REDNESS: 0
SINUS PRESSURE: 0
COUGH: 0
EYE PAIN: 0
RHINORRHEA: 0
ABDOMINAL PAIN: 0
DIARRHEA: 0
WHEEZING: 0
BLURRED VISION: 0
SHORTNESS OF BREATH: 0
NAUSEA: 0
SORE THROAT: 0

## 2024-05-22 NOTE — PROGRESS NOTES
Assessment:       Diagnosis Orders   1. Type 2 diabetes mellitus with complication, without long-term current use of insulin (HCC)  POCT Glucose    POCT glycosylated hemoglobin (Hb A1C)    Hemoglobin A1C     DIABETES FOOT EXAM    Comprehensive Metabolic Panel    CBC    Lipid Panel    Continuous Glucose Sensor (FREESTYLE AMANDA 3 SENSOR) MISC    Hemoglobin A1C    Comprehensive Metabolic Panel        No history of Pancreatitis  Pt is uncircumcised, want to use SGLT2    PLAN:     Jardiance 10 mg daily   Omeprazole 20 mg daily   Ozempic 2.0 weekly  Continue pravastatin 10 mg nightly   Repeat labs in 6 months  Follow up in 6 months     Orders Placed This Encounter   Procedures    Hemoglobin A1C     Standing Status:   Future     Standing Expiration Date:   2025    Comprehensive Metabolic Panel     Standing Status:   Future     Standing Expiration Date:   2025    CBC     Standing Status:   Future     Standing Expiration Date:   2025    Lipid Panel     Standing Status:   Future     Standing Expiration Date:   2025     Order Specific Question:   Is Patient Fasting?/# of Hours     Answer:   8    Hemoglobin A1C     Standing Status:   Future     Standing Expiration Date:   2025    Comprehensive Metabolic Panel     Standing Status:   Future     Standing Expiration Date:   2025    POCT Glucose    POCT glycosylated hemoglobin (Hb A1C)     DIABETES FOOT EXAM     Orders Placed This Encounter   Medications    Continuous Glucose Sensor (FREESTYLE AMANDA 3 SENSOR) MISC     Si Device by Does not apply route 4 times daily (before meals and nightly)     Dispense:  6 each     Refill:  11     Return in about 6 months (around 2024) for Diabetes.  Subjective:     Chief Complaint   Patient presents with    Diabetes        Vitals:    24 0840   BP: 127/84   Site: Right Upper Arm   Position: Sitting   Cuff Size: Large Adult   Pulse: 85   Weight: 107 kg (236 lb)   Height: 1.88 m (6' 2\")     Wt

## 2024-05-22 NOTE — PATIENT INSTRUCTIONS
can arrive, the sensor may be bent or come out from under the skin, the transmitter may not function.  If this should happen usual your regular glucometer and test your blood until you can place a new sensor and transmitter on your skin.

## 2024-05-22 NOTE — TELEPHONE ENCOUNTER
Zoe from [facility] called and stated that the directions for the [employer] AMANDA 3 SENSORS are wrong.    One sensor change every 10 days.    Please resend script

## 2024-05-24 ENCOUNTER — TELEPHONE (OUTPATIENT)
Dept: ENDOCRINOLOGY | Age: 54
End: 2024-05-24

## 2024-05-24 NOTE — TELEPHONE ENCOUNTER
Patient stopped in office and sates that pharmacy advised that  PA is needed for his Freestyle Rick.

## 2024-06-03 ENCOUNTER — OFFICE VISIT (OUTPATIENT)
Dept: UROLOGY | Age: 54
End: 2024-06-03
Payer: COMMERCIAL

## 2024-06-03 VITALS
BODY MASS INDEX: 30.16 KG/M2 | HEIGHT: 74 IN | SYSTOLIC BLOOD PRESSURE: 100 MMHG | DIASTOLIC BLOOD PRESSURE: 64 MMHG | HEART RATE: 86 BPM | OXYGEN SATURATION: 98 % | WEIGHT: 235 LBS

## 2024-06-03 DIAGNOSIS — R35.0 URINARY FREQUENCY: Primary | ICD-10-CM

## 2024-06-03 DIAGNOSIS — N40.0 BENIGN PROSTATIC HYPERPLASIA WITHOUT LOWER URINARY TRACT SYMPTOMS: ICD-10-CM

## 2024-06-03 DIAGNOSIS — N40.1 BENIGN PROSTATIC HYPERPLASIA WITH URINARY FREQUENCY: ICD-10-CM

## 2024-06-03 DIAGNOSIS — R35.0 BENIGN PROSTATIC HYPERPLASIA WITH URINARY FREQUENCY: ICD-10-CM

## 2024-06-03 DIAGNOSIS — N40.0 BENIGN PROSTATIC HYPERPLASIA WITHOUT LOWER URINARY TRACT SYMPTOMS: Primary | ICD-10-CM

## 2024-06-03 DIAGNOSIS — Z12.5 SCREENING PSA (PROSTATE SPECIFIC ANTIGEN): ICD-10-CM

## 2024-06-03 LAB
BILIRUBIN, POC: ABNORMAL
BLOOD URINE, POC: ABNORMAL
CLARITY, POC: CLEAR
COLOR, POC: YELLOW
GLUCOSE URINE, POC: >=1000
KETONES, POC: ABNORMAL
LEUKOCYTE EST, POC: ABNORMAL
NITRITE, POC: ABNORMAL
PH, POC: 5.5
PROTEIN, POC: ABNORMAL
PSA SERPL-MCNC: 2.3 NG/ML (ref 0–4)
SPECIFIC GRAVITY, POC: 1.02
UROBILINOGEN, POC: 0.2

## 2024-06-03 PROCEDURE — G8417 CALC BMI ABV UP PARAM F/U: HCPCS | Performed by: PHYSICIAN ASSISTANT

## 2024-06-03 PROCEDURE — 81003 URINALYSIS AUTO W/O SCOPE: CPT | Performed by: PHYSICIAN ASSISTANT

## 2024-06-03 PROCEDURE — G8427 DOCREV CUR MEDS BY ELIG CLIN: HCPCS | Performed by: PHYSICIAN ASSISTANT

## 2024-06-03 PROCEDURE — 1036F TOBACCO NON-USER: CPT | Performed by: PHYSICIAN ASSISTANT

## 2024-06-03 PROCEDURE — 99213 OFFICE O/P EST LOW 20 MIN: CPT | Performed by: PHYSICIAN ASSISTANT

## 2024-06-03 PROCEDURE — 3017F COLORECTAL CA SCREEN DOC REV: CPT | Performed by: PHYSICIAN ASSISTANT

## 2024-06-03 RX ORDER — TADALAFIL 20 MG/1
20 TABLET ORAL DAILY PRN
Qty: 90 TABLET | Refills: 3 | Status: SHIPPED | OUTPATIENT
Start: 2024-06-03

## 2024-06-03 RX ORDER — ALFUZOSIN HYDROCHLORIDE 10 MG/1
10 TABLET, EXTENDED RELEASE ORAL DAILY
Qty: 90 TABLET | Refills: 3 | Status: SHIPPED | OUTPATIENT
Start: 2024-06-03

## 2024-06-03 ASSESSMENT — ENCOUNTER SYMPTOMS: APNEA: 0

## 2024-06-03 NOTE — PROGRESS NOTES
Subjective:      Patient ID: Orville Ramsey is a 54 y.o. male    HPI  54 year old male who presents as an annual visit to monitor his PSA levels. His PSA level on 6/3/24 was 2.30, previously it was 0.74 on 4/28/23, and prior to this it was 0.88 on 4/21/23. He complains of occasional urinary frequency and urgency. He complains of nocturia x's 1 per night. Denies gross hematuria    Past Medical History:   Diagnosis Date    Anxiety     BPH without urinary obstruction     Bunion of great toe of right foot     Controlled diabetes mellitus type 2 with complications (HCC) 2015    Diabetic neuropathy associated with type 2 diabetes mellitus (HCC)     EMG Dr Davis    Diverticulitis     Erectile dysfunction     Dr Lundberg     Folic acid deficiency 2018    GERD (gastroesophageal reflux disease)     Low HDL (under 40)     Non-compliance with treatment 2015    Obesity (BMI 30-39.9)      Past Surgical History:   Procedure Laterality Date    ABDOMEN SURGERY  2009    for diverticulitis    ABDOMINAL HERNIA REPAIR  2010    COLON SURGERY  2008    COLONOSCOPY      COLONOSCOPY N/A 9/1/2020    COLORECTAL CANCER SCREENING, HIGH RISK performed by Sita [facility] MD at [facility]    ENDOSCOPY, COLON, DIAGNOSTIC      THUMB AMPUTATION Right     due to accident     Social History     Socioeconomic History    Marital status:      Spouse name: None    Number of children: 5    Years of education: None    Highest education level: None   Occupational History    Occupation: ,      Employer: [employer]   Tobacco Use    Smoking status: Never    Smokeless tobacco: Never   Vaping Use    Vaping Use: Never used   Substance and Sexual Activity    Alcohol use: Yes     Comment: rare/social    Drug use: No    Sexual activity: Yes     Partners: Female   Social History Narrative    Born in [facility], father from [place], one sister and one brother    , 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

## 2024-06-27 RX ORDER — EMPAGLIFLOZIN 10 MG/1
10 TABLET, FILM COATED ORAL DAILY
Qty: 30 TABLET | Refills: 3 | Status: SHIPPED | OUTPATIENT
Start: 2024-06-27

## 2024-06-28 ENCOUNTER — TELEPHONE (OUTPATIENT)
Dept: ENDOCRINOLOGY | Age: 54
End: 2024-06-28

## 2024-09-12 ENCOUNTER — OFFICE VISIT (OUTPATIENT)
Dept: UROLOGY | Age: 54
End: 2024-09-12
Payer: COMMERCIAL

## 2024-09-12 VITALS
HEIGHT: 74 IN | SYSTOLIC BLOOD PRESSURE: 116 MMHG | BODY MASS INDEX: 30.03 KG/M2 | DIASTOLIC BLOOD PRESSURE: 70 MMHG | WEIGHT: 234 LBS

## 2024-09-12 DIAGNOSIS — N40.1 BENIGN PROSTATIC HYPERPLASIA WITH URINARY OBSTRUCTION: Primary | ICD-10-CM

## 2024-09-12 DIAGNOSIS — N13.8 BENIGN PROSTATIC HYPERPLASIA WITH URINARY OBSTRUCTION: Primary | ICD-10-CM

## 2024-09-12 DIAGNOSIS — N52.9 ERECTILE DYSFUNCTION, UNSPECIFIED ERECTILE DYSFUNCTION TYPE: ICD-10-CM

## 2024-09-12 PROCEDURE — G8417 CALC BMI ABV UP PARAM F/U: HCPCS | Performed by: PHYSICIAN ASSISTANT

## 2024-09-12 PROCEDURE — G8427 DOCREV CUR MEDS BY ELIG CLIN: HCPCS | Performed by: PHYSICIAN ASSISTANT

## 2024-09-12 PROCEDURE — 1036F TOBACCO NON-USER: CPT | Performed by: PHYSICIAN ASSISTANT

## 2024-09-12 PROCEDURE — 3017F COLORECTAL CA SCREEN DOC REV: CPT | Performed by: PHYSICIAN ASSISTANT

## 2024-09-12 RX ORDER — PRAVASTATIN SODIUM 10 MG
TABLET ORAL
Qty: 90 TABLET | Refills: 1 | Status: SHIPPED | OUTPATIENT
Start: 2024-09-12

## 2024-09-12 RX ORDER — SILDENAFIL 100 MG/1
100 TABLET, FILM COATED ORAL PRN
Qty: 90 TABLET | Refills: 3 | Status: SHIPPED | OUTPATIENT
Start: 2024-09-12

## 2024-09-12 ASSESSMENT — ENCOUNTER SYMPTOMS: APNEA: 0

## 2024-10-20 DIAGNOSIS — E11.8 TYPE 2 DIABETES MELLITUS WITH COMPLICATION, WITHOUT LONG-TERM CURRENT USE OF INSULIN (HCC): ICD-10-CM

## 2024-10-23 RX ORDER — EMPAGLIFLOZIN 10 MG/1
10 TABLET, FILM COATED ORAL DAILY
Qty: 30 TABLET | Refills: 0 | Status: SHIPPED | OUTPATIENT
Start: 2024-10-23

## 2024-10-25 RX ORDER — FOLIC ACID 1 MG/1
TABLET ORAL
Qty: 90 TABLET | Refills: 0 | Status: SHIPPED | OUTPATIENT
Start: 2024-10-25

## 2024-11-20 RX ORDER — SEMAGLUTIDE 2.68 MG/ML
INJECTION, SOLUTION SUBCUTANEOUS
Qty: 3 ML | Refills: 5 | Status: SHIPPED | OUTPATIENT
Start: 2024-11-20

## 2024-11-20 RX ORDER — EMPAGLIFLOZIN 10 MG/1
10 TABLET, FILM COATED ORAL DAILY
Qty: 30 TABLET | Refills: 0 | Status: SHIPPED | OUTPATIENT
Start: 2024-11-20

## 2024-11-21 ENCOUNTER — OFFICE VISIT (OUTPATIENT)
Dept: ENDOCRINOLOGY | Age: 54
End: 2024-11-21
Payer: COMMERCIAL

## 2024-11-21 VITALS
DIASTOLIC BLOOD PRESSURE: 84 MMHG | HEART RATE: 82 BPM | WEIGHT: 243 LBS | BODY MASS INDEX: 31.18 KG/M2 | HEIGHT: 74 IN | OXYGEN SATURATION: 91 % | SYSTOLIC BLOOD PRESSURE: 136 MMHG

## 2024-11-21 DIAGNOSIS — E11.8 TYPE 2 DIABETES MELLITUS WITH COMPLICATION, WITHOUT LONG-TERM CURRENT USE OF INSULIN (HCC): Primary | ICD-10-CM

## 2024-11-21 LAB
CHP ED QC CHECK: NORMAL
GLUCOSE BLD-MCNC: 209 MG/DL
HBA1C MFR BLD: 6.9 %

## 2024-11-21 PROCEDURE — 83036 HEMOGLOBIN GLYCOSYLATED A1C: CPT | Performed by: PHYSICIAN ASSISTANT

## 2024-11-21 PROCEDURE — 99214 OFFICE O/P EST MOD 30 MIN: CPT | Performed by: PHYSICIAN ASSISTANT

## 2024-11-21 PROCEDURE — 95251 CONT GLUC MNTR ANALYSIS I&R: CPT | Performed by: PHYSICIAN ASSISTANT

## 2024-11-21 PROCEDURE — 3017F COLORECTAL CA SCREEN DOC REV: CPT | Performed by: PHYSICIAN ASSISTANT

## 2024-11-21 PROCEDURE — G8484 FLU IMMUNIZE NO ADMIN: HCPCS | Performed by: PHYSICIAN ASSISTANT

## 2024-11-21 PROCEDURE — 3044F HG A1C LEVEL LT 7.0%: CPT | Performed by: PHYSICIAN ASSISTANT

## 2024-11-21 PROCEDURE — G8417 CALC BMI ABV UP PARAM F/U: HCPCS | Performed by: PHYSICIAN ASSISTANT

## 2024-11-21 PROCEDURE — G8427 DOCREV CUR MEDS BY ELIG CLIN: HCPCS | Performed by: PHYSICIAN ASSISTANT

## 2024-11-21 PROCEDURE — 2022F DILAT RTA XM EVC RTNOPTHY: CPT | Performed by: PHYSICIAN ASSISTANT

## 2024-11-21 PROCEDURE — 82962 GLUCOSE BLOOD TEST: CPT | Performed by: PHYSICIAN ASSISTANT

## 2024-11-21 PROCEDURE — 1036F TOBACCO NON-USER: CPT | Performed by: PHYSICIAN ASSISTANT

## 2024-11-21 RX ORDER — ACYCLOVIR 800 MG/1
TABLET ORAL
Qty: 6 EACH | Refills: 11 | Status: SHIPPED | OUTPATIENT
Start: 2024-11-21

## 2024-11-21 ASSESSMENT — ENCOUNTER SYMPTOMS
COUGH: 0
WHEEZING: 0
SORE THROAT: 0
VOMITING: 0
ABDOMINAL PAIN: 0
DIARRHEA: 0
SINUS PRESSURE: 0
BLURRED VISION: 0
EYE PAIN: 0
EYE REDNESS: 0
NAUSEA: 0
RHINORRHEA: 0
SHORTNESS OF BREATH: 0

## 2024-11-21 NOTE — PATIENT INSTRUCTIONS
Jardiance 10 mg daily   Omeprazole 20 mg daily   Ozempic 2.0 weekly  Rick 3 being used   Continue pravastatin 10 mg nightly   Repeat labs in 3 months  Follow up in 3 months

## 2024-11-21 NOTE — PROGRESS NOTES
Psychiatric/Behavioral:  Negative for dysphoric mood.        Objective:   Physical Exam  Constitutional:       Appearance: Normal appearance. He is well-developed. He is not ill-appearing.   HENT:      Head: Normocephalic and atraumatic.      Nose: No congestion.   Eyes:      Conjunctiva/sclera: Conjunctivae normal.   Cardiovascular:      Rate and Rhythm: Normal rate and regular rhythm.      Heart sounds: Normal heart sounds.   Pulmonary:      Effort: Pulmonary effort is normal.      Breath sounds: Normal breath sounds.   Abdominal:      General: Bowel sounds are normal.      Palpations: Abdomen is soft.   Musculoskeletal:         General: Normal range of motion.      Cervical back: Normal range of motion and neck supple.   Skin:     General: Skin is warm and dry.   Neurological:      General: No focal deficit present.      Mental Status: He is alert and oriented to person, place, and time.   Psychiatric:         Mood and Affect: Mood normal.

## 2024-11-29 DIAGNOSIS — E11.8 TYPE 2 DIABETES MELLITUS WITH COMPLICATION, WITHOUT LONG-TERM CURRENT USE OF INSULIN (HCC): ICD-10-CM

## 2024-12-02 RX ORDER — LISINOPRIL 5 MG/1
5 TABLET ORAL DAILY
Qty: 90 TABLET | Refills: 1 | Status: SHIPPED | OUTPATIENT
Start: 2024-12-02

## 2024-12-14 DIAGNOSIS — E11.8 TYPE 2 DIABETES MELLITUS WITH COMPLICATION, WITHOUT LONG-TERM CURRENT USE OF INSULIN (HCC): ICD-10-CM

## 2024-12-16 RX ORDER — LISINOPRIL 5 MG/1
5 TABLET ORAL DAILY
Qty: 90 TABLET | Refills: 0 | OUTPATIENT
Start: 2024-12-16

## 2024-12-18 RX ORDER — EMPAGLIFLOZIN 10 MG/1
10 TABLET, FILM COATED ORAL DAILY
Qty: 30 TABLET | Refills: 3 | Status: SHIPPED | OUTPATIENT
Start: 2024-12-18

## 2025-01-16 DIAGNOSIS — E11.8 TYPE 2 DIABETES MELLITUS WITH COMPLICATION, WITHOUT LONG-TERM CURRENT USE OF INSULIN (HCC): ICD-10-CM

## 2025-01-27 RX ORDER — FOLIC ACID 1 MG/1
TABLET ORAL
Qty: 90 TABLET | Refills: 1 | Status: SHIPPED | OUTPATIENT
Start: 2025-01-27

## 2025-03-11 RX ORDER — PRAVASTATIN SODIUM 10 MG
10 TABLET ORAL EVERY MORNING
Qty: 90 TABLET | Refills: 1 | Status: SHIPPED | OUTPATIENT
Start: 2025-03-11

## 2025-04-15 RX ORDER — EMPAGLIFLOZIN 10 MG/1
10 TABLET, FILM COATED ORAL DAILY
Qty: 30 TABLET | Refills: 3 | Status: SHIPPED | OUTPATIENT
Start: 2025-04-15

## 2025-06-03 RX ORDER — SEMAGLUTIDE 2.68 MG/ML
INJECTION, SOLUTION SUBCUTANEOUS
Qty: 3 ML | Refills: 0 | Status: SHIPPED | OUTPATIENT
Start: 2025-06-03 | End: 2025-06-24

## 2025-06-24 RX ORDER — SEMAGLUTIDE 2.68 MG/ML
INJECTION, SOLUTION SUBCUTANEOUS
Qty: 3 ML | Refills: 2 | Status: SHIPPED | OUTPATIENT
Start: 2025-06-24

## 2025-07-13 DIAGNOSIS — E11.8 TYPE 2 DIABETES MELLITUS WITH COMPLICATION, WITHOUT LONG-TERM CURRENT USE OF INSULIN (HCC): ICD-10-CM

## 2025-07-14 RX ORDER — OMEPRAZOLE 20 MG/1
CAPSULE, DELAYED RELEASE ORAL DAILY
Qty: 90 CAPSULE | Refills: 0 | Status: SHIPPED | OUTPATIENT
Start: 2025-07-14

## 2025-08-04 DIAGNOSIS — E11.8 TYPE 2 DIABETES MELLITUS WITH COMPLICATION, WITHOUT LONG-TERM CURRENT USE OF INSULIN (HCC): ICD-10-CM

## 2025-08-04 LAB
ALBUMIN SERPL-MCNC: 4.6 G/DL (ref 3.5–4.6)
ALP SERPL-CCNC: 129 U/L (ref 35–104)
ALT SERPL-CCNC: 27 U/L (ref 0–41)
ANION GAP SERPL CALCULATED.3IONS-SCNC: 12 MEQ/L (ref 9–15)
AST SERPL-CCNC: 18 U/L (ref 0–40)
BILIRUB SERPL-MCNC: 0.5 MG/DL (ref 0.2–0.7)
BUN SERPL-MCNC: 20 MG/DL (ref 6–20)
CALCIUM SERPL-MCNC: 9.3 MG/DL (ref 8.5–9.9)
CHLORIDE SERPL-SCNC: 102 MEQ/L (ref 95–107)
CO2 SERPL-SCNC: 24 MEQ/L (ref 20–31)
CREAT SERPL-MCNC: 0.72 MG/DL (ref 0.7–1.2)
CREAT UR-MCNC: 71.5 MG/DL
GLOBULIN SER CALC-MCNC: 2.7 G/DL (ref 2.3–3.5)
GLUCOSE SERPL-MCNC: 225 MG/DL (ref 70–99)
MICROALBUMIN UR-MCNC: 1.6 MG/DL
MICROALBUMIN/CREAT UR-RTO: 22.4 MG/G (ref 0–30)
POTASSIUM SERPL-SCNC: 4 MEQ/L (ref 3.4–4.9)
PROT SERPL-MCNC: 7.3 G/DL (ref 6.3–8)
SODIUM SERPL-SCNC: 138 MEQ/L (ref 135–144)

## 2025-08-05 ENCOUNTER — OFFICE VISIT (OUTPATIENT)
Age: 55
End: 2025-08-05
Payer: COMMERCIAL

## 2025-08-05 VITALS
DIASTOLIC BLOOD PRESSURE: 81 MMHG | SYSTOLIC BLOOD PRESSURE: 124 MMHG | WEIGHT: 233 LBS | OXYGEN SATURATION: 96 % | BODY MASS INDEX: 29.9 KG/M2 | HEIGHT: 74 IN | HEART RATE: 76 BPM

## 2025-08-05 DIAGNOSIS — E11.8 TYPE 2 DIABETES MELLITUS WITH COMPLICATION, WITHOUT LONG-TERM CURRENT USE OF INSULIN (HCC): Primary | ICD-10-CM

## 2025-08-05 LAB
CHP ED QC CHECK: NORMAL
ESTIMATED AVERAGE GLUCOSE: 140 MG/DL
GLUCOSE BLD-MCNC: 184 MG/DL
HBA1C MFR BLD: 6.5 % (ref 4–6)

## 2025-08-05 PROCEDURE — 99214 OFFICE O/P EST MOD 30 MIN: CPT | Performed by: PHYSICIAN ASSISTANT

## 2025-08-05 PROCEDURE — 2022F DILAT RTA XM EVC RTNOPTHY: CPT | Performed by: PHYSICIAN ASSISTANT

## 2025-08-05 PROCEDURE — 3017F COLORECTAL CA SCREEN DOC REV: CPT | Performed by: PHYSICIAN ASSISTANT

## 2025-08-05 PROCEDURE — 3044F HG A1C LEVEL LT 7.0%: CPT | Performed by: PHYSICIAN ASSISTANT

## 2025-08-05 PROCEDURE — G8417 CALC BMI ABV UP PARAM F/U: HCPCS | Performed by: PHYSICIAN ASSISTANT

## 2025-08-05 PROCEDURE — 82962 GLUCOSE BLOOD TEST: CPT | Performed by: PHYSICIAN ASSISTANT

## 2025-08-05 PROCEDURE — G8427 DOCREV CUR MEDS BY ELIG CLIN: HCPCS | Performed by: PHYSICIAN ASSISTANT

## 2025-08-05 PROCEDURE — 1036F TOBACCO NON-USER: CPT | Performed by: PHYSICIAN ASSISTANT

## 2025-08-05 RX ORDER — FOLIC ACID 1 MG/1
TABLET ORAL
Qty: 90 TABLET | Refills: 1 | Status: SHIPPED | OUTPATIENT
Start: 2025-08-05

## 2025-08-05 RX ORDER — HYDROCHLOROTHIAZIDE 12.5 MG/1
CAPSULE ORAL
Qty: 6 EACH | Refills: 5 | Status: SHIPPED | OUTPATIENT
Start: 2025-08-05

## 2025-08-05 RX ORDER — LISINOPRIL 5 MG/1
5 TABLET ORAL DAILY
Qty: 90 TABLET | Refills: 1 | Status: SHIPPED | OUTPATIENT
Start: 2025-08-05

## 2025-08-05 RX ORDER — SEMAGLUTIDE 2.68 MG/ML
2 INJECTION, SOLUTION SUBCUTANEOUS
Qty: 3 ML | Refills: 2 | Status: SHIPPED | OUTPATIENT
Start: 2025-08-05

## 2025-08-05 RX ORDER — OMEPRAZOLE 20 MG/1
20 CAPSULE, DELAYED RELEASE ORAL DAILY
Qty: 90 CAPSULE | Refills: 0 | Status: SHIPPED | OUTPATIENT
Start: 2025-08-05

## 2025-08-05 RX ORDER — PRAVASTATIN SODIUM 10 MG
10 TABLET ORAL EVERY MORNING
Qty: 90 TABLET | Refills: 1 | Status: SHIPPED | OUTPATIENT
Start: 2025-08-05

## 2025-08-05 RX ORDER — HYDROCHLOROTHIAZIDE 12.5 MG/1
CAPSULE ORAL
Qty: 2 EACH | Refills: 11 | Status: CANCELLED | OUTPATIENT
Start: 2025-08-05

## 2025-08-05 ASSESSMENT — ENCOUNTER SYMPTOMS
SORE THROAT: 0
SHORTNESS OF BREATH: 0
NAUSEA: 0
VOMITING: 0
SINUS PRESSURE: 0
EYE PAIN: 0
DIARRHEA: 0
ABDOMINAL PAIN: 0
BLURRED VISION: 0
COUGH: 0
EYE REDNESS: 0
WHEEZING: 0
RHINORRHEA: 0

## (undated) DEVICE — BRUSH ENDO CLN L90.5IN SHTH DIA1.7MM BRIST DIA5-7MM 2-6MM

## (undated) DEVICE — ADAPTER FLSH PMP FLD MGMT GI IRRIG OFP 2 DISPOSABLE

## (undated) DEVICE — Device: Brand: ENDO SMARTCAP

## (undated) DEVICE — TUBING, SUCTION, 1/4" X 10', STRAIGHT: Brand: MEDLINE

## (undated) DEVICE — ENDO CARRY-ON PROCEDURE KIT: Brand: ENDO CARRY-ON PROCEDURE KIT

## (undated) DEVICE — 4-PORT MANIFOLD: Brand: NEPTUNE 2

## (undated) DEVICE — TUBE SET 96 MM 64 MM H2O PERISTALTIC STD AUX CHANNEL